# Patient Record
Sex: MALE | Race: BLACK OR AFRICAN AMERICAN | NOT HISPANIC OR LATINO | URBAN - METROPOLITAN AREA
[De-identification: names, ages, dates, MRNs, and addresses within clinical notes are randomized per-mention and may not be internally consistent; named-entity substitution may affect disease eponyms.]

---

## 2022-03-11 ENCOUNTER — INPATIENT (INPATIENT)
Facility: HOSPITAL | Age: 77
LOS: 20 days | Discharge: NURSING, REHAB OR LONG-TERM CARE | DRG: 239 | End: 2022-04-01
Attending: STUDENT IN AN ORGANIZED HEALTH CARE EDUCATION/TRAINING PROGRAM | Admitting: INTERNAL MEDICINE
Payer: OTHER GOVERNMENT

## 2022-03-11 VITALS
WEIGHT: 171.08 LBS | OXYGEN SATURATION: 95 % | RESPIRATION RATE: 25 BRPM | HEIGHT: 60.7 IN | SYSTOLIC BLOOD PRESSURE: 151 MMHG | DIASTOLIC BLOOD PRESSURE: 75 MMHG | TEMPERATURE: 98 F | HEART RATE: 94 BPM

## 2022-03-11 DIAGNOSIS — I21.4 NON-ST ELEVATION (NSTEMI) MYOCARDIAL INFARCTION: ICD-10-CM

## 2022-03-11 LAB
ALBUMIN SERPL ELPH-MCNC: 2.8 G/DL — LOW (ref 3.3–5)
ALP SERPL-CCNC: 112 U/L — SIGNIFICANT CHANGE UP (ref 40–120)
ALT FLD-CCNC: 46 U/L — HIGH (ref 10–45)
ANION GAP SERPL CALC-SCNC: 16 MMOL/L — SIGNIFICANT CHANGE UP (ref 5–17)
APTT BLD: 74 SEC — HIGH (ref 27.5–35.5)
AST SERPL-CCNC: 140 U/L — HIGH (ref 10–40)
BASOPHILS # BLD AUTO: 0.04 K/UL — SIGNIFICANT CHANGE UP (ref 0–0.2)
BASOPHILS NFR BLD AUTO: 0.2 % — SIGNIFICANT CHANGE UP (ref 0–2)
BILIRUB SERPL-MCNC: 0.3 MG/DL — SIGNIFICANT CHANGE UP (ref 0.2–1.2)
BLD GP AB SCN SERPL QL: NEGATIVE — SIGNIFICANT CHANGE UP
BUN SERPL-MCNC: 63 MG/DL — HIGH (ref 7–23)
CALCIUM SERPL-MCNC: 8.5 MG/DL — SIGNIFICANT CHANGE UP (ref 8.4–10.5)
CHLORIDE SERPL-SCNC: 113 MMOL/L — HIGH (ref 96–108)
CO2 SERPL-SCNC: 13 MMOL/L — LOW (ref 22–31)
CREAT SERPL-MCNC: 3.92 MG/DL — HIGH (ref 0.5–1.3)
EGFR: 15 ML/MIN/1.73M2 — LOW
EOSINOPHIL # BLD AUTO: 0.22 K/UL — SIGNIFICANT CHANGE UP (ref 0–0.5)
EOSINOPHIL NFR BLD AUTO: 1.3 % — SIGNIFICANT CHANGE UP (ref 0–6)
GAS PNL BLDA: SIGNIFICANT CHANGE UP
GLUCOSE BLDC GLUCOMTR-MCNC: 136 MG/DL — HIGH (ref 70–99)
GLUCOSE SERPL-MCNC: 111 MG/DL — HIGH (ref 70–99)
HCT VFR BLD CALC: 29.7 % — LOW (ref 39–50)
HGB BLD-MCNC: 10 G/DL — LOW (ref 13–17)
IMM GRANULOCYTES NFR BLD AUTO: 1.3 % — SIGNIFICANT CHANGE UP (ref 0–1.5)
INR BLD: 1.4 RATIO — HIGH (ref 0.88–1.16)
LYMPHOCYTES # BLD AUTO: 0.75 K/UL — LOW (ref 1–3.3)
LYMPHOCYTES # BLD AUTO: 4.6 % — LOW (ref 13–44)
MAGNESIUM SERPL-MCNC: 2.1 MG/DL — SIGNIFICANT CHANGE UP (ref 1.6–2.6)
MCHC RBC-ENTMCNC: 25.6 PG — LOW (ref 27–34)
MCHC RBC-ENTMCNC: 33.7 GM/DL — SIGNIFICANT CHANGE UP (ref 32–36)
MCV RBC AUTO: 76 FL — LOW (ref 80–100)
MONOCYTES # BLD AUTO: 0.95 K/UL — HIGH (ref 0–0.9)
MONOCYTES NFR BLD AUTO: 5.8 % — SIGNIFICANT CHANGE UP (ref 2–14)
NEUTROPHILS # BLD AUTO: 14.2 K/UL — HIGH (ref 1.8–7.4)
NEUTROPHILS NFR BLD AUTO: 86.8 % — HIGH (ref 43–77)
NRBC # BLD: 0 /100 WBCS — SIGNIFICANT CHANGE UP (ref 0–0)
NT-PROBNP SERPL-SCNC: 5764 PG/ML — HIGH (ref 0–300)
PHOSPHATE SERPL-MCNC: 5.8 MG/DL — HIGH (ref 2.5–4.5)
PLATELET # BLD AUTO: 270 K/UL — SIGNIFICANT CHANGE UP (ref 150–400)
POTASSIUM SERPL-MCNC: 4.5 MMOL/L — SIGNIFICANT CHANGE UP (ref 3.5–5.3)
POTASSIUM SERPL-SCNC: 4.5 MMOL/L — SIGNIFICANT CHANGE UP (ref 3.5–5.3)
PROT SERPL-MCNC: 6.6 G/DL — SIGNIFICANT CHANGE UP (ref 6–8.3)
PROTHROM AB SERPL-ACNC: 16.3 SEC — HIGH (ref 10.5–13.4)
RBC # BLD: 3.91 M/UL — LOW (ref 4.2–5.8)
RBC # FLD: 15.6 % — HIGH (ref 10.3–14.5)
RH IG SCN BLD-IMP: POSITIVE — SIGNIFICANT CHANGE UP
SODIUM SERPL-SCNC: 142 MMOL/L — SIGNIFICANT CHANGE UP (ref 135–145)
WBC # BLD: 16.38 K/UL — HIGH (ref 3.8–10.5)
WBC # FLD AUTO: 16.38 K/UL — HIGH (ref 3.8–10.5)

## 2022-03-11 PROCEDURE — 93010 ELECTROCARDIOGRAM REPORT: CPT

## 2022-03-11 PROCEDURE — 71045 X-RAY EXAM CHEST 1 VIEW: CPT | Mod: 26

## 2022-03-11 PROCEDURE — 99291 CRITICAL CARE FIRST HOUR: CPT

## 2022-03-11 PROCEDURE — 99292 CRITICAL CARE ADDL 30 MIN: CPT | Mod: 25

## 2022-03-11 RX ORDER — CHLORHEXIDINE GLUCONATE 213 G/1000ML
1 SOLUTION TOPICAL
Refills: 0 | Status: DISCONTINUED | OUTPATIENT
Start: 2022-03-11 | End: 2022-04-01

## 2022-03-11 RX ORDER — DEXTROSE 50 % IN WATER 50 %
15 SYRINGE (ML) INTRAVENOUS ONCE
Refills: 0 | Status: DISCONTINUED | OUTPATIENT
Start: 2022-03-11 | End: 2022-03-12

## 2022-03-11 RX ORDER — IPRATROPIUM/ALBUTEROL SULFATE 18-103MCG
3 AEROSOL WITH ADAPTER (GRAM) INHALATION EVERY 6 HOURS
Refills: 0 | Status: DISCONTINUED | OUTPATIENT
Start: 2022-03-11 | End: 2022-03-12

## 2022-03-11 RX ORDER — GLUCAGON INJECTION, SOLUTION 0.5 MG/.1ML
1 INJECTION, SOLUTION SUBCUTANEOUS ONCE
Refills: 0 | Status: DISCONTINUED | OUTPATIENT
Start: 2022-03-11 | End: 2022-03-12

## 2022-03-11 RX ORDER — BUDESONIDE AND FORMOTEROL FUMARATE DIHYDRATE 160; 4.5 UG/1; UG/1
2 AEROSOL RESPIRATORY (INHALATION)
Refills: 0 | Status: DISCONTINUED | OUTPATIENT
Start: 2022-03-11 | End: 2022-04-01

## 2022-03-11 RX ORDER — CARVEDILOL PHOSPHATE 80 MG/1
6.25 CAPSULE, EXTENDED RELEASE ORAL EVERY 12 HOURS
Refills: 0 | Status: DISCONTINUED | OUTPATIENT
Start: 2022-03-11 | End: 2022-03-11

## 2022-03-11 RX ORDER — SENNA PLUS 8.6 MG/1
2 TABLET ORAL AT BEDTIME
Refills: 0 | Status: DISCONTINUED | OUTPATIENT
Start: 2022-03-11 | End: 2022-03-16

## 2022-03-11 RX ORDER — DEXTROSE 50 % IN WATER 50 %
25 SYRINGE (ML) INTRAVENOUS ONCE
Refills: 0 | Status: DISCONTINUED | OUTPATIENT
Start: 2022-03-11 | End: 2022-03-12

## 2022-03-11 RX ORDER — CARVEDILOL PHOSPHATE 80 MG/1
6.25 CAPSULE, EXTENDED RELEASE ORAL ONCE
Refills: 0 | Status: COMPLETED | OUTPATIENT
Start: 2022-03-11 | End: 2022-03-11

## 2022-03-11 RX ORDER — ATORVASTATIN CALCIUM 80 MG/1
20 TABLET, FILM COATED ORAL AT BEDTIME
Refills: 0 | Status: DISCONTINUED | OUTPATIENT
Start: 2022-03-11 | End: 2022-03-12

## 2022-03-11 RX ORDER — DOXAZOSIN MESYLATE 4 MG
4 TABLET ORAL AT BEDTIME
Refills: 0 | Status: DISCONTINUED | OUTPATIENT
Start: 2022-03-11 | End: 2022-04-01

## 2022-03-11 RX ORDER — DEXTROSE 50 % IN WATER 50 %
12.5 SYRINGE (ML) INTRAVENOUS ONCE
Refills: 0 | Status: DISCONTINUED | OUTPATIENT
Start: 2022-03-11 | End: 2022-03-12

## 2022-03-11 RX ORDER — SODIUM CHLORIDE 9 MG/ML
1000 INJECTION, SOLUTION INTRAVENOUS
Refills: 0 | Status: DISCONTINUED | OUTPATIENT
Start: 2022-03-11 | End: 2022-03-12

## 2022-03-11 RX ORDER — DIPHENHYDRAMINE HCL 50 MG
25 CAPSULE ORAL ONCE
Refills: 0 | Status: DISCONTINUED | OUTPATIENT
Start: 2022-03-11 | End: 2022-03-11

## 2022-03-11 RX ORDER — POLYETHYLENE GLYCOL 3350 17 G/17G
17 POWDER, FOR SOLUTION ORAL DAILY
Refills: 0 | Status: DISCONTINUED | OUTPATIENT
Start: 2022-03-11 | End: 2022-03-16

## 2022-03-11 RX ORDER — HYDRALAZINE HCL 50 MG
10 TABLET ORAL ONCE
Refills: 0 | Status: COMPLETED | OUTPATIENT
Start: 2022-03-11 | End: 2022-03-11

## 2022-03-11 RX ORDER — HEPARIN SODIUM 5000 [USP'U]/ML
1200 INJECTION INTRAVENOUS; SUBCUTANEOUS
Qty: 25000 | Refills: 0 | Status: DISCONTINUED | OUTPATIENT
Start: 2022-03-11 | End: 2022-03-13

## 2022-03-11 RX ORDER — PANTOPRAZOLE SODIUM 20 MG/1
40 TABLET, DELAYED RELEASE ORAL
Refills: 0 | Status: DISCONTINUED | OUTPATIENT
Start: 2022-03-11 | End: 2022-03-29

## 2022-03-11 RX ORDER — CARVEDILOL PHOSPHATE 80 MG/1
12.5 CAPSULE, EXTENDED RELEASE ORAL EVERY 12 HOURS
Refills: 0 | Status: DISCONTINUED | OUTPATIENT
Start: 2022-03-11 | End: 2022-04-01

## 2022-03-11 RX ORDER — CLOPIDOGREL BISULFATE 75 MG/1
75 TABLET, FILM COATED ORAL DAILY
Refills: 0 | Status: DISCONTINUED | OUTPATIENT
Start: 2022-03-11 | End: 2022-04-01

## 2022-03-11 RX ORDER — CEFTRIAXONE 500 MG/1
1000 INJECTION, POWDER, FOR SOLUTION INTRAMUSCULAR; INTRAVENOUS EVERY 24 HOURS
Refills: 0 | Status: DISCONTINUED | OUTPATIENT
Start: 2022-03-11 | End: 2022-03-11

## 2022-03-11 RX ORDER — INSULIN LISPRO 100/ML
VIAL (ML) SUBCUTANEOUS
Refills: 0 | Status: DISCONTINUED | OUTPATIENT
Start: 2022-03-11 | End: 2022-04-01

## 2022-03-11 RX ADMIN — CHLORHEXIDINE GLUCONATE 1 APPLICATION(S): 213 SOLUTION TOPICAL at 19:58

## 2022-03-11 RX ADMIN — Medication 4 MILLIGRAM(S): at 21:40

## 2022-03-11 RX ADMIN — CARVEDILOL PHOSPHATE 6.25 MILLIGRAM(S): 80 CAPSULE, EXTENDED RELEASE ORAL at 19:57

## 2022-03-11 RX ADMIN — SENNA PLUS 2 TABLET(S): 8.6 TABLET ORAL at 21:40

## 2022-03-11 RX ADMIN — Medication 10 MILLIGRAM(S): at 22:50

## 2022-03-11 RX ADMIN — ATORVASTATIN CALCIUM 20 MILLIGRAM(S): 80 TABLET, FILM COATED ORAL at 21:40

## 2022-03-11 RX ADMIN — CARVEDILOL PHOSPHATE 6.25 MILLIGRAM(S): 80 CAPSULE, EXTENDED RELEASE ORAL at 18:21

## 2022-03-11 RX ADMIN — BUDESONIDE AND FORMOTEROL FUMARATE DIHYDRATE 2 PUFF(S): 160; 4.5 AEROSOL RESPIRATORY (INHALATION) at 21:05

## 2022-03-11 NOTE — H&P ADULT - NSICDXPASTMEDICALHX_GEN_ALL_CORE_FT
PAST MEDICAL HISTORY:  HTN (hypertension)     Hyperlipidemia     PVD (peripheral vascular disease)     S/P BKA (below knee amputation), right

## 2022-03-11 NOTE — PATIENT PROFILE ADULT - FALL HARM RISK - HARM RISK INTERVENTIONS

## 2022-03-11 NOTE — H&P ADULT - NSHPREVIEWOFSYSTEMS_GEN_ALL_CORE
Review of Systems Review of Systems  General:	  Skin/Breast:	  Ophthalmologic:	  ENMT:	  Respiratory and Thorax:	complains of cough w phlegm  Cardiovascular:	  Gastrointestinal:	  Genitourinary:	  Musculoskeletal:	complains of pain on palpation of LLE  Neurological:	  Psychiatric:	  Hematology/Lymphatics:	  Endocrine:	  Allergic/Immunologic:

## 2022-03-11 NOTE — H&P ADULT - NSHPPHYSICALEXAM_GEN_ALL_CORE
PHYSICAL EXAM:  Constitutional: WAM in NAD  Eyes: glasses  Respiratory: Coarse breath sounds B/L w crackles on RL bases  Cardiovascular: S1, S2 no edema. NO JVD  Gastrointestinal: distended soft, NT  Genitourinary: urena in place  Extremities: R AKA, LLE cool, NO DP/PT or popliteal on doppler  Neurological: A and O x 4  Psychiatric: normal affect

## 2022-03-11 NOTE — H&P ADULT - ATTENDING COMMENTS
Patient seen and examined. Agree with assessment and plan as outlined above.  75 yo M w/ pmh of HTN, HLD, PVD, R AKA who was brought by EMS to [facility] after a mechanical fall from his wheelchair found to have CHF exacerbation as well as acute LLE arterial occlusions w/ unsuccessful angiogram. Patient was evaluated for LLE BKA and had abnormal nuclear stress test. Patient transferred for cardiac catheterization and evaluation prior to planned BKA. Continue heparin infusion, Vascular surgery re-evaluation. Blood pressure control. Check repeat chest x-ray and plan for gentle IV diuresis in addition to nebulizers for copd.

## 2022-03-11 NOTE — H&P ADULT - HISTORY OF PRESENT ILLNESS
75 yo M w/ pmh of HTN, HLD, PVD, R AKA who was brought by EMS to [facility]  after a mechanical fall from his wheelchair at home. NO LOC or head injury and he denies dizziness syncope. [facility] [place] Per his daughter he has been more short of breath when talking. He was admitted to [facility] for sepsis due to PNA w/ troponemia 2/2 to demand on 3/6/2022. He was also found to be in CHF as well as a LE arterial embolism    75 yo M w/ pmh of HTN, HLD, PVD, R AKA who was brought by EMS to [facility]  after a mechanical fall from his wheelchair at home. NO LOC or head injury and he denies dizziness syncope. [facility] [place] Per his daughter he has been more short of breath when talking. He was admitted to [facility] for sepsis due to PNA w/ troponemia 2/2 to demand on 3/6/2022. He was also found to be in CHF as well as a LE arterial embolism. Pt received Azithromycin and Ceftriaxone for a B/L PNA seen on XR. Hypoxic resp failure improved by day 2 of ICU stay. On 3/8 pt begin to complain of severe LLE pain and found to have LLE CFA and prox SFA occlusions.     75 yo M w/ pmh of HTN, HLD, PVD, R AKA who was brought by EMS to [facility]  after a mechanical fall from his wheelchair at home. NO LOC or head injury and he denies dizziness syncope. He was admitted to [facility] for sepsis due to PNA w/ troponemia 2/2 to demand on 3/6/2022. He was also found to be in CHF as well as a LE arterial embolism. Pt received Azithromycin and Ceftriaxone for a B/L PNA seen on CT Chest. Hypoxic resp failure improved by day 2 of ICU stay. On 3/8 pt begin to complain of severe LLE pain and found to have LLE CFA and prox SFA occlusions.  Pt was started on a heparin gtt and an arterial angiogram was attempted by Vascular surgery however it was unsuccessful. During the aortogram he became hypertensive with RAFIQ and decision was made to abort the case. BKA was recommended. Pt was noted to have elevated CKs and managed for rhabdo. Pre-op stress testing for BKA revealed multiple ischemic regions. Pt was transferred to [facility] for cardiac cath.    77 yo M w/ pmh of HTN, HLD, PVD, R AKA who was brought by EMS to [facility]  after a mechanical fall from his wheelchair at home. NO LOC or head injury and he denies dizziness syncope. He was admitted to [facility] for sepsis due to PNA w/ troponemia 2/2 to demand on 3/6/2022. He was also found to be in CHF as well as a LE arterial embolism. Pt received Azithromycin and Ceftriaxone for a B/L PNA seen on CT Chest. Hypoxic resp failure improved by day 2 of ICU stay. On 3/8 pt begin to complain of severe LLE pain and found to have LLE CFA and prox SFA occlusions.  Pt was started on a heparin gtt and an arterial angiogram was attempted by Vascular surgery however it was unsuccessful. During the aortogram he became hypertensive with RAFIQ and decision was made to abort the case. BKA was recommended. Pt was noted to have elevated CKs and managed for rhabdo. Pre-op stress testing for BKA revealed multiple ischemic regions. Pt was transferred to [facility] for cardiac cath.     ECHO revealed EF 35-40% , grade 2 DD.

## 2022-03-11 NOTE — H&P ADULT - ASSESSMENT
77 yo M w/ pmh of HTN, HLD, PVD, R AKA who was brought by EMS to [facility]  after a mechanical fall from his wheelchair found to have CHF exacerbation as well as acute LLE arterial occlusions w/ unsuccessful angiogram and e/o coronary artery disease on stress test now tx for PCI and cardiac clearance for possible LLE BKA    Neuro  - A and O x 4  - Dilaudid and oxy for pain . Denies pain at this time only on palpation    Resp  - On 2 L NC, + cough  - Completed course of PNA  - Check Chest XR and POCUS for B lines    CV  + stress test  - Plan for [facility] likely sunday for clearance for L BKA  - Check cardiac enzymes   - He presented w elevated cardiac enzymes  75 yo M w/ pmh of HTN, HLD, PVD, R AKA who was brought by EMS to [facility]  after a mechanical fall from his wheelchair found to have CHF exacerbation as well as acute LLE arterial occlusions w/ unsuccessful angiogram and e/o coronary artery disease on stress test now tx for PCI and cardiac clearance for possible LLE BKA    Neuro  - A and O x 4  - Dilaudid and oxy for pain . Denies pain at this time only on palpation    Resp  - On 2 L NC, + cough  - Completed course of PNA  - Check Chest XR and POCUS for B lines    CV  + stress test  - Plan for [facility] likely sunday for clearance for L BKA  - Check cardiac enzymes   - He presented w elevated cardiac enzymes     AdHF  - EF 45%, will check Chest XR and POCUS but likely needs further diuresis  - Keep net neg  - Continue coreg for HTN    GI  - DASH CC diet  - PPI      - Olivarez in place check UA    ID  - Was tx for PNA check WBC and temps  - Monitor off antibiotics    Renal  - Check cr and electrolytes    Heme  - Continue heparin gtt for CFA and SFA occlusions    Endo  - ISS    Vasc  L CFA and SFA occlusions w unsuccessful angio. Recommending L BKA possobly AKA  Pt amenable to surgery  - Continue heparin gtt, vasc surgery made aware.     Sammie Stone

## 2022-03-11 NOTE — PROGRESS NOTE ADULT - SUBJECTIVE AND OBJECTIVE BOX
THI HASTINGS  MRN-[id number]  Patient is a 76y old  Male who presents with a chief complaint of Cardiac clearnace for ischemic limb (11 Mar 2022 16:57)    HPI:  75 yo M w/ pmh of HTN, HLD, PVD, R AKA who was brought by EMS to [facility]  after a mechanical fall from his wheelchair at home. NO LOC or head injury and he denies dizziness syncope. He was admitted to [facility] for sepsis due to PNA w/ troponemia 2/2 to demand on 3/6/2022. He was also found to be in CHF as well as a LE arterial embolism. Pt received Azithromycin and Ceftriaxone for a B/L PNA seen on CT Chest. Hypoxic resp failure improved by day 2 of ICU stay. On 3/8 pt begin to complain of severe LLE pain and found to have LLE CFA and prox SFA occlusions.  Pt was started on a heparin gtt and an arterial angiogram was attempted by Vascular surgery however it was unsuccessful. During the aortogram he became hypertensive with RAFIQ and decision was made to abort the case. BKA was recommended. Pt was noted to have elevated CKs and managed for rhabdo. Pre-op stress testing for BKA revealed multiple ischemic regions. Pt was transferred to [facility] for cardiac cath.     ECHO revealed EF 35-40% , grade 2 DD.  (11 Mar 2022 16:57)      Hospital Course:    24 HOUR EVENTS:    REVIEW OF SYSTEMS:    CONSTITUTIONAL: No weakness, fevers or chills  EYES/ENT: No visual changes;  No vertigo or throat pain   NECK: No pain or stiffness  RESPIRATORY: No cough, wheezing, hemoptysis; No shortness of breath  CARDIOVASCULAR: No chest pain or palpitations  GASTROINTESTINAL: No abdominal or epigastric pain. No nausea, vomiting, or hematemesis; No diarrhea or constipation. No melena or hematochezia.  GENITOURINARY: No dysuria, frequency or hematuria  NEUROLOGICAL: No numbness or weakness  SKIN: No itching, rashes      ICU Vital Signs Last 24 Hrs  T(C): 36.9 (11 Mar 2022 19:00), Max: 36.9 (11 Mar 2022 19:00)  T(F): 98.5 (11 Mar 2022 19:00), Max: 98.5 (11 Mar 2022 19:00)  HR: 87 (11 Mar 2022 20:00) (87 - 96)  BP: 147/70 (11 Mar 2022 20:00) (143/68 - 162/75)  BP(mean): 100 (11 Mar 2022 20:00) (95 - 108)  ABP: 187/62 (11 Mar 2022 20:00) (187/62 - 206/71)  ABP(mean): 93 (11 Mar 2022 20:00) (93 - 103)  RR: 29 (11 Mar 2022 20:00) (21 - 34)  SpO2: 97% (11 Mar 2022 20:00) (95% - 100%)      CVP(mm Hg): --  CO: --  CI: --  PA: --  PA(mean): --  PA(direct): --  PCWP: --  LA: --  RA: --  SVR: --  SVRI: --  PVR: --  PVRI: --  I&O's Summary    11 Mar 2022 07:01  -  11 Mar 2022 20:27  --------------------------------------------------------  IN: 276 mL / OUT: 350 mL / NET: -74 mL        CAPILLARY BLOOD GLUCOSE    CAPILLARY BLOOD GLUCOSE      POCT Blood Glucose.: 136 mg/dL (11 Mar 2022 18:19)      PHYSICAL EXAM:  GENERAL: No acute distress, well-developed  HEAD:  Atraumatic, Normocephalic  EYES: EOMI, PERRLA, conjunctiva and sclera clear  NECK: Supple, no lymphadenopathy, no JVD  CHEST/LUNG: CTAB; No wheezes, rales, or rhonchi  HEART: Regular rate and rhythm. Normal S1/S2. No murmurs, rubs, or gallops  ABDOMEN: Soft, non-tender, non-distended; normal bowel sounds, no organomegaly  EXTREMITIES:  2+ peripheral pulses b/l, No clubbing, cyanosis, or edema  NEUROLOGY: A&O x 3, no focal deficits  SKIN: No rashes or lesions    ============================I/O===========================   I&O's Detail    11 Mar 2022 07:01  -  11 Mar 2022 20:27  --------------------------------------------------------  IN:    Heparin: 36 mL    Oral Fluid: 240 mL  Total IN: 276 mL    OUT:    Indwelling Catheter - Urethral (mL): 350 mL  Total OUT: 350 mL    Total NET: -74 mL        ============================ LABS =========================                        10.0   16.38 )-----------( 270      ( 11 Mar 2022 17:53 )             29.7     03-11    142  |  113<H>  |  63<H>  ----------------------------<  111<H>  4.5   |  13<L>  |  3.92<H>    Ca    8.5      11 Mar 2022 17:53  Phos  5.8     03-11  Mg     2.1     03-11    TPro  6.6  /  Alb  2.8<L>  /  TBili  0.3  /  DBili  x   /  AST  140<H>  /  ALT  46<H>  /  AlkPhos  112  03-11                LIVER FUNCTIONS - ( 11 Mar 2022 17:53 )  Alb: 2.8 g/dL / Pro: 6.6 g/dL / ALK PHOS: 112 U/L / ALT: 46 U/L / AST: 140 U/L / GGT: x           PT/INR - ( 11 Mar 2022 17:53 )   PT: 16.3 sec;   INR: 1.40 ratio         PTT - ( 11 Mar 2022 17:53 )  PTT:74.0 sec  ABG - ( 11 Mar 2022 17:47 )  pH, Arterial: 7.32  pH, Blood: x     /  pCO2: 26    /  pO2: 90    / HCO3: 13    / Base Excess: -11.3 /  SaO2: 97.9              Blood Gas Arterial, Lactate: 1.0 mmol/L (03-11-22 @ 17:47)      ======================Micro/Rad/Cardio=================  Telemtry: Reviewed   EKG: Reviewed  CXR: Reviewed  Culture: Reviewed   Echo:   Cath:   ======================================================  PAST MEDICAL & SURGICAL HISTORY:  HTN (hypertension)    Hyperlipidemia    PVD (peripheral vascular disease)    S/P BKA (below knee amputation), right      ====================ASSESSMENT ==============                Plan:  ====================== NEUROLOGY=====================  - A and O x 4  - Dilaudid and oxy for pain . Denies pain at this time only on palpation    ==================== RESPIRATORY======================  - On 2 L NC, + cough  - Completed course of PNA  - Check Chest XR and POCUS for B lines    ====================CARDIOVASCULAR==================  + stress test  - Plan for [facility] likely sunday for clearance for L BKA  - Check cardiac enzymes   - He presented w elevated cardiac enzymes     AdHF  - EF 45%, will check Chest XR and POCUS but likely needs further diuresis  - Keep net neg  - Continue coreg for HTN    L CFA and SFA occlusions w unsuccessful angio. Recommending L BKA possobly AKA  Pt amenable to surgery  - Continue heparin gtt, vasc surgery made aware.     carvedilol 12.5 milliGRAM(s) Oral every 12 hours  doxazosin 4 milliGRAM(s) Oral at bedtime    ===================HEMATOLOGIC/ONC ===================  - Continue heparin gtt for CFA and SFA occlusions    heparin  Infusion 1200 Unit(s)/Hr (12 mL/Hr) IV Continuous <Continuous>    ===================== RENAL =========================  Continue monitoring urine output    ==================== GASTROINTESTINAL===================  - DASH CC diet  - Bowel regimen with Miralax and Senna     GI prophylaxis, pantoprazole    Tablet 40 milliGRAM(s) Oral before breakfast  polyethylene glycol 3350 17 Gram(s) Oral daily  senna 2 Tablet(s) Oral at bedtime    =======================    ENDOCRINE  =====================  Stress hyperglycemia, continue glucose control with admelog sliding scale     insulin lispro (ADMELOG) corrective regimen sliding scale   SubCutaneous three times a day before meals    ========================INFECTIOUS DISEASE================  - Was tx for PNA check WBC and temps  - Monitor off antibiotics    Patient requires continuous monitoring with bedside rhythm monitoring, pulse ox monitoring, and intermittent blood gas analysis. Care plan discussed with ICU care team. Patient remained critical and at risk for life threatening decompensation.  Patient seen, examined and plan discussed with CCU team during rounds.     I have personally provided ____ minutes of critical care time excluding time spent on separate procedures, in addition to initial critical care time provided by the CICU Attending, Dr. Dunbar.     By signing my name below, I, Karma Lopez, attest that this documentation has been prepared under the direction and in the presence of Aileen Lambert NP.  Electronically signed: Brinda Campos, 03-11-22 @ 20:27    I, Aileen Lambert, personally performed the services described in this documentation. all medical record entries made by the brinda were at my direction and in my presence. I have reviewed the chart and agree that the record reflects my personal performance and is accurate and complete  Electronically signed: Aileen Lambert NP.       THI HASTINGS  MRN-[id number]  Patient is a 76y old  Male who presents with a chief complaint of Cardiac clearnace for ischemic limb (11 Mar 2022 16:57)    HPI:  77 yo M w/ pmh of HTN, HLD, PVD, R AKA who was brought by EMS to [facility]  after a mechanical fall from his wheelchair at home. NO LOC or head injury and he denies dizziness syncope. He was admitted to [facility] for sepsis due to PNA w/ troponemia 2/2 to demand on 3/6/2022. He was also found to be in CHF as well as a LE arterial embolism. Pt received Azithromycin and Ceftriaxone for a B/L PNA seen on CT Chest. Hypoxic resp failure improved by day 2 of ICU stay. On 3/8 pt begin to complain of severe LLE pain and found to have LLE CFA and prox SFA occlusions.  Pt was started on a heparin gtt and an arterial angiogram was attempted by Vascular surgery however it was unsuccessful. During the aortogram he became hypertensive with RAFIQ and decision was made to abort the case. BKA was recommended. Pt was noted to have elevated CKs and managed for rhabdo. Pre-op stress testing for BKA revealed multiple ischemic regions. Pt was transferred to [facility] for cardiac cath.     ECHO revealed EF 35-40% , grade 2 DD.  (11 Mar 2022 16:57)      Hospital Course:    24 HOUR EVENTS:    REVIEW OF SYSTEMS:    CONSTITUTIONAL: No weakness, fevers or chills  EYES/ENT: No visual changes;  No vertigo or throat pain   NECK: No pain or stiffness  RESPIRATORY: No cough, wheezing, hemoptysis; No shortness of breath  CARDIOVASCULAR: No chest pain or palpitations  GASTROINTESTINAL: No abdominal or epigastric pain. No nausea, vomiting, or hematemesis; No diarrhea or constipation. No melena or hematochezia.  GENITOURINARY: No dysuria, frequency or hematuria  NEUROLOGICAL: No numbness or weakness  SKIN: No itching, rashes      ICU Vital Signs Last 24 Hrs  T(C): 36.9 (11 Mar 2022 19:00), Max: 36.9 (11 Mar 2022 19:00)  T(F): 98.5 (11 Mar 2022 19:00), Max: 98.5 (11 Mar 2022 19:00)  HR: 87 (11 Mar 2022 20:00) (87 - 96)  BP: 147/70 (11 Mar 2022 20:00) (143/68 - 162/75)  BP(mean): 100 (11 Mar 2022 20:00) (95 - 108)  ABP: 187/62 (11 Mar 2022 20:00) (187/62 - 206/71)  ABP(mean): 93 (11 Mar 2022 20:00) (93 - 103)  RR: 29 (11 Mar 2022 20:00) (21 - 34)  SpO2: 97% (11 Mar 2022 20:00) (95% - 100%)      CVP(mm Hg): --  CO: --  CI: --  PA: --  PA(mean): --  PA(direct): --  PCWP: --  LA: --  RA: --  SVR: --  SVRI: --  PVR: --  PVRI: --  I&O's Summary    11 Mar 2022 07:01  -  11 Mar 2022 20:27  --------------------------------------------------------  IN: 276 mL / OUT: 350 mL / NET: -74 mL        CAPILLARY BLOOD GLUCOSE    CAPILLARY BLOOD GLUCOSE      POCT Blood Glucose.: 136 mg/dL (11 Mar 2022 18:19)      PHYSICAL EXAM:  GENERAL: No acute distress, well-developed  HEAD:  Atraumatic, Normocephalic  EYES: EOMI, PERRLA, conjunctiva and sclera clear  NECK: Supple, no lymphadenopathy, no JVD  CHEST/LUNG: CTAB; No wheezes, rales, or rhonchi  HEART: Regular rate and rhythm. Normal S1/S2. No murmurs, rubs, or gallops  ABDOMEN: Soft, non-tender, non-distended; normal bowel sounds, no organomegaly  EXTREMITIES:  2+ peripheral pulses b/l, No clubbing, cyanosis, or edema  NEUROLOGY: A&O x 3, no focal deficits  SKIN: No rashes or lesions    ============================I/O===========================   I&O's Detail    11 Mar 2022 07:01  -  11 Mar 2022 20:27  --------------------------------------------------------  IN:    Heparin: 36 mL    Oral Fluid: 240 mL  Total IN: 276 mL    OUT:    Indwelling Catheter - Urethral (mL): 350 mL  Total OUT: 350 mL    Total NET: -74 mL        ============================ LABS =========================                        10.0   16.38 )-----------( 270      ( 11 Mar 2022 17:53 )             29.7     03-11    142  |  113<H>  |  63<H>  ----------------------------<  111<H>  4.5   |  13<L>  |  3.92<H>    Ca    8.5      11 Mar 2022 17:53  Phos  5.8     03-11  Mg     2.1     03-11    TPro  6.6  /  Alb  2.8<L>  /  TBili  0.3  /  DBili  x   /  AST  140<H>  /  ALT  46<H>  /  AlkPhos  112  03-11                LIVER FUNCTIONS - ( 11 Mar 2022 17:53 )  Alb: 2.8 g/dL / Pro: 6.6 g/dL / ALK PHOS: 112 U/L / ALT: 46 U/L / AST: 140 U/L / GGT: x           PT/INR - ( 11 Mar 2022 17:53 )   PT: 16.3 sec;   INR: 1.40 ratio         PTT - ( 11 Mar 2022 17:53 )  PTT:74.0 sec  ABG - ( 11 Mar 2022 17:47 )  pH, Arterial: 7.32  pH, Blood: x     /  pCO2: 26    /  pO2: 90    / HCO3: 13    / Base Excess: -11.3 /  SaO2: 97.9              Blood Gas Arterial, Lactate: 1.0 mmol/L (03-11-22 @ 17:47)      ======================Micro/Rad/Cardio=================  Telemtry: Reviewed   EKG: Reviewed  CXR: Reviewed  Culture: Reviewed   Echo:   Cath:   ======================================================  PAST MEDICAL & SURGICAL HISTORY:  HTN (hypertension)    Hyperlipidemia    PVD (peripheral vascular disease)    S/P BKA (below knee amputation), right      ====================ASSESSMENT ==============                Plan:  ====================== NEUROLOGY=====================  - A and O x 4  - Dilaudid and oxy for pain . Denies pain at this time only on palpation    ==================== RESPIRATORY======================  - On 2 L NC, + cough  - Completed course of PNA  - Check Chest XR and POCUS for B lines    ====================CARDIOVASCULAR==================  + stress test  - Plan for [facility] likely sunday for clearance for L BKA  - Check cardiac enzymes   - He presented w elevated cardiac enzymes     AdHF  - EF 45%, will check Chest XR and POCUS but likely needs further diuresis  - Keep net neg  - Continue coreg for HTN    L CFA and SFA occlusions w unsuccessful angio. Recommending L BKA possobly AKA  Pt amenable to surgery  - Continue heparin gtt, vasc surgery made aware.     carvedilol 12.5 milliGRAM(s) Oral every 12 hours  doxazosin 4 milliGRAM(s) Oral at bedtime    ===================HEMATOLOGIC/ONC ===================  - Continue heparin gtt for CFA and SFA occlusions    heparin  Infusion 1200 Unit(s)/Hr (12 mL/Hr) IV Continuous <Continuous>    ===================== RENAL =========================  Continue monitoring urine output    ==================== GASTROINTESTINAL===================  - DASH CC diet  - Bowel regimen with Miralax and Senna     GI prophylaxis, pantoprazole    Tablet 40 milliGRAM(s) Oral before breakfast  polyethylene glycol 3350 17 Gram(s) Oral daily  senna 2 Tablet(s) Oral at bedtime    =======================    ENDOCRINE  =====================  Stress hyperglycemia, continue glucose control with admelog sliding scale     insulin lispro (ADMELOG) corrective regimen sliding scale   SubCutaneous three times a day before meals    ========================INFECTIOUS DISEASE================  - Was tx for PNA check WBC and temps  - Monitor off antibiotics    Patient requires continuous monitoring with bedside rhythm monitoring, pulse ox monitoring, and intermittent blood gas analysis. Care plan discussed with ICU care team. Patient remained critical and at risk for life threatening decompensation.  Patient seen, examined and plan discussed with CCU team during rounds.     I have personally provided ____ minutes of critical care time excluding time spent on separate procedures, in addition to initial critical care time provided by the CICU Attending, Dr. Dunbar.     By signing my name below, I, Karma Lopez, attest that this documentation has been prepared under the direction and in the presence of Aileen Lambert NP.  Electronically signed: Brinda Campos, 03-11-22 @ 20:27    I, Aileen Lambert, personally performed the services described in this documentation. all medical record entries made by the brinda were at my direction and in my presence. I have reviewed the chart and agree that the record reflects my personal performance and is accurate and complete  Electronically signed: Aileen Lambert NP.    Jose Baker Fellow Attestation   Transferred from [facility] for cardiac clearnace given positive stress test and intermitent chest pain. Plan for LHC prior to his LLE limb surgery for severe PAD. Continue heparin gtt    THI HASTINGS  MRN-[id number]  Patient is a 76y old  Male who presents with a chief complaint of Cardiac clearnace for ischemic limb (11 Mar 2022 16:57)    HPI:  75 yo M w/ pmh of HTN, HLD, PVD, R AKA who was brought by EMS to [facility]  after a mechanical fall from his wheelchair at home. NO LOC or head injury and he denies dizziness syncope. He was admitted to [facility] for sepsis due to PNA w/ troponemia 2/2 to demand on 3/6/2022. He was also found to be in CHF as well as a LE arterial embolism. Pt received Azithromycin and Ceftriaxone for a B/L PNA seen on CT Chest. Hypoxic resp failure improved by day 2 of ICU stay. On 3/8 pt begin to complain of severe LLE pain and found to have LLE CFA and prox SFA occlusions.  Pt was started on a heparin gtt and an arterial angiogram was attempted by Vascular surgery however it was unsuccessful. During the aortogram he became hypertensive with RAFIQ and decision was made to abort the case. BKA was recommended. Pt was noted to have elevated CKs and managed for rhabdo. Pre-op stress testing for BKA revealed multiple ischemic regions. Pt was transferred to [facility] for cardiac cath.     ECHO revealed EF 35-40% , grade 2 DD.  (11 Mar 2022 16:57)      Hospital Course:    24 HOUR EVENTS:  Hypertensive overnight, coreg dose increase and hydralazine 10mg IVP given with appropriate response    REVIEW OF SYSTEMS:    CONSTITUTIONAL: No weakness, fevers or chills  EYES/ENT: No visual changes;  No vertigo or throat pain   NECK: No pain or stiffness  RESPIRATORY: No cough, wheezing, hemoptysis; No shortness of breath  CARDIOVASCULAR: No chest pain or palpitations  GASTROINTESTINAL: No abdominal or epigastric pain. No nausea, vomiting, or hematemesis; No diarrhea or constipation. No melena or hematochezia.  GENITOURINARY: No dysuria, frequency or hematuria  NEUROLOGICAL: No numbness or weakness  SKIN: No itching, rashes      ICU Vital Signs Last 24 Hrs  T(C): 36.9 (11 Mar 2022 19:00), Max: 36.9 (11 Mar 2022 19:00)  T(F): 98.5 (11 Mar 2022 19:00), Max: 98.5 (11 Mar 2022 19:00)  HR: 87 (11 Mar 2022 20:00) (87 - 96)  BP: 147/70 (11 Mar 2022 20:00) (143/68 - 162/75)  BP(mean): 100 (11 Mar 2022 20:00) (95 - 108)  ABP: 187/62 (11 Mar 2022 20:00) (187/62 - 206/71)  ABP(mean): 93 (11 Mar 2022 20:00) (93 - 103)  RR: 29 (11 Mar 2022 20:00) (21 - 34)  SpO2: 97% (11 Mar 2022 20:00) (95% - 100%)      CVP(mm Hg): --  CO: --  CI: --  PA: --  PA(mean): --  PA(direct): --  PCWP: --  LA: --  RA: --  SVR: --  SVRI: --  PVR: --  PVRI: --  I&O's Summary    11 Mar 2022 07:01  -  11 Mar 2022 20:27  --------------------------------------------------------  IN: 276 mL / OUT: 350 mL / NET: -74 mL        CAPILLARY BLOOD GLUCOSE    CAPILLARY BLOOD GLUCOSE      POCT Blood Glucose.: 136 mg/dL (11 Mar 2022 18:19)      PHYSICAL EXAM:  GENERAL: No acute distress, well-developed  HEAD:  Atraumatic, Normocephalic  EYES: EOMI, PERRLA, conjunctiva and sclera clear  NECK: Supple, no lymphadenopathy, no JVD  CHEST/LUNG: CTAB; No wheezes, rales, or rhonchi  HEART: Regular rate and rhythm. Normal S1/S2. No murmurs, rubs, or gallops  ABDOMEN: Soft, non-tender, non-distended; normal bowel sounds, no organomegaly  EXTREMITIES:  no pulses to left lower extremities. 2+ peripheral pulses, No clubbing, cyanosis, or edema  NEUROLOGY: A&O x 3, no focal deficits  SKIN: No rashes or lesions    ============================I/O===========================   I&O's Detail    11 Mar 2022 07:01  -  11 Mar 2022 20:27  --------------------------------------------------------  IN:    Heparin: 36 mL    Oral Fluid: 240 mL  Total IN: 276 mL    OUT:    Indwelling Catheter - Urethral (mL): 350 mL  Total OUT: 350 mL    Total NET: -74 mL        ============================ LABS =========================                        10.0   16.38 )-----------( 270      ( 11 Mar 2022 17:53 )             29.7     03-11    142  |  113<H>  |  63<H>  ----------------------------<  111<H>  4.5   |  13<L>  |  3.92<H>    Ca    8.5      11 Mar 2022 17:53  Phos  5.8     03-11  Mg     2.1     03-11    TPro  6.6  /  Alb  2.8<L>  /  TBili  0.3  /  DBili  x   /  AST  140<H>  /  ALT  46<H>  /  AlkPhos  112  03-11                LIVER FUNCTIONS - ( 11 Mar 2022 17:53 )  Alb: 2.8 g/dL / Pro: 6.6 g/dL / ALK PHOS: 112 U/L / ALT: 46 U/L / AST: 140 U/L / GGT: x           PT/INR - ( 11 Mar 2022 17:53 )   PT: 16.3 sec;   INR: 1.40 ratio         PTT - ( 11 Mar 2022 17:53 )  PTT:74.0 sec  ABG - ( 11 Mar 2022 17:47 )  pH, Arterial: 7.32  pH, Blood: x     /  pCO2: 26    /  pO2: 90    / HCO3: 13    / Base Excess: -11.3 /  SaO2: 97.9              Blood Gas Arterial, Lactate: 1.0 mmol/L (03-11-22 @ 17:47)      ======================Micro/Rad/Cardio=================  Telemtry: Reviewed   EKG: Reviewed  CXR: Reviewed  Culture: Reviewed   Echo:   Cath:   ======================================================  PAST MEDICAL & SURGICAL HISTORY:  HTN (hypertension)    Hyperlipidemia    PVD (peripheral vascular disease)    S/P BKA (below knee amputation), right      ====================ASSESSMENT ==============                Plan:  ====================== NEUROLOGY=====================  - A and O x 4  - Dilaudid and oxy for pain . Denies pain at this time only on palpation    ==================== RESPIRATORY======================  - On 2 L NC, + cough  - Completed course of PNA  - Check Chest XR and POCUS for B lines    ====================CARDIOVASCULAR==================  + stress test  - Plan for [facility] likely sunday for clearance for L BKA  - Check cardiac enzymes   - He presented w elevated cardiac enzymes     AdHF  - EF 45%, will check Chest XR and POCUS but likely needs further diuresis  - Keep net neg  - Continue coreg for HTN    L CFA and SFA occlusions w unsuccessful angio. Recommending L BKA possobly AKA  Pt amenable to surgery  - Continue heparin gtt, vasc surgery made aware.     carvedilol 12.5 milliGRAM(s) Oral every 12 hours  doxazosin 4 milliGRAM(s) Oral at bedtime    ===================HEMATOLOGIC/ONC ===================  - Continue heparin gtt for CFA and SFA occlusions    heparin  Infusion 1200 Unit(s)/Hr (12 mL/Hr) IV Continuous <Continuous>    ===================== RENAL =========================  Continue monitoring urine output    ==================== GASTROINTESTINAL===================  - DASH CC diet  - Bowel regimen with Miralax and Senna     GI prophylaxis, pantoprazole    Tablet 40 milliGRAM(s) Oral before breakfast  polyethylene glycol 3350 17 Gram(s) Oral daily  senna 2 Tablet(s) Oral at bedtime    =======================    ENDOCRINE  =====================  Stress hyperglycemia, continue glucose control with admelog sliding scale     insulin lispro (ADMELOG) corrective regimen sliding scale   SubCutaneous three times a day before meals    ========================INFECTIOUS DISEASE================  - Was tx for PNA check WBC and temps  - Monitor off antibiotics    Patient requires continuous monitoring with bedside rhythm monitoring, pulse ox monitoring, and intermittent blood gas analysis. Care plan discussed with ICU care team. Patient remained critical and at risk for life threatening decompensation.  Patient seen, examined and plan discussed with CCU team during rounds.     I have personally provided __30__ minutes of critical care time excluding time spent on separate procedures, in addition to initial critical care time provided by the CICU Attending, Dr. Dunbar.     By signing my name below, I, Karma Lopez, attest that this documentation has been prepared under the direction and in the presence of Aileen Lambert NP.  Electronically signed: Anthony Campos, 03-11-22 @ 20:27    I, Aileen Lambert, personally performed the services described in this documentation. all medical record entries made by the scribe were at my direction and in my presence. I have reviewed the chart and agree that the record reflects my personal performance and is accurate and complete  Electronically signed: Aileen Lambert NP.    Jose Baker Fellow Attestation   Transferred from [facility] for cardiac clearnace given positive stress test and intermitent chest pain. Plan for [facility] prior to his LLE limb surgery for severe PAD. Continue heparin gtt

## 2022-03-12 DIAGNOSIS — I77.9 DISORDER OF ARTERIES AND ARTERIOLES, UNSPECIFIED: ICD-10-CM

## 2022-03-12 DIAGNOSIS — J18.9 PNEUMONIA, UNSPECIFIED ORGANISM: ICD-10-CM

## 2022-03-12 DIAGNOSIS — N17.9 ACUTE KIDNEY FAILURE, UNSPECIFIED: ICD-10-CM

## 2022-03-12 DIAGNOSIS — I10 ESSENTIAL (PRIMARY) HYPERTENSION: ICD-10-CM

## 2022-03-12 DIAGNOSIS — R94.39 ABNORMAL RESULT OF OTHER CARDIOVASCULAR FUNCTION STUDY: ICD-10-CM

## 2022-03-12 DIAGNOSIS — I50.9 HEART FAILURE, UNSPECIFIED: ICD-10-CM

## 2022-03-12 DIAGNOSIS — E78.5 HYPERLIPIDEMIA, UNSPECIFIED: ICD-10-CM

## 2022-03-12 LAB
A1C WITH ESTIMATED AVERAGE GLUCOSE RESULT: 5.7 % — HIGH (ref 4–5.6)
ALBUMIN SERPL ELPH-MCNC: 2.6 G/DL — LOW (ref 3.3–5)
ALP SERPL-CCNC: 103 U/L — SIGNIFICANT CHANGE UP (ref 40–120)
ALT FLD-CCNC: 43 U/L — SIGNIFICANT CHANGE UP (ref 10–45)
ANION GAP SERPL CALC-SCNC: 15 MMOL/L — SIGNIFICANT CHANGE UP (ref 5–17)
APTT BLD: 58.7 SEC — HIGH (ref 27.5–35.5)
APTT BLD: 67.1 SEC — HIGH (ref 27.5–35.5)
APTT BLD: 73.8 SEC — HIGH (ref 27.5–35.5)
AST SERPL-CCNC: 142 U/L — HIGH (ref 10–40)
BASOPHILS # BLD AUTO: 0.02 K/UL — SIGNIFICANT CHANGE UP (ref 0–0.2)
BASOPHILS NFR BLD AUTO: 0.1 % — SIGNIFICANT CHANGE UP (ref 0–2)
BILIRUB SERPL-MCNC: 0.3 MG/DL — SIGNIFICANT CHANGE UP (ref 0.2–1.2)
BLD GP AB SCN SERPL QL: NEGATIVE — SIGNIFICANT CHANGE UP
BUN SERPL-MCNC: 65 MG/DL — HIGH (ref 7–23)
CALCIUM SERPL-MCNC: 8.6 MG/DL — SIGNIFICANT CHANGE UP (ref 8.4–10.5)
CHLORIDE SERPL-SCNC: 115 MMOL/L — HIGH (ref 96–108)
CHOLEST SERPL-MCNC: 106 MG/DL — SIGNIFICANT CHANGE UP
CO2 SERPL-SCNC: 12 MMOL/L — LOW (ref 22–31)
COVID-19 NUCLEOCAPSID GAM AB INTERP: POSITIVE
COVID-19 NUCLEOCAPSID TOTAL GAM ANTIBODY RESULT: 103 INDEX — HIGH
COVID-19 SPIKE DOMAIN AB INTERP: POSITIVE
COVID-19 SPIKE DOMAIN ANTIBODY RESULT: >250 U/ML — HIGH
CREAT SERPL-MCNC: 3.78 MG/DL — HIGH (ref 0.5–1.3)
EGFR: 16 ML/MIN/1.73M2 — LOW
EOSINOPHIL # BLD AUTO: 0.16 K/UL — SIGNIFICANT CHANGE UP (ref 0–0.5)
EOSINOPHIL NFR BLD AUTO: 1 % — SIGNIFICANT CHANGE UP (ref 0–6)
ESTIMATED AVERAGE GLUCOSE: 117 MG/DL — HIGH (ref 68–114)
GLUCOSE BLDC GLUCOMTR-MCNC: 109 MG/DL — HIGH (ref 70–99)
GLUCOSE BLDC GLUCOMTR-MCNC: 113 MG/DL — HIGH (ref 70–99)
GLUCOSE BLDC GLUCOMTR-MCNC: 120 MG/DL — HIGH (ref 70–99)
GLUCOSE BLDC GLUCOMTR-MCNC: 123 MG/DL — HIGH (ref 70–99)
GLUCOSE SERPL-MCNC: 111 MG/DL — HIGH (ref 70–99)
HCT VFR BLD CALC: 27.5 % — LOW (ref 39–50)
HCV AB S/CO SERPL IA: 0.16 S/CO — SIGNIFICANT CHANGE UP (ref 0–0.99)
HCV AB SERPL-IMP: SIGNIFICANT CHANGE UP
HDLC SERPL-MCNC: 33 MG/DL — LOW
HGB BLD-MCNC: 9.6 G/DL — LOW (ref 13–17)
IMM GRANULOCYTES NFR BLD AUTO: 1.6 % — HIGH (ref 0–1.5)
INR BLD: 1.37 RATIO — HIGH (ref 0.88–1.16)
LIPID PNL WITH DIRECT LDL SERPL: 54 MG/DL — SIGNIFICANT CHANGE UP
LYMPHOCYTES # BLD AUTO: 0.88 K/UL — LOW (ref 1–3.3)
LYMPHOCYTES # BLD AUTO: 5.4 % — LOW (ref 13–44)
MAGNESIUM SERPL-MCNC: 2 MG/DL — SIGNIFICANT CHANGE UP (ref 1.6–2.6)
MCHC RBC-ENTMCNC: 26.2 PG — LOW (ref 27–34)
MCHC RBC-ENTMCNC: 34.9 GM/DL — SIGNIFICANT CHANGE UP (ref 32–36)
MCV RBC AUTO: 75.1 FL — LOW (ref 80–100)
MONOCYTES # BLD AUTO: 1.06 K/UL — HIGH (ref 0–0.9)
MONOCYTES NFR BLD AUTO: 6.4 % — SIGNIFICANT CHANGE UP (ref 2–14)
NEUTROPHILS # BLD AUTO: 14.05 K/UL — HIGH (ref 1.8–7.4)
NEUTROPHILS NFR BLD AUTO: 85.5 % — HIGH (ref 43–77)
NON HDL CHOLESTEROL: 73 MG/DL — SIGNIFICANT CHANGE UP
NRBC # BLD: 0 /100 WBCS — SIGNIFICANT CHANGE UP (ref 0–0)
PHOSPHATE SERPL-MCNC: 5.7 MG/DL — HIGH (ref 2.5–4.5)
PLATELET # BLD AUTO: 254 K/UL — SIGNIFICANT CHANGE UP (ref 150–400)
POTASSIUM SERPL-MCNC: 4 MMOL/L — SIGNIFICANT CHANGE UP (ref 3.5–5.3)
POTASSIUM SERPL-SCNC: 4 MMOL/L — SIGNIFICANT CHANGE UP (ref 3.5–5.3)
PROT SERPL-MCNC: 6.2 G/DL — SIGNIFICANT CHANGE UP (ref 6–8.3)
PROTHROM AB SERPL-ACNC: 16 SEC — HIGH (ref 10.5–13.4)
RBC # BLD: 3.66 M/UL — LOW (ref 4.2–5.8)
RBC # FLD: 15.3 % — HIGH (ref 10.3–14.5)
RH IG SCN BLD-IMP: POSITIVE — SIGNIFICANT CHANGE UP
SARS-COV-2 IGG+IGM SERPL QL IA: 103 INDEX — HIGH
SARS-COV-2 IGG+IGM SERPL QL IA: >250 U/ML — HIGH
SARS-COV-2 IGG+IGM SERPL QL IA: POSITIVE
SARS-COV-2 IGG+IGM SERPL QL IA: POSITIVE
SARS-COV-2 RNA SPEC QL NAA+PROBE: SIGNIFICANT CHANGE UP
SODIUM SERPL-SCNC: 142 MMOL/L — SIGNIFICANT CHANGE UP (ref 135–145)
TRIGL SERPL-MCNC: 95 MG/DL — SIGNIFICANT CHANGE UP
TSH SERPL-MCNC: 1.6 UIU/ML — SIGNIFICANT CHANGE UP (ref 0.27–4.2)
WBC # BLD: 16.44 K/UL — HIGH (ref 3.8–10.5)
WBC # FLD AUTO: 16.44 K/UL — HIGH (ref 3.8–10.5)

## 2022-03-12 PROCEDURE — 93306 TTE W/DOPPLER COMPLETE: CPT | Mod: 26

## 2022-03-12 PROCEDURE — 99223 1ST HOSP IP/OBS HIGH 75: CPT

## 2022-03-12 PROCEDURE — 99233 SBSQ HOSP IP/OBS HIGH 50: CPT | Mod: GC

## 2022-03-12 RX ORDER — ATORVASTATIN CALCIUM 80 MG/1
40 TABLET, FILM COATED ORAL AT BEDTIME
Refills: 0 | Status: DISCONTINUED | OUTPATIENT
Start: 2022-03-12 | End: 2022-04-01

## 2022-03-12 RX ORDER — IPRATROPIUM/ALBUTEROL SULFATE 18-103MCG
3 AEROSOL WITH ADAPTER (GRAM) INHALATION ONCE
Refills: 0 | Status: COMPLETED | OUTPATIENT
Start: 2022-03-12 | End: 2022-03-12

## 2022-03-12 RX ORDER — IPRATROPIUM/ALBUTEROL SULFATE 18-103MCG
3 AEROSOL WITH ADAPTER (GRAM) INHALATION EVERY 6 HOURS
Refills: 0 | Status: DISCONTINUED | OUTPATIENT
Start: 2022-03-12 | End: 2022-04-01

## 2022-03-12 RX ADMIN — PANTOPRAZOLE SODIUM 40 MILLIGRAM(S): 20 TABLET, DELAYED RELEASE ORAL at 08:55

## 2022-03-12 RX ADMIN — BUDESONIDE AND FORMOTEROL FUMARATE DIHYDRATE 2 PUFF(S): 160; 4.5 AEROSOL RESPIRATORY (INHALATION) at 08:42

## 2022-03-12 RX ADMIN — Medication 3 MILLILITER(S): at 14:39

## 2022-03-12 RX ADMIN — SENNA PLUS 2 TABLET(S): 8.6 TABLET ORAL at 22:15

## 2022-03-12 RX ADMIN — CARVEDILOL PHOSPHATE 12.5 MILLIGRAM(S): 80 CAPSULE, EXTENDED RELEASE ORAL at 05:29

## 2022-03-12 RX ADMIN — Medication 3 MILLILITER(S): at 18:23

## 2022-03-12 RX ADMIN — ATORVASTATIN CALCIUM 40 MILLIGRAM(S): 80 TABLET, FILM COATED ORAL at 22:14

## 2022-03-12 RX ADMIN — CLOPIDOGREL BISULFATE 75 MILLIGRAM(S): 75 TABLET, FILM COATED ORAL at 12:24

## 2022-03-12 RX ADMIN — CHLORHEXIDINE GLUCONATE 1 APPLICATION(S): 213 SOLUTION TOPICAL at 05:19

## 2022-03-12 RX ADMIN — Medication 600 MILLIGRAM(S): at 18:23

## 2022-03-12 RX ADMIN — HEPARIN SODIUM 12 UNIT(S)/HR: 5000 INJECTION INTRAVENOUS; SUBCUTANEOUS at 10:15

## 2022-03-12 RX ADMIN — Medication 4 MILLIGRAM(S): at 22:15

## 2022-03-12 RX ADMIN — CARVEDILOL PHOSPHATE 12.5 MILLIGRAM(S): 80 CAPSULE, EXTENDED RELEASE ORAL at 18:23

## 2022-03-12 RX ADMIN — BUDESONIDE AND FORMOTEROL FUMARATE DIHYDRATE 2 PUFF(S): 160; 4.5 AEROSOL RESPIRATORY (INHALATION) at 20:15

## 2022-03-12 RX ADMIN — HEPARIN SODIUM 12 UNIT(S)/HR: 5000 INJECTION INTRAVENOUS; SUBCUTANEOUS at 20:16

## 2022-03-12 RX ADMIN — POLYETHYLENE GLYCOL 3350 17 GRAM(S): 17 POWDER, FOR SOLUTION ORAL at 12:24

## 2022-03-12 NOTE — CHART NOTE - NSCHARTNOTEFT_GEN_A_CORE
CCU Transfer Note    Transfer from: CCU    Transfer to: (  ) Medicine    ( x ) Telemetry     (   ) RCU        (    ) Palliative         (   ) Stroke Unit       (  ) MICU   (   ) __________________    Accepting Physician: Dr. Rosalino Nicole    Signout given to: Dr. Nicole, MAR     CCU COURSE: 75 yo M w/ pmh of HTN, HLD, PVD, R AKA who was brought by EMS to [facility]  after a mechanical fall from his wheelchair at home. NO LOC or head injury and he denies dizziness syncope. He was admitted to [facility] for sepsis due to PNA w/ troponemia 2/2 to demand on 3/6/2022. He was also found to be in CHF as well as a LE arterial embolism. Pt received Azithromycin and Ceftriaxone for a B/L PNA seen on CT Chest. Hypoxic resp failure improved by day 2 of ICU stay. On 3/8 pt begin to complain of severe LLE pain and found to have LLE CFA and prox SFA occlusions.  Pt was started on a heparin gtt and an arterial angiogram was attempted by Vascular surgery however it was unsuccessful. During the aortogram he became hypertensive with RAFIQ and decision was made to abort the case. BKA was recommended. Pt was noted to have elevated CKs and managed for rhabdo. Pre-op stress testing for BKA revealed multiple ischemic regions. Pt was transferred to [facility] for cardiac cath.       PAST MEDICAL & SURGICAL HISTORY:  HTN (hypertension)    Hyperlipidemia    PVD (peripheral vascular disease)    S/P BKA (below knee amputation), right        Vital Signs Last 24 Hrs  T(C): 36.4 (12 Mar 2022 07:29), Max: 36.9 (11 Mar 2022 19:00)  T(F): 97.5 (12 Mar 2022 07:29), Max: 98.5 (11 Mar 2022 19:00)  HR: 88 (12 Mar 2022 08:11) (66 - 96)  BP: 147/70 (11 Mar 2022 20:00) (143/68 - 162/75)  BP(mean): 100 (11 Mar 2022 20:00) (95 - 108)  RR: 18 (12 Mar 2022 08:11) (18 - 34)  SpO2: 96% (12 Mar 2022 08:11) (95% - 100%)  I&O's Summary    11 Mar 2022 07:01  -  12 Mar 2022 07:00  --------------------------------------------------------  IN: 516 mL / OUT: 1551 mL / NET: -1035 mL    12 Mar 2022 06:01  -  12 Mar 2022 08:32  --------------------------------------------------------  IN: 12 mL / OUT: 50 mL / NET: -38 mL      Allergies    No Known Allergies    Intolerances      MEDICATIONS  (STANDING):  atorvastatin 20 milliGRAM(s) Oral at bedtime  budesonide 160 MICROgram(s)/formoterol 4.5 MICROgram(s) Inhaler 2 Puff(s) Inhalation two times a day  carvedilol 12.5 milliGRAM(s) Oral every 12 hours  chlorhexidine 2% Cloths 1 Application(s) Topical <User Schedule>  clopidogrel Tablet 75 milliGRAM(s) Oral daily  doxazosin 4 milliGRAM(s) Oral at bedtime  heparin  Infusion 1200 Unit(s)/Hr (12 mL/Hr) IV Continuous <Continuous>  insulin lispro (ADMELOG) corrective regimen sliding scale   SubCutaneous three times a day before meals  pantoprazole    Tablet 40 milliGRAM(s) Oral before breakfast  polyethylene glycol 3350 17 Gram(s) Oral daily  senna 2 Tablet(s) Oral at bedtime    MEDICATIONS  (PRN):  albuterol/ipratropium for Nebulization 3 milliLiter(s) Nebulizer every 6 hours PRN Wheezing      Adult Advanced Hemodynamics Last 24 Hrs  CVP(mm Hg): --  CVP(cm H2O): --  CO: --  CI: --  PA: --  PA(mean): --  PCWP: --  SVR: --  SVRI: --  PVR: --  PVRI: --                              9.6    16.44 )-----------( 254      ( 12 Mar 2022 04:33 )             27.5     03-12    142  |  115<H>  |  65<H>  ----------------------------<  111<H>  4.0   |  12<L>  |  3.78<H>    Ca    8.6      12 Mar 2022 04:33  Phos  5.7     03-12  Mg     2.0     03-12    TPro  6.2  /  Alb  2.6<L>  /  TBili  0.3  /  DBili  x   /  AST  142<H>  /  ALT  43  /  AlkPhos  103  03-12    PT/INR - ( 12 Mar 2022 04:33 )   PT: 16.0 sec;   INR: 1.37 ratio         PTT - ( 11 Mar 2022 23:55 )  PTT:73.8 sec        ABG - ( 11 Mar 2022 17:47 )  pH, Arterial: 7.32  pH, Blood: x     /  pCO2: 26    /  pO2: 90    / HCO3: 13    / Base Excess: -11.3 /  SaO2: 97.9            ASSESSMENT & PLAN:   75 yo M w/ pmh of HTN, HLD, PVD, R AKA who was brought by EMS to [facility]  after a mechanical fall from his wheelchair found to have CHF exacerbation as well as acute LLE arterial occlusions w/ unsuccessful angiogram and e/o coronary artery disease on stress test now tx for PCI and cardiac clearance for possible LLE BKA    Neuro  - A and O x 4  - Dilaudid and oxy for pain @OSH . Denies pain at this time only on palpation    Resp  - On 2 L NC, + cough  - Completed course of ABx for PNA  - CXR slight improvement from intial imagin on 3/5 demonstrating b/l opacities RML/RLL LLL     CV  + stress test  - Plan for [facility] likely Sunday for clearance for L BKA  - He presented w elevated cardiac enzymes     HFrEF 45%  - EF 45%, will check Chest XR and POCUS but likely needs further diuresis  - Keep net neg  - Continue coreg 12.5 for afterload reduction- pt would be great candidate for ARB/ACEi or ARNi pending downtrend in Cr and likely ICM     GI  - DASH CC diet  - PPI    /Renal  BAUDILIO on CKD   - s/p contrast load and PNA/sepsis, downtrending   - autodiuresisng well, maintain net neg 500cc - 1L daily   - Olivarez in place from OSH check UA    ID  CAP +/- sepsis  - Was tx for PNA x 5d course   - Monitor off antibiotics for now- WBCs likely in setting now of ischemic LLE - pt remains asfebrile w/ Clear CXR     Heme  - Continue heparin gtt for CFA and SFA occlusions  - pt w/ mild microcytic anemia , check iron studies- although consider AOCD via CKD     Endo  - A1c 5.7 and TSH normal     Vasc  L CFA and SFA occlusions w unsuccessful angio. Recommending L BKA possibly AKA  Pt amenable to surgery  - Continue heparin gtt, vasc surgery made aware here although recs for transfer back to [facility] for operation once cath complete           FOR FOLLOW UP:  - [facility] timing here at [facility]   - Transfer planning back to [facility] for L BKA

## 2022-03-12 NOTE — PROGRESS NOTE ADULT - ASSESSMENT
77 yo M w/ pmh of HTN, HLD, PVD, R AKA who was brought by EMS to [facility]  after a mechanical fall from his wheelchair found to have CHF exacerbation as well as acute LLE arterial occlusions w/ unsuccessful angiogram and e/o coronary artery disease on stress test now transfered for PCI and cardiac clearance for possible LLE BKA    - pos stress test, Plan for [facility] likely Sunday for clearance for L BKA  - elevated enzymes appreciated, may have underlying CAD given extensive vascular disease, likely component of CKD as well   - Continue coreg 12.5 mg bid   - EF reported 45, please repeat TTE   - Continue heparin gtt for CFA and SFA occlusions  - agree with plavix  - please increase atorva to 40  - L CFA and SFA occlusions w unsuccessful angio. Recommending L BKA possibly AKA  - vascular aware, unclear if will return to [facility] for surgery, appreciate recs  - further risk stratification pending [facility]

## 2022-03-12 NOTE — PROGRESS NOTE ADULT - SUBJECTIVE AND OBJECTIVE BOX
PATIENT:  THI HASTINGS  [id number]    CHIEF COMPLAINT:  Patient is a 76y old  Male who presents with a chief complaint of Cardiac clearnace for ischemic limb (11 Mar 2022 20:26)      INTERVAL HISTORYOVERNIGHT EVENTS:      REVIEW OF SYSTEMS:    Constitutional:     [ x] negative [ ] fevers [ ] chills [ ] weight loss [ ] weight gain  HEENT:                  [x ] negative [ ] dry eyes [ ] eye irritation [ ] postnasal drip [ ] nasal congestion  CV:                         [x ] negative  [ ] chest pain [ ] orthopnea [ ] palpitations [ ] murmur  Resp:                     [x ] negative [ ] cough [ ] shortness of breath [ ] dyspnea [ ] wheezing [ ] sputum [ ] hemoptysis  GI:                          [ x] negative [ ] nausea [ ] vomiting [ ] diarrhea [ ] constipation [ ] abd pain [ ] dysphagia   :                        [x ] negative [ ] dysuria [ ] nocturia [ ] hematuria [ ] increased urinary frequency  Musculoskeletal: [x] LLE pain [ ] negative [ ] back pain [ ] myalgias [ ] arthralgias [ ] fracture  Skin:                       [ x] negative [ ] rash [ ] itch  Neurological:        [x ] negative [ ] headache [ ] dizziness [ ] syncope [ ] weakness [ ] numbness  Psychiatric:           [xx ] negative [ ] anxiety [ ] depression  Endocrine:            [x ] negative [ ] diabetes [ ] thyroid problem  Heme/Lymph:      [ x] negative [ ] anemia [ ] bleeding problem  Allergic/Immune: [x ] negative [ ] itchy eyes [ ] nasal discharge [ ] hives [ ] angioedema    [ ] All other systems negative  [ ] Unable to assess ROS because ________.    MEDICATIONS:  MEDICATIONS  (STANDING):  atorvastatin 20 milliGRAM(s) Oral at bedtime  budesonide 160 MICROgram(s)/formoterol 4.5 MICROgram(s) Inhaler 2 Puff(s) Inhalation two times a day  carvedilol 12.5 milliGRAM(s) Oral every 12 hours  chlorhexidine 2% Cloths 1 Application(s) Topical <User Schedule>  clopidogrel Tablet 75 milliGRAM(s) Oral daily  dextrose 40% Gel 15 Gram(s) Oral once  dextrose 5%. 1000 milliLiter(s) (50 mL/Hr) IV Continuous <Continuous>  dextrose 5%. 1000 milliLiter(s) (100 mL/Hr) IV Continuous <Continuous>  dextrose 50% Injectable 25 Gram(s) IV Push once  dextrose 50% Injectable 12.5 Gram(s) IV Push once  dextrose 50% Injectable 25 Gram(s) IV Push once  doxazosin 4 milliGRAM(s) Oral at bedtime  glucagon  Injectable 1 milliGRAM(s) IntraMuscular once  heparin  Infusion 1200 Unit(s)/Hr (12 mL/Hr) IV Continuous <Continuous>  insulin lispro (ADMELOG) corrective regimen sliding scale   SubCutaneous three times a day before meals  pantoprazole    Tablet 40 milliGRAM(s) Oral before breakfast  polyethylene glycol 3350 17 Gram(s) Oral daily  senna 2 Tablet(s) Oral at bedtime    MEDICATIONS  (PRN):  albuterol/ipratropium for Nebulization 3 milliLiter(s) Nebulizer every 6 hours PRN Wheezing      ALLERGIES:  Allergies    No Known Allergies    Intolerances        OBJECTIVE:  ICU Vital Signs Last 24 Hrs  T(C): 36.4 (12 Mar 2022 07:29), Max: 36.9 (11 Mar 2022 19:00)  T(F): 97.5 (12 Mar 2022 07:29), Max: 98.5 (11 Mar 2022 19:00)  HR: 84 (12 Mar 2022 07:29) (66 - 96)  BP: 147/70 (11 Mar 2022 20:00) (143/68 - 162/75)  BP(mean): 100 (11 Mar 2022 20:00) (95 - 108)  ABP: 140/66 (12 Mar 2022 07:29) (133/65 - 206/71)  ABP(mean): 91 (12 Mar 2022 07:29) (84 - 103)  RR: 18 (12 Mar 2022 07:29) (18 - 34)  SpO2: 98% (12 Mar 2022 07:29) (95% - 100%)      Adult Advanced Hemodynamics Last 24 Hrs  CVP(mm Hg): --  CVP(cm H2O): --  CO: --  CI: --  PA: --  PA(mean): --  PCWP: --  SVR: --  SVRI: --  PVR: --  PVRI: --  CAPILLARY BLOOD GLUCOSE      POCT Blood Glucose.: 136 mg/dL (11 Mar 2022 18:19)    CAPILLARY BLOOD GLUCOSE      POCT Blood Glucose.: 136 mg/dL (11 Mar 2022 18:19)    I&O's Summary    11 Mar 2022 07:01  -  12 Mar 2022 07:00  --------------------------------------------------------  IN: 516 mL / OUT: 1551 mL / NET: -1035 mL    12 Mar 2022 06:01  -  12 Mar 2022 07:53  --------------------------------------------------------  IN: 12 mL / OUT: 50 mL / NET: -38 mL      Daily Height in cm: 154.17 (11 Mar 2022 16:55)    Daily Weight in k.2 (12 Mar 2022 03:00)    PHYSICAL EXAMINATION:  General: WN/WD NAD  HEENT: PERRLA, EOMI, moist mucous membranes  Neurology: A&Ox3, nonfocal, GREEN x 4  Respiratory: CTA B/L, normal respiratory effort, no wheezes, crackles, rales  CV: RRR, S1S2, no murmurs, rubs or gallops  Abdominal: Soft, NT, ND +BS, Last BM  Extremities: No edema, + peripheral pulses  Incisions:   Tubes:    LABS:  ABG - ( 11 Mar 2022 17:47 )  pH, Arterial: 7.32  pH, Blood: x     /  pCO2: 26    /  pO2: 90    / HCO3: 13    / Base Excess: -11.3 /  SaO2: 97.9                                    9.6    16.44 )-----------( 254      ( 12 Mar 2022 04:33 )             27.5     03-12    142  |  115<H>  |  65<H>  ----------------------------<  111<H>  4.0   |  12<L>  |  3.78<H>    Ca    8.6      12 Mar 2022 04:33  Phos  5.7     03-12  Mg     2.0     03-12    TPro  6.2  /  Alb  2.6<L>  /  TBili  0.3  /  DBili  x   /  AST  142<H>  /  ALT  43  /  AlkPhos  103  03-12    LIVER FUNCTIONS - ( 12 Mar 2022 04:33 )  Alb: 2.6 g/dL / Pro: 6.2 g/dL / ALK PHOS: 103 U/L / ALT: 43 U/L / AST: 142 U/L / GGT: x           PT/INR - ( 12 Mar 2022 04:33 )   PT: 16.0 sec;   INR: 1.37 ratio         PTT - ( 11 Mar 2022 23:55 )  PTT:73.8 sec            77 yo M w/ pmh of HTN, HLD, PVD, R AKA who was brought by EMS to [facility]  after a mechanical fall from his wheelchair found to have CHF exacerbation as well as acute LLE arterial occlusions w/ unsuccessful angiogram and e/o coronary artery disease on stress test now tx for PCI and cardiac clearance for possible LLE BKA    Neuro  - A and O x 4  - Dilaudid and oxy for pain @OSH . Denies pain at this time only on palpation    Resp  - On 2 L NC, + cough  - Completed course of ABx for PNA  - CXR slight improvement from intial imagin on 3/5 demonstrating b/l opacities RML/RLL LLL     CV  + stress test  - Plan for [facility] likely  for clearance for L BKA  - He presented w elevated cardiac enzymes     HFrEF 45%  - EF 45%, will check Chest XR and POCUS but likely needs further diuresis  - Keep net neg  - Continue coreg 12.5 for afterload reduction- pt would be great candidate for ARB/ACEi or ARNi pending downtrend in Cr and likely ICM     GI  - DASH CC diet  - PPI    /Renal  BAUDILIO on CKD   - s/p contrast load and PNA/sepsis, downtrending   - autodiuresisng well, maintain net neg 500cc - 1L daily   - Olivarez in place from OSH check UA    ID  CAP +/- sepsis  - Was tx for PNA x 5d course   - Monitor off antibiotics for now- WBCs likely in setting now of ischemic LLE - pt remains asfebrile w/ Clear CXR     Heme  - Continue heparin gtt for CFA and SFA occlusions  - pt w/ mild microcytic anemia , check iron studies- although consider AOCD via CKD     Endo  - A1c 5.7 and TSH normal     Vasc  L CFA and SFA occlusions w unsuccessful angio. Recommending L BKA possibly AKA  Pt amenable to surgery  - Continue heparin gtt, vasc surgery made aware here although recs for transfer back to [facility] for operation once cath complete

## 2022-03-12 NOTE — PROGRESS NOTE ADULT - PROBLEM SELECTOR PLAN 2
+ stress test at OSH  - Plan for [facility] likely Sunday for risk stratification for L BKA  - Cardiology following

## 2022-03-12 NOTE — PROGRESS NOTE ADULT - SUBJECTIVE AND OBJECTIVE BOX
INTERNAL MEDICINE ATTENDING TRANSFER NOTE    Hospital course:  77 yo M w/ pmh of HTN, HLD, PVD, R AKA who was brought by EMS to [facility]  after a mechanical fall from his wheelchair at home. NO LOC or head injury and he denies dizziness syncope. He was admitted to [facility] for sepsis due to PNA w/ troponemia 2/2 to demand on 3/6/2022. He was also found to be in CHF as well as a LE arterial embolism. Pt received Azithromycin and Ceftriaxone for a B/L PNA seen on CT Chest. Hypoxic resp failure improved by day 2 of ICU stay. On 3/8 pt begin to complain of severe LLE pain and found to have LLE CFA and prox SFA occlusions.  Pt was started on a heparin gtt and an arterial angiogram was attempted by Vascular surgery however it was unsuccessful. During the aortogram he became hypertensive with RAFIQ and decision was made to abort the case. BKA was recommended. Pt was noted to have elevated CKs and managed for rhabdo. Pre-op stress testing for BKA revealed multiple ischemic regions. Pt was transferred to [facility] for cardiac cath.      SUBJECTIVE: Seen at bedside. Complaining of pain in left foot below ankle as well as feeling weak    Home Medications:      PAST MEDICAL & SURGICAL HISTORY:  HTN (hypertension)    Hyperlipidemia    PVD (peripheral vascular disease)    S/P BKA (below knee amputation), right        Review of Systems:   CONSTITUTIONAL: No fever, weight loss, or fatigue  EYES: No eye pain, visual disturbances, or discharge  ENMT:  No difficulty hearing, tinnitus, vertigo; No sinus or throat pain  NECK: No pain or stiffness  RESPIRATORY: No cough, wheezing, chills or hemoptysis; No shortness of breath  CARDIOVASCULAR: No chest pain, palpitations, dizziness, + left LE leg swelling  GASTROINTESTINAL: No abdominal or epigastric pain. No nausea, vomiting, or hematemesis; No diarrhea or constipation. No melena or hematochezia.  GENITOURINARY: No dysuria, frequency, hematuria, or incontinence  NEUROLOGICAL: No headaches, memory loss, loss of strength, numbness, or tremors  MUSCULOSKELETAL: Left foot pain  PSYCHIATRIC: No depression, anxiety, mood swings, or difficulty sleeping      Allergies    No Known Allergies    Intolerances        Social History: former smoker 30 pack year quit 10 years ago , past ETOH use quit    FAMILY HISTORY:   Noncontributory    Vital Signs Last 24 Hrs  T(C): 37 (12 Mar 2022 11:44), Max: 37 (12 Mar 2022 11:44)  T(F): 98.6 (12 Mar 2022 11:44), Max: 98.6 (12 Mar 2022 11:44)  HR: 90 (12 Mar 2022 11:44) (66 - 96)  BP: 157/81 (12 Mar 2022 11:44) (132/90 - 162/75)  BP(mean): 90 (12 Mar 2022 09:00) (90 - 108)  RR: 18 (12 Mar 2022 11:44) (18 - 34)  SpO2: 94% (12 Mar 2022 11:44) (94% - 100%)  CAPILLARY BLOOD GLUCOSE      POCT Blood Glucose.: 109 mg/dL (12 Mar 2022 08:44)  POCT Blood Glucose.: 136 mg/dL (11 Mar 2022 18:19)      PHYSICAL EXAM:  GENERAL: NAD  HEAD:  NCAT  EYES: EOMI  NECK: Supple, No JVD  CHEST/LUNG: Clear to auscultation bilaterally; No wheeze  HEART: Reg rate. No M/R/G.  ABDOMEN: Soft, NT, distended, Bowel sounds present  EXTREMITIES:  Right BKA, LLE with edema. Left foot cool to touch below knee  PSYCH: AAOx3  NEUROLOGY: non-focal    LABS:                        9.6    16.44 )-----------( 254      ( 12 Mar 2022 04:33 )             27.5     03-12    142  |  115<H>  |  65<H>  ----------------------------<  111<H>  4.0   |  12<L>  |  3.78<H>    Ca    8.6      12 Mar 2022 04:33  Phos  5.7     03-12  Mg     2.0     03-12    TPro  6.2  /  Alb  2.6<L>  /  TBili  0.3  /  DBili  x   /  AST  142<H>  /  ALT  43  /  AlkPhos  103  03-12    PT/INR - ( 12 Mar 2022 04:33 )   PT: 16.0 sec;   INR: 1.37 ratio         PTT - ( 12 Mar 2022 04:33 )  PTT:58.7 sec            MEDICATIONS  (STANDING):  atorvastatin 40 milliGRAM(s) Oral at bedtime  budesonide 160 MICROgram(s)/formoterol 4.5 MICROgram(s) Inhaler 2 Puff(s) Inhalation two times a day  carvedilol 12.5 milliGRAM(s) Oral every 12 hours  chlorhexidine 2% Cloths 1 Application(s) Topical <User Schedule>  clopidogrel Tablet 75 milliGRAM(s) Oral daily  doxazosin 4 milliGRAM(s) Oral at bedtime  heparin  Infusion 1200 Unit(s)/Hr (12 mL/Hr) IV Continuous <Continuous>  insulin lispro (ADMELOG) corrective regimen sliding scale   SubCutaneous three times a day before meals  pantoprazole    Tablet 40 milliGRAM(s) Oral before breakfast  polyethylene glycol 3350 17 Gram(s) Oral daily  senna 2 Tablet(s) Oral at bedtime    MEDICATIONS  (PRN):  albuterol/ipratropium for Nebulization 3 milliLiter(s) Nebulizer every 6 hours PRN Wheezing    Imaging:   ACC: [id number] EXAM:  XR CHEST AP OR PA 1V                          PROCEDURE DATE:  03/11/2022          INTERPRETATION:  EXAMINATION: XR CHEST    CLINICAL INDICATION: Abnormal Chest Sounds    TECHNIQUE: Single portable view of the chest was obtained.    COMPARISON: None.    FINDINGS:    Heart size cannot be appropriately assessed in this projection.  Bibasilar opacities may represent atelectasis and/or layering pleural   effusion.  No discretion pneumothorax.  No acute osseous abnormalities.    IMPRESSION:  Bibasilar opacities may represent atelectasis and/or layering pleural   effusion.

## 2022-03-12 NOTE — PROGRESS NOTE ADULT - PROBLEM SELECTOR PLAN 1
Found to have LLE CFA and prox SFA occlusions. Arterial angiogram was attempted by Vascular surgery at OSH however it was unsuccessful.  - c/w Heparin GGT and plavix  - Plan for [facility] for risk stratification for possible BKA back at [place]

## 2022-03-12 NOTE — PROGRESS NOTE ADULT - PROBLEM SELECTOR PLAN 3
Initially treated at OSH for acute on chronic heart failure. Currently off diuretics and O2 therapy. Is autodiuresing.  - EF 45% at OSH, repeat TTE here pending  - continue coreg 12.5mg BID  - Off ACE-I/ARB 2/2 BAUDILIO on CKD

## 2022-03-12 NOTE — PROGRESS NOTE ADULT - ATTENDING COMMENTS
76 year old man with HTN, PVD, R AKA brought by EMS to [facility] after a mechanical fall from his wheelchair found CHF exacerbation as well as acute LLE arterial occlusions and attempted peripheral angiogram was unsuccessful. Patient was evaluated for LLE BKA and had abnormal nuclear stress test, thus transferred for cardiac catheterization and evaluation prior to planned BKA. Maintained on heparin infusion, Vascular surgery evaluating. Seek IV diuresis. Coronary angiography planned tomorrow, has advanced CKD and needs optimization.    To contact call Cardiology Fellow or Attending as listed on [email or web address]: [email or web address]. 76 year old man with HTN, PVD, R AKA brought by EMS to [facility] after a mechanical fall from his wheelchair found exacerbation of heart failure, reduced EF 45%, apparent acute on chronic systolic heart failure and has acute LLE arterial occlusions and attempted peripheral angiogram was unsuccessful. Patient was evaluated for LLE BKA and had abnormal nuclear stress test, thus transferred for cardiac catheterization and evaluation prior to planned BKA. Maintained on heparin infusion, Vascular surgery evaluating. Seek IV diuresis. Coronary angiography planned tomorrow, has advanced CKD and needs optimization.    To contact call Cardiology Fellow or Attending as listed on [email or web address]: [email or web address].

## 2022-03-12 NOTE — PROGRESS NOTE ADULT - ASSESSMENT
77 yo M w/ pmh of HTN, HLD, PVD s/p R AKA who was brought by EMS to [facility] after a mechanical fall from his wheelchair found to have CHF exacerbation, PNA as well as acute LLE arterial occlusions w/ unsuccessful angiogram. Stress test at OSH was positive, transferred for angiogram for cardiac clearance for possible LLE BKA

## 2022-03-12 NOTE — PROGRESS NOTE ADULT - SUBJECTIVE AND OBJECTIVE BOX
Patient seen and examined at bedside.    Overnight Events: No acute events, now out of CCU, ongoing Leg discomfort and weakness     Review Of Systems:   CONSTITUTIONAL: No weakness, fevers or chills  EYES/ENT: No visual changes;  No dysphagia  NECK: No pain or stiffness  RESPIRATORY: No cough, wheezing, hemoptysis  CARDIOVASCULAR: No chest pain or palpitations; No lower extremity edema  GASTROINTESTINAL: No abdominal or epigastric pain. No nausea, vomiting, or hematemesis; No diarrhea or constipation. No melena or hematochezia.  BACK: No back pain  GENITOURINARY: No dysuria, frequency or hematuria  NEUROLOGICAL: No numbness or weakness  SKIN: No itching, burning, rashes, or lesions   All other review of systems is negative unless indicated above.          Current Meds:  albuterol/ipratropium for Nebulization 3 milliLiter(s) Nebulizer every 6 hours PRN  atorvastatin 20 milliGRAM(s) Oral at bedtime  budesonide 160 MICROgram(s)/formoterol 4.5 MICROgram(s) Inhaler 2 Puff(s) Inhalation two times a day  carvedilol 12.5 milliGRAM(s) Oral every 12 hours  chlorhexidine 2% Cloths 1 Application(s) Topical <User Schedule>  clopidogrel Tablet 75 milliGRAM(s) Oral daily  doxazosin 4 milliGRAM(s) Oral at bedtime  heparin  Infusion 1200 Unit(s)/Hr IV Continuous <Continuous>  insulin lispro (ADMELOG) corrective regimen sliding scale   SubCutaneous three times a day before meals  pantoprazole    Tablet 40 milliGRAM(s) Oral before breakfast  polyethylene glycol 3350 17 Gram(s) Oral daily  senna 2 Tablet(s) Oral at bedtime      Vitals:  T(F): 98.5 (), Max: 98.5 ()  HR: 87 () (66 - 96)  BP: 144/85 () (132/90 - 162/75)  RR: 18 ()  SpO2: 98% ()  I&O's Summary    11 Mar 2022 07:01  -  12 Mar 2022 07:00  --------------------------------------------------------  IN: 516 mL / OUT: 1551 mL / NET: -1035 mL    12 Mar 2022 06:01  -  12 Mar 2022 11:08  --------------------------------------------------------  IN: 144 mL / OUT: 50 mL / NET: 94 mL        Physical Exam:  Appearance: No acute distress; well appearing  Eyes: PERRL, EOMI, pink conjunctiva  HEENT: Normal oral mucosa  Cardiovascular: RRR, S1, S2, no murmurs, rubs, or gallops; no edema; no JVD; s/p RLE amputation, LLE cool without palpable pulses   Respiratory: Clear to auscultation bilaterally  Gastrointestinal: soft, non-tender, non-distended with normal bowel sounds  Musculoskeletal: No clubbing; no joint deformity   Neurologic: Non-focal  Lymphatic: No lymphadenopathy  Psychiatry: AAOx3, mood & affect appropriate  Skin: No rashes, ecchymoses, or cyanosis                          9.6    16.44 )-----------( 254      ( 12 Mar 2022 04:33 )             27.5     03-12    142  |  115<H>  |  65<H>  ----------------------------<  111<H>  4.0   |  12<L>  |  3.78<H>    Ca    8.6      12 Mar 2022 04:33  Phos  5.7     -12  Mg     2.0     12    TPro  6.2  /  Alb  2.6<L>  /  TBili  0.3  /  DBili  x   /  AST  142<H>  /  ALT  43  /  AlkPhos  103  03-12    PT/INR - ( 12 Mar 2022 04:33 )   PT: 16.0 sec;   INR: 1.37 ratio         PTT - ( 12 Mar 2022 04:33 )  PTT:58.7 sec      Serum Pro-Brain Natriuretic Peptide: 5764 pg/mL ( @ 17:53)    Total Cholesterol: 106  LDL: --  HDL: 33  T

## 2022-03-13 LAB
ALBUMIN SERPL ELPH-MCNC: 2.7 G/DL — LOW (ref 3.3–5)
ALP SERPL-CCNC: 134 U/L — HIGH (ref 40–120)
ALT FLD-CCNC: 53 U/L — HIGH (ref 10–45)
ANION GAP SERPL CALC-SCNC: 20 MMOL/L — HIGH (ref 5–17)
APTT BLD: 32.3 SEC — SIGNIFICANT CHANGE UP (ref 27.5–35.5)
APTT BLD: 61.5 SEC — HIGH (ref 27.5–35.5)
AST SERPL-CCNC: 152 U/L — HIGH (ref 10–40)
BILIRUB SERPL-MCNC: 0.3 MG/DL — SIGNIFICANT CHANGE UP (ref 0.2–1.2)
BUN SERPL-MCNC: 58 MG/DL — HIGH (ref 7–23)
CALCIUM SERPL-MCNC: 8.5 MG/DL — SIGNIFICANT CHANGE UP (ref 8.4–10.5)
CHLORIDE SERPL-SCNC: 108 MMOL/L — SIGNIFICANT CHANGE UP (ref 96–108)
CO2 SERPL-SCNC: 13 MMOL/L — LOW (ref 22–31)
CREAT SERPL-MCNC: 3.2 MG/DL — HIGH (ref 0.5–1.3)
EGFR: 20 ML/MIN/1.73M2 — LOW
GLUCOSE BLDC GLUCOMTR-MCNC: 111 MG/DL — HIGH (ref 70–99)
GLUCOSE BLDC GLUCOMTR-MCNC: 112 MG/DL — HIGH (ref 70–99)
GLUCOSE BLDC GLUCOMTR-MCNC: 117 MG/DL — HIGH (ref 70–99)
GLUCOSE BLDC GLUCOMTR-MCNC: 125 MG/DL — HIGH (ref 70–99)
GLUCOSE SERPL-MCNC: 227 MG/DL — HIGH (ref 70–99)
HCT VFR BLD CALC: 30.6 % — LOW (ref 39–50)
HGB BLD-MCNC: 10.4 G/DL — LOW (ref 13–17)
MAGNESIUM SERPL-MCNC: 1.8 MG/DL — SIGNIFICANT CHANGE UP (ref 1.6–2.6)
MCHC RBC-ENTMCNC: 26.6 PG — LOW (ref 27–34)
MCHC RBC-ENTMCNC: 34 GM/DL — SIGNIFICANT CHANGE UP (ref 32–36)
MCV RBC AUTO: 78.3 FL — LOW (ref 80–100)
NRBC # BLD: 0 /100 WBCS — SIGNIFICANT CHANGE UP (ref 0–0)
PHOSPHATE SERPL-MCNC: 6.6 MG/DL — HIGH (ref 2.5–4.5)
PLATELET # BLD AUTO: 254 K/UL — SIGNIFICANT CHANGE UP (ref 150–400)
POTASSIUM SERPL-MCNC: 3.7 MMOL/L — SIGNIFICANT CHANGE UP (ref 3.5–5.3)
POTASSIUM SERPL-SCNC: 3.7 MMOL/L — SIGNIFICANT CHANGE UP (ref 3.5–5.3)
PROT SERPL-MCNC: 6.8 G/DL — SIGNIFICANT CHANGE UP (ref 6–8.3)
RBC # BLD: 3.91 M/UL — LOW (ref 4.2–5.8)
RBC # FLD: 16 % — HIGH (ref 10.3–14.5)
SODIUM SERPL-SCNC: 141 MMOL/L — SIGNIFICANT CHANGE UP (ref 135–145)
WBC # BLD: 17.5 K/UL — HIGH (ref 3.8–10.5)
WBC # FLD AUTO: 17.5 K/UL — HIGH (ref 3.8–10.5)

## 2022-03-13 PROCEDURE — 99232 SBSQ HOSP IP/OBS MODERATE 35: CPT

## 2022-03-13 RX ORDER — HEPARIN SODIUM 5000 [USP'U]/ML
1300 INJECTION INTRAVENOUS; SUBCUTANEOUS
Qty: 25000 | Refills: 0 | Status: DISCONTINUED | OUTPATIENT
Start: 2022-03-13 | End: 2022-03-16

## 2022-03-13 RX ADMIN — CHLORHEXIDINE GLUCONATE 1 APPLICATION(S): 213 SOLUTION TOPICAL at 05:13

## 2022-03-13 RX ADMIN — CARVEDILOL PHOSPHATE 12.5 MILLIGRAM(S): 80 CAPSULE, EXTENDED RELEASE ORAL at 05:11

## 2022-03-13 RX ADMIN — BUDESONIDE AND FORMOTEROL FUMARATE DIHYDRATE 2 PUFF(S): 160; 4.5 AEROSOL RESPIRATORY (INHALATION) at 08:03

## 2022-03-13 RX ADMIN — HEPARIN SODIUM 13 UNIT(S)/HR: 5000 INJECTION INTRAVENOUS; SUBCUTANEOUS at 16:39

## 2022-03-13 RX ADMIN — Medication 3 MILLILITER(S): at 05:12

## 2022-03-13 RX ADMIN — PANTOPRAZOLE SODIUM 40 MILLIGRAM(S): 20 TABLET, DELAYED RELEASE ORAL at 08:02

## 2022-03-13 RX ADMIN — Medication 600 MILLIGRAM(S): at 05:12

## 2022-03-13 RX ADMIN — Medication 4 MILLIGRAM(S): at 22:15

## 2022-03-13 RX ADMIN — BUDESONIDE AND FORMOTEROL FUMARATE DIHYDRATE 2 PUFF(S): 160; 4.5 AEROSOL RESPIRATORY (INHALATION) at 22:15

## 2022-03-13 RX ADMIN — Medication 3 MILLILITER(S): at 00:00

## 2022-03-13 RX ADMIN — Medication 3 MILLILITER(S): at 23:17

## 2022-03-13 RX ADMIN — ATORVASTATIN CALCIUM 40 MILLIGRAM(S): 80 TABLET, FILM COATED ORAL at 22:15

## 2022-03-13 RX ADMIN — HEPARIN SODIUM 12 UNIT(S)/HR: 5000 INJECTION INTRAVENOUS; SUBCUTANEOUS at 05:24

## 2022-03-13 RX ADMIN — Medication 3 MILLILITER(S): at 13:02

## 2022-03-13 NOTE — CHART NOTE - NSCHARTNOTEFT_GEN_A_CORE
Notified by RN; pt unable to tolerate PO medications with thin liquids.    77 yo M w/ pmh of HTN, HLD, PVD, R AKA who was brought by EMS to [facility]  after a mechanical fall from his wheelchair found to have CHF exacerbation as well as acute LLE arterial occlusions w/ unsuccessful angiogram and e/o coronary artery disease on stress test now tx for PCI and cardiac clearance for possible LLE BKA    Pt made NPO; previous diet d/c by writer.  SLP Consult ordered, pending.   ; obtain FS q6hrs while pt NPO. EF 35%; consider D5NS 50cc/hr in setting of NPO; for now defer as sugars are optimal and will see SLP in AM. PRN hypoglycemic intervention meds  Endorse to AM TEAM

## 2022-03-13 NOTE — PROGRESS NOTE ADULT - PROBLEM SELECTOR PLAN 1
Found to have LLE CFA and prox SFA occlusions. Arterial angiogram was attempted by Vascular surgery at OSH however it was unsuccessful.  - c/w Heparin GGT and plavix  - Plan for [facility] for risk stratification for possible BKA back at [place]

## 2022-03-13 NOTE — PROGRESS NOTE ADULT - ASSESSMENT
77 yo M w/ pmh of HTN, HLD, PVD s/p R AKA who was brought by EMS to [facility] after a mechanical fall from his wheelchair found to have CHF exacerbation, PNA as well as acute LLE arterial occlusions w/ unsuccessful angiogram. Stress test at OSH was positive, transferred for angiogram for cardiac clearance for possible LLE BKA

## 2022-03-13 NOTE — PROGRESS NOTE ADULT - PROBLEM SELECTOR PLAN 2
+ stress test at OSH  - Plan for [facility] likely today or  monday for risk stratification for L BKA  - Cardiology following

## 2022-03-13 NOTE — PROGRESS NOTE ADULT - SUBJECTIVE AND OBJECTIVE BOX
PROGRESS NOTE:     Patient is a 76y old  Male who presents with a chief complaint of Cardiac clearance for ischemic limb (12 Mar 2022 11:54)      SUBJECTIVE / OVERNIGHT EVENTS: No acute overnight events. Pt states that he feels well. He denies any chest pain, palpitations, shortness of breath lightheadedness, dizziness, or LE pain.     MEDICATIONS  (STANDING):  albuterol/ipratropium for Nebulization 3 milliLiter(s) Nebulizer every 6 hours  atorvastatin 40 milliGRAM(s) Oral at bedtime  budesonide 160 MICROgram(s)/formoterol 4.5 MICROgram(s) Inhaler 2 Puff(s) Inhalation two times a day  carvedilol 12.5 milliGRAM(s) Oral every 12 hours  chlorhexidine 2% Cloths 1 Application(s) Topical <User Schedule>  clopidogrel Tablet 75 milliGRAM(s) Oral daily  doxazosin 4 milliGRAM(s) Oral at bedtime  guaiFENesin  milliGRAM(s) Oral every 12 hours  heparin  Infusion 1200 Unit(s)/Hr (12 mL/Hr) IV Continuous <Continuous>  insulin lispro (ADMELOG) corrective regimen sliding scale   SubCutaneous three times a day before meals  pantoprazole    Tablet 40 milliGRAM(s) Oral before breakfast  polyethylene glycol 3350 17 Gram(s) Oral daily  senna 2 Tablet(s) Oral at bedtime    MEDICATIONS  (PRN):      CAPILLARY BLOOD GLUCOSE      POCT Blood Glucose.: 117 mg/dL (13 Mar 2022 12:47)  POCT Blood Glucose.: 125 mg/dL (13 Mar 2022 08:00)  POCT Blood Glucose.: 120 mg/dL (12 Mar 2022 21:46)  POCT Blood Glucose.: 123 mg/dL (12 Mar 2022 17:09)    I&O's Summary    12 Mar 2022 06:01  -  13 Mar 2022 07:00  --------------------------------------------------------  IN: 584 mL / OUT: 50 mL / NET: 534 mL    13 Mar 2022 07:01  -  13 Mar 2022 15:04  --------------------------------------------------------  IN: 0 mL / OUT: 300 mL / NET: -300 mL        PHYSICAL EXAM:  Vital Signs Last 24 Hrs  T(C): 36.7 (13 Mar 2022 12:30), Max: 36.8 (12 Mar 2022 20:29)  T(F): 98 (13 Mar 2022 12:30), Max: 98.2 (12 Mar 2022 20:29)  HR: 90 (13 Mar 2022 04:00) (75 - 90)  BP: 137/62 (13 Mar 2022 12:30) (137/62 - 144/73)  BP(mean): --  RR: 17 (13 Mar 2022 12:30) (17 - 18)  SpO2: 98% (13 Mar 2022 12:30) (94% - 98%)    Vital Signs Last 24 Hrs  T(C): 36.7 (13 Mar 2022 12:30), Max: 36.8 (12 Mar 2022 20:29)  T(F): 98 (13 Mar 2022 12:30), Max: 98.2 (12 Mar 2022 20:29)  HR: 90 (13 Mar 2022 04:00) (75 - 90)  BP: 137/62 (13 Mar 2022 12:30) (137/62 - 144/73)  BP(mean): --  RR: 17 (13 Mar 2022 12:30) (17 - 18)  SpO2: 98% (13 Mar 2022 12:30) (94% - 98%)    Vital Signs Last 24 Hrs  T(C): 36.7 (13 Mar 2022 12:30), Max: 36.8 (12 Mar 2022 20:29)  T(F): 98 (13 Mar 2022 12:30), Max: 98.2 (12 Mar 2022 20:29)  HR: 90 (13 Mar 2022 04:00) (75 - 90)  BP: 137/62 (13 Mar 2022 12:30) (137/62 - 144/73)  BP(mean): --  RR: 17 (13 Mar 2022 12:30) (17 - 18)  SpO2: 98% (13 Mar 2022 12:30) (94% - 98%)      PHYSICAL EXAM:  GENERAL: NAD  HEAD:  NCAT  ENMT: No external nasal lesions;  oral mucosa with moist membranes  NECK: trachea midline. Supple, No JVD  CHEST/LUNG: Clear to auscultation bilaterally; No wheeze, rales, rhonchi  HEART: S1, S2 Reg rate. No M/R/G.  ABDOMEN: Soft, NT, distended, Bowel sounds present  EXTREMITIES:  Right BKA, LLE with edema. Left foot cool to touch below knee  PSYCH: AAOx3  NEUROLOGY: non-focal      LABS:                        10.4   17.50 )-----------( 254      ( 13 Mar 2022 06:32 )             30.6     03-13    141  |  108  |  58<H>  ----------------------------<  227<H>  3.7   |  13<L>  |  3.20<H>    Ca    8.5      13 Mar 2022 06:32  Phos  6.6     03-13  Mg     1.8     03-13    TPro  6.8  /  Alb  2.7<L>  /  TBili  0.3  /  DBili  x   /  AST  152<H>  /  ALT  53<H>  /  AlkPhos  134<H>  03-13    PT/INR - ( 12 Mar 2022 04:33 )   PT: 16.0 sec;   INR: 1.37 ratio         PTT - ( 13 Mar 2022 13:30 )  PTT:32.3 sec            RADIOLOGY & ADDITIONAL TESTS:  Results Reviewed:   Imaging Personally Reviewed:  Electrocardiogram Personally Reviewed:    COORDINATION OF CARE:  Care Discussed with Consultants/Other Providers [Y/N]:  Prior or Outpatient Records Reviewed [Y/N]:

## 2022-03-14 DIAGNOSIS — Z02.9 ENCOUNTER FOR ADMINISTRATIVE EXAMINATIONS, UNSPECIFIED: ICD-10-CM

## 2022-03-14 DIAGNOSIS — E87.6 HYPOKALEMIA: ICD-10-CM

## 2022-03-14 DIAGNOSIS — N17.9 ACUTE KIDNEY FAILURE, UNSPECIFIED: ICD-10-CM

## 2022-03-14 DIAGNOSIS — D72.829 ELEVATED WHITE BLOOD CELL COUNT, UNSPECIFIED: ICD-10-CM

## 2022-03-14 DIAGNOSIS — E87.0 HYPEROSMOLALITY AND HYPERNATREMIA: ICD-10-CM

## 2022-03-14 DIAGNOSIS — E87.2 ACIDOSIS: ICD-10-CM

## 2022-03-14 DIAGNOSIS — R13.10 DYSPHAGIA, UNSPECIFIED: ICD-10-CM

## 2022-03-14 LAB
ANION GAP SERPL CALC-SCNC: 19 MMOL/L — HIGH (ref 5–17)
APTT BLD: 64.5 SEC — HIGH (ref 27.5–35.5)
BUN SERPL-MCNC: 54 MG/DL — HIGH (ref 7–23)
CALCIUM SERPL-MCNC: 9.1 MG/DL — SIGNIFICANT CHANGE UP (ref 8.4–10.5)
CHLORIDE SERPL-SCNC: 115 MMOL/L — HIGH (ref 96–108)
CO2 SERPL-SCNC: 15 MMOL/L — LOW (ref 22–31)
CREAT SERPL-MCNC: 3.06 MG/DL — HIGH (ref 0.5–1.3)
EGFR: 21 ML/MIN/1.73M2 — LOW
GLUCOSE BLDC GLUCOMTR-MCNC: 103 MG/DL — HIGH (ref 70–99)
GLUCOSE BLDC GLUCOMTR-MCNC: 104 MG/DL — HIGH (ref 70–99)
GLUCOSE BLDC GLUCOMTR-MCNC: 114 MG/DL — HIGH (ref 70–99)
GLUCOSE BLDC GLUCOMTR-MCNC: 115 MG/DL — HIGH (ref 70–99)
GLUCOSE SERPL-MCNC: 93 MG/DL — SIGNIFICANT CHANGE UP (ref 70–99)
HCT VFR BLD CALC: 30.6 % — LOW (ref 39–50)
HGB BLD-MCNC: 10.5 G/DL — LOW (ref 13–17)
MCHC RBC-ENTMCNC: 25.8 PG — LOW (ref 27–34)
MCHC RBC-ENTMCNC: 34.3 GM/DL — SIGNIFICANT CHANGE UP (ref 32–36)
MCV RBC AUTO: 75.2 FL — LOW (ref 80–100)
NRBC # BLD: 0 /100 WBCS — SIGNIFICANT CHANGE UP (ref 0–0)
PLATELET # BLD AUTO: 269 K/UL — SIGNIFICANT CHANGE UP (ref 150–400)
POTASSIUM SERPL-MCNC: 3.3 MMOL/L — LOW (ref 3.5–5.3)
POTASSIUM SERPL-SCNC: 3.3 MMOL/L — LOW (ref 3.5–5.3)
RBC # BLD: 4.07 M/UL — LOW (ref 4.2–5.8)
RBC # FLD: 15.3 % — HIGH (ref 10.3–14.5)
SODIUM SERPL-SCNC: 149 MMOL/L — HIGH (ref 135–145)
WBC # BLD: 21.47 K/UL — HIGH (ref 3.8–10.5)
WBC # FLD AUTO: 21.47 K/UL — HIGH (ref 3.8–10.5)

## 2022-03-14 PROCEDURE — 99223 1ST HOSP IP/OBS HIGH 75: CPT | Mod: GC

## 2022-03-14 PROCEDURE — 99232 SBSQ HOSP IP/OBS MODERATE 35: CPT

## 2022-03-14 PROCEDURE — 71045 X-RAY EXAM CHEST 1 VIEW: CPT | Mod: 26

## 2022-03-14 PROCEDURE — 99233 SBSQ HOSP IP/OBS HIGH 50: CPT

## 2022-03-14 RX ORDER — POTASSIUM CHLORIDE 20 MEQ
20 PACKET (EA) ORAL
Refills: 0 | Status: COMPLETED | OUTPATIENT
Start: 2022-03-14 | End: 2022-03-14

## 2022-03-14 RX ORDER — CHLORHEXIDINE GLUCONATE 213 G/1000ML
1 SOLUTION TOPICAL
Refills: 0 | Status: DISCONTINUED | OUTPATIENT
Start: 2022-03-14 | End: 2022-04-01

## 2022-03-14 RX ADMIN — ATORVASTATIN CALCIUM 40 MILLIGRAM(S): 80 TABLET, FILM COATED ORAL at 21:59

## 2022-03-14 RX ADMIN — SENNA PLUS 2 TABLET(S): 8.6 TABLET ORAL at 21:58

## 2022-03-14 RX ADMIN — Medication 50 MILLIEQUIVALENT(S): at 14:21

## 2022-03-14 RX ADMIN — Medication 4 MILLIGRAM(S): at 22:03

## 2022-03-14 RX ADMIN — Medication 50 MILLIEQUIVALENT(S): at 12:31

## 2022-03-14 RX ADMIN — CHLORHEXIDINE GLUCONATE 1 APPLICATION(S): 213 SOLUTION TOPICAL at 07:41

## 2022-03-14 RX ADMIN — Medication 600 MILLIGRAM(S): at 17:28

## 2022-03-14 RX ADMIN — Medication 3 MILLILITER(S): at 17:29

## 2022-03-14 RX ADMIN — Medication 50 MILLIEQUIVALENT(S): at 10:10

## 2022-03-14 RX ADMIN — BUDESONIDE AND FORMOTEROL FUMARATE DIHYDRATE 2 PUFF(S): 160; 4.5 AEROSOL RESPIRATORY (INHALATION) at 21:57

## 2022-03-14 RX ADMIN — CLOPIDOGREL BISULFATE 75 MILLIGRAM(S): 75 TABLET, FILM COATED ORAL at 12:31

## 2022-03-14 RX ADMIN — Medication 3 MILLILITER(S): at 12:30

## 2022-03-14 RX ADMIN — Medication 3 MILLILITER(S): at 06:19

## 2022-03-14 RX ADMIN — BUDESONIDE AND FORMOTEROL FUMARATE DIHYDRATE 2 PUFF(S): 160; 4.5 AEROSOL RESPIRATORY (INHALATION) at 10:10

## 2022-03-14 RX ADMIN — POLYETHYLENE GLYCOL 3350 17 GRAM(S): 17 POWDER, FOR SOLUTION ORAL at 12:30

## 2022-03-14 RX ADMIN — CARVEDILOL PHOSPHATE 12.5 MILLIGRAM(S): 80 CAPSULE, EXTENDED RELEASE ORAL at 17:28

## 2022-03-14 NOTE — PROGRESS NOTE ADULT - ASSESSMENT
76M w/ pmh of HTN, HLD, PVD s/p R AKA who was brought by EMS to [facility] after a mechanical fall from his wheelchair found to have CHF exacerbation, PNA as well as acute LLE arterial occlusions w/ unsuccessful angiogram. Stress test at OSH was positive, transferred for angiogram for cardiac clearance for possible LLE BKA

## 2022-03-14 NOTE — CONSULT NOTE ADULT - ATTENDING COMMENTS
BAUDILIO on CKD:   Baseline creatinine not known  renal function improving   Now scheduled for cardiac cath in am  Also noted to be hypernatremic and hypokalemic  Would ideally wait for creatinine to be lower before scheduling a cath, however if its deemed necessary would give D5W with 2 amps of sodium bicarbonate at 50 cc/hr for 6 hours. Would repelete with potassium chloride PO 40 meq  Check renal sono  Check U/A     Rest per Dr. Aguilar Bourgeois MD  O: [phone]  C: [phone]

## 2022-03-14 NOTE — CONSULT NOTE ADULT - PROBLEM SELECTOR RECOMMENDATION 9
BAUDILIO vs CKD vs BAUDILIO on CKD  Baseline unknown.  No prior labs on [facility].   SCr 3.06 on 3/11/22-->3.07-->3.2-->today 3.03.   Send UA, urine electrolytes, spot urine TP/CR.   Check Renal US. Now has a [facility].   Will need to consider HD if renal failure continues to worsen. Monitor labs and urine output. Avoid NSAIDs, ACEI/ARBS, RCA and nephrotoxins. Dose medications as per eGFR.    Due to pt's preexisting CKD & now BAUDILIO on CKD, pt has a Stevan's score of 17 which imparts a 57% risk of developing post-PCI JULIO & 12.6% risk of post-PCI JULIO requiring dialysis. Risks explained to the patient in details. Continue to ACE, ARB, diuretics prior to cath. Pt appears euvolemic on exam. Please use minimal contrast during angio. Start normal saline 1 cc/kg/hr 4 hours pre & post procedure to minimize the risk of JULIO. Avoid BP fluctuations

## 2022-03-14 NOTE — SWALLOW BEDSIDE ASSESSMENT ADULT - SWALLOW EVAL: RECOMMENDED DIET
1) Puree texture diet 2) 1:1 assist and supervision with all PO intake 3) Crush medications and place in applesauce 4) Pt must remain upright for duration of meal

## 2022-03-14 NOTE — SWALLOW BEDSIDE ASSESSMENT ADULT - COMMENTS
- On 2 L NC, + cough  - Completed course of PNA    t made NPO; previous diet d/c by writer.  SLP Consult ordered, pending.   ; obtain FS q6hrs while pt NPO. EF 35%; consider D5NS 50cc/hr in setting of NPO; for now defer as sugars are optimal and will see SLP in AM. PRN hypoglycemic intervention meds  Endorse to AM TEAM.    Cards consulted for clearance for ischemic limb.  Seek IV diuresis. Coronary angiography planned tomorrow, has advanced CKD and needs optimization.    S&S consulted 2/2 difficulty swallowing meds.

## 2022-03-14 NOTE — SWALLOW BEDSIDE ASSESSMENT ADULT - SWALLOW EVAL: DIAGNOSIS
75 yo M w/ pmh of HTN, HLD, PVD, R AKA who was brought by EMS to [facility]  after a mechanical fall from his wheelchair found to have CHF exacerbation as well as acute LLE arterial occlusions w/ unsuccessful angiogram and e/o coronary artery disease on stress test now tx for PCI and cardiac clearance for possible LLE MARGUERITE 77 yo M w/ pmh of HTN, HLD, PVD, R AKA who was brought by EMS to [facility]  after a mechanical fall from his wheelchair found to have CHF exacerbation as well as acute LLE arterial occlusions w/ unsuccessful angiogram and e/o coronary artery disease on stress test now tx for PCI and cardiac clearance for possible LLE BKA. Pt presents with clinical signs of an oropharyngeal dysphagia, possibly negatively impacted by suspected ?cognitive deficits/AMS. Swallow sequence is marked by delayed oral transit time vs. delay in swallow initiation. Fair laryngeal elevation upon palpation. No overt signs or symptoms of penetration or aspiration on purees and thin liquids via cued small cup sips. Pt edentulous and reports that his daughter plans to bring U/L dentures to hospital. Would suggest remaining on puree texture diet with re-assessment by this service to asses candidacy for advancement of solids.

## 2022-03-14 NOTE — SWALLOW BEDSIDE ASSESSMENT ADULT - SLP PERTINENT HISTORY OF CURRENT PROBLEM
75 yo M w/ pmh of HTN, HLD, PVD, R AKA who was brought by EMS to [facility]  after a mechanical fall from his wheelchair at home. NO LOC or head injury and he denies dizziness syncope. He was admitted to [facility] for sepsis due to PNA w/ troponemia 2/2 to demand on 3/6/2022. He was also found to be in CHF as well as a LE arterial embolism. Pt received Azithromycin and Ceftriaxone for a B/L PNA seen on CT Chest. Hypoxic resp failure improved by day 2 of ICU stay. On 3/8 pt begin to complain of severe LLE pain and found to have LLE CFA and prox SFA occlusions.  Pt was started on a heparin gtt and an arterial angiogram was attempted by Vascular surgery however it was unsuccessful. During the aortogram he became hypertensive with RAFIQ and decision was made to abort the case. BKA was recommended. Pt was noted to have elevated CKs and managed for rhabdo. Pre-op stress testing for BKA revealed multiple ischemic regions. Pt was transferred to [facility] for cardiac cath.

## 2022-03-14 NOTE — PROGRESS NOTE ADULT - SUBJECTIVE AND OBJECTIVE BOX
Ra Seo MD    Patient is a 76y old  Male who presents with a chief complaint of Cardiac clearnace for ischemic limb (14 Mar 2022 06:42)        SUBJECTIVE / OVERNIGHT EVENTS: C/o LLE pain. Denies CP/SOB  TELEMETRY: SR/ST       MEDICATIONS  (STANDING):  albuterol/ipratropium for Nebulization 3 milliLiter(s) Nebulizer every 6 hours  atorvastatin 40 milliGRAM(s) Oral at bedtime  budesonide 160 MICROgram(s)/formoterol 4.5 MICROgram(s) Inhaler 2 Puff(s) Inhalation two times a day  carvedilol 12.5 milliGRAM(s) Oral every 12 hours  chlorhexidine 2% Cloths 1 Application(s) Topical <User Schedule>  clopidogrel Tablet 75 milliGRAM(s) Oral daily  doxazosin 4 milliGRAM(s) Oral at bedtime  guaiFENesin  milliGRAM(s) Oral every 12 hours  heparin  Infusion 1300 Unit(s)/Hr (13 mL/Hr) IV Continuous <Continuous>  insulin lispro (ADMELOG) corrective regimen sliding scale   SubCutaneous three times a day before meals  pantoprazole    Tablet 40 milliGRAM(s) Oral before breakfast  polyethylene glycol 3350 17 Gram(s) Oral daily  potassium chloride  20 mEq/100 mL IVPB 20 milliEquivalent(s) IV Intermittent every 2 hours  senna 2 Tablet(s) Oral at bedtime    MEDICATIONS  (PRN):      Vital Signs Last 24 Hrs  T(C): 36.8 (14 Mar 2022 05:26), Max: 36.8 (13 Mar 2022 22:19)  T(F): 98.3 (14 Mar 2022 05:26), Max: 98.3 (14 Mar 2022 05:26)  HR: 106 (14 Mar 2022 05:26) (99 - 106)  BP: 134/70 (14 Mar 2022 05:26) (109/60 - 137/62)  BP(mean): --  RR: 18 (14 Mar 2022 05:26) (17 - 18)  SpO2: 94% (14 Mar 2022 05:26) (94% - 98%)  CAPILLARY BLOOD GLUCOSE      POCT Blood Glucose.: 115 mg/dL (14 Mar 2022 07:47)  POCT Blood Glucose.: 112 mg/dL (13 Mar 2022 21:45)  POCT Blood Glucose.: 111 mg/dL (13 Mar 2022 17:36)  POCT Blood Glucose.: 117 mg/dL (13 Mar 2022 12:47)    I&O's Summary    13 Mar 2022 07:01  -  14 Mar 2022 07:00  --------------------------------------------------------  IN: 0 mL / OUT: 450 mL / NET: -450 mL          PHYSICAL EXAM  GENERAL: NAD, well-developed  HEAD:  Atraumatic, normocephalic  EYES: EOMI, PERRLA, conjunctiva and sclera clear  CHEST/LUNG: Clear to auscultation bilaterally; no wheezes  HEART: +S1+S2, regular rate and rhythm  ABDOMEN: Soft, nontender, nondistended; bowel sounds present  EXTREMITIES:  R AKA; LLE cold to touch below knee, + edema  PSYCH: AAOx3      LABS:                        10.5   21.47 )-----------( 269      ( 14 Mar 2022 06:54 )             30.6     03-14    149<H>  |  115<H>  |  54<H>  ----------------------------<  93  3.3<L>   |  15<L>  |  3.06<H>    Ca    9.1      14 Mar 2022 06:54  Phos  6.6     03-13  Mg     1.8     03-13    TPro  6.8  /  Alb  2.7<L>  /  TBili  0.3  /  DBili  x   /  AST  152<H>  /  ALT  53<H>  /  AlkPhos  134<H>  03-13    PTT - ( 14 Mar 2022 06:54 )  PTT:64.5 sec            RADIOLOGY & ADDITIONAL TESTS:    Imaging Personally Reviewed:  Consultant(s) Notes Reviewed:    Care Discussed with Consultants/Other Providers:

## 2022-03-14 NOTE — CONSULT NOTE ADULT - SUBJECTIVE AND OBJECTIVE BOX
[facility] DIVISION OF KIDNEY DISEASES AND HYPERTENSION -- [phone]  -- INITIAL CONSULT NOTE  --------------------------------------------------------------------------------  HPI: 76 year old man with HTN, PVD, R AKA brought by EMS to [facility] after a mechanical fall from his wheelchair found exacerbation of heart failure, reduced EF 45%, apparent acute on chronic systolic heart failure and has acute LLE arterial occlusions and attempted peripheral angiogram was unsuccessful. Patient was evaluated for LLE BKA and had abnormal nuclear stress test, thus transferred for cardiac catheterization and evaluation prior to planned BKA. Maintained on heparin infusion, Vascular surgery evaluating. Seek IV diuresis. Coronary angiography planned tomorrow, has advanced CKD and needs optimization.  Nephrology called for CKD management. Baseline unknown.  SCr 3.06 on 3/11/22-->3.07-->3.2-->today 3.03. Home meds not listed. Has not been on diuretics since admission.               Patient seen & examined. Denies chest pain, fever, chills, increased frequency, dysuria, hematuria, pus in urine, frothy urine, SOB, leg edema, loss of appetite, pruritis, N/V.      PAST HISTORY  --------------------------------------------------------------------------------  PAST MEDICAL & SURGICAL HISTORY:  HTN (hypertension)    Hyperlipidemia    PVD (peripheral vascular disease)    S/P BKA (below knee amputation), right      FAMILY HISTORY:    PAST SOCIAL HISTORY:    ALLERGIES & MEDICATIONS  --------------------------------------------------------------------------------  Allergies    No Known Allergies    Intolerances      Standing Inpatient Medications  albuterol/ipratropium for Nebulization 3 milliLiter(s) Nebulizer every 6 hours  atorvastatin 40 milliGRAM(s) Oral at bedtime  budesonide 160 MICROgram(s)/formoterol 4.5 MICROgram(s) Inhaler 2 Puff(s) Inhalation two times a day  carvedilol 12.5 milliGRAM(s) Oral every 12 hours  chlorhexidine 2% Cloths 1 Application(s) Topical <User Schedule>  clopidogrel Tablet 75 milliGRAM(s) Oral daily  doxazosin 4 milliGRAM(s) Oral at bedtime  guaiFENesin  milliGRAM(s) Oral every 12 hours  heparin  Infusion 1300 Unit(s)/Hr IV Continuous <Continuous>  insulin lispro (ADMELOG) corrective regimen sliding scale   SubCutaneous three times a day before meals  pantoprazole    Tablet 40 milliGRAM(s) Oral before breakfast  polyethylene glycol 3350 17 Gram(s) Oral daily  senna 2 Tablet(s) Oral at bedtime    PRN Inpatient Medications      REVIEW OF SYSTEMS  --------------------------------------------------------------------------------  Gen: No  fevers/chills  Respiratory: No dyspnea, cough  CV: No chest pain  GI: No abdominal pain, diarrhea,  nausea, vomiting  : No increased frequency, dysuria, hematuria  MSK:  no edema  Neuro: No dizziness/lightheadedness    All other systems were reviewed and are negative, except as noted.    VITALS/PHYSICAL EXAM  --------------------------------------------------------------------------------  T(C): 36.7 (03-14-22 @ 11:38), Max: 36.8 (03-13-22 @ 22:19)  HR: 94 (03-14-22 @ 11:38) (94 - 106)  BP: 125/64 (03-14-22 @ 11:38) (109/60 - 134/70)  RR: 18 (03-14-22 @ 11:38) (18 - 18)  SpO2: 96% (03-14-22 @ 11:38) (94% - 96%)  Wt(kg): --        03-13-22 @ 07:01  -  03-14-22 @ 07:00  --------------------------------------------------------  IN: 0 mL / OUT: 450 mL / NET: -450 mL    03-14-22 @ 07:01  -  03-14-22 @ 15:47  --------------------------------------------------------  IN: 104 mL / OUT: 0 mL / NET: 104 mL      Physical Exam:  	Gen: NAD  	HEENT: MMM  	Pulm: CTA B/L  	CV: S1S2  	Abd: Soft, +BS   	Ext: No LE edema B/L, no asterixis  	Neuro: Awake, alert  	Skin: Warm and dry  	Vascular access:    LABS/STUDIES  --------------------------------------------------------------------------------              10.5   21.47 >-----------<  269      [03-14-22 @ 06:54]              30.6     149  |  115  |  54  ----------------------------<  93      [03-14-22 @ 06:54]  3.3   |  15  |  3.06        Ca     9.1     [03-14-22 @ 06:54]      Mg     1.8     [03-13-22 @ 06:32]      Phos  6.6     [03-13-22 @ 06:32]    TPro  6.8  /  Alb  2.7  /  TBili  0.3  /  DBili  x   /  AST  152  /  ALT  53  /  AlkPhos  134  [03-13-22 @ 06:32]      PTT: 64.5       [03-14-22 @ 06:54]      Creatinine Trend:  SCr 3.06 [03-14 @ 06:54]  SCr 3.20 [03-13 @ 06:32]  SCr 3.78 [03-12 @ 04:33]  SCr 3.92 [03-11 @ 17:53]        TSH 1.60      [03-12-22 @ 00:38]  Lipid: chol 106, TG 95, HDL 33, LDL --      [03-12-22 @ 00:38]    HCV 0.16, Nonreact      [03-12-22 @ 00:43]     [facility] DIVISION OF KIDNEY DISEASES AND HYPERTENSION -- [phone]  -- INITIAL CONSULT NOTE  --------------------------------------------------------------------------------  HPI: 76 year old man with HTN, PVD, R AKA brought by EMS to  after a mechanical fall from his wheelchair found exacerbation of heart failure, reduced EF 45%, apparent acute on chronic systolic heart failure and has acute LLE arterial occlusions and attempted peripheral angiogram was unsuccessful. Patient was evaluated for LLE BKA and had abnormal nuclear stress test, thus transferred for cardiac catheterization and evaluation prior to planned BKA. Maintained on heparin infusion, Vascular surgery evaluating. Seek IV diuresis. Coronary angiography planned tomorrow, has advanced CKD and needs optimization. Nephrology called for CKD management. Baseline unknown.  SCr 3.06 on 3/11/22-->3.07-->3.2-->today 3.03. Home meds not listed. Has not been on diuretics since admission. As per the pt he has has no know history of kidney disease. No NSAID use. Denies having diabetes.            PAST HISTORY  --------------------------------------------------------------------------------  PAST MEDICAL & SURGICAL HISTORY:  HTN (hypertension)    Hyperlipidemia    PVD (peripheral vascular disease)    S/P BKA (below knee amputation), right      FAMILY HISTORY:    PAST SOCIAL HISTORY:    ALLERGIES & MEDICATIONS  --------------------------------------------------------------------------------  Allergies    No Known Allergies    Intolerances      Standing Inpatient Medications  albuterol/ipratropium for Nebulization 3 milliLiter(s) Nebulizer every 6 hours  atorvastatin 40 milliGRAM(s) Oral at bedtime  budesonide 160 MICROgram(s)/formoterol 4.5 MICROgram(s) Inhaler 2 Puff(s) Inhalation two times a day  carvedilol 12.5 milliGRAM(s) Oral every 12 hours  chlorhexidine 2% Cloths 1 Application(s) Topical <User Schedule>  clopidogrel Tablet 75 milliGRAM(s) Oral daily  doxazosin 4 milliGRAM(s) Oral at bedtime  guaiFENesin  milliGRAM(s) Oral every 12 hours  heparin  Infusion 1300 Unit(s)/Hr IV Continuous <Continuous>  insulin lispro (ADMELOG) corrective regimen sliding scale   SubCutaneous three times a day before meals  pantoprazole    Tablet 40 milliGRAM(s) Oral before breakfast  polyethylene glycol 3350 17 Gram(s) Oral daily  senna 2 Tablet(s) Oral at bedtime    PRN Inpatient Medications      REVIEW OF SYSTEMS  --------------------------------------------------------------------------------  Gen: No  fevers/chills  Respiratory: No dyspnea, cough  CV: No chest pain  GI: No abdominal pain, diarrhea,  nausea, vomiting  : No increased frequency, dysuria, hematuria  MSK:  no edema  Neuro: No dizziness/lightheadedness    All other systems were reviewed and are negative, except as noted.    VITALS/PHYSICAL EXAM  --------------------------------------------------------------------------------  T(C): 36.7 (03-14-22 @ 11:38), Max: 36.8 (03-13-22 @ 22:19)  HR: 94 (03-14-22 @ 11:38) (94 - 106)  BP: 125/64 (03-14-22 @ 11:38) (109/60 - 134/70)  RR: 18 (03-14-22 @ 11:38) (18 - 18)  SpO2: 96% (03-14-22 @ 11:38) (94% - 96%)  Wt(kg): --        03-13-22 @ 07:01  -  03-14-22 @ 07:00  --------------------------------------------------------  IN: 0 mL / OUT: 450 mL / NET: -450 mL    03-14-22 @ 07:01  -  03-14-22 @ 15:47  --------------------------------------------------------  IN: 104 mL / OUT: 0 mL / NET: 104 mL      Physical Exam:  	Gen: NAD  	HEENT: MMM  	Pulm: CTA B/L  	CV: S1S2  	Abd: Soft, +BS   	Ext: R AKA, L foot gangrene  	Neuro: Awake, alert  	Skin: Warm and dry  	Vascular access:             +romel with clear urine    LABS/STUDIES  --------------------------------------------------------------------------------              10.5   21.47 >-----------<  269      [03-14-22 @ 06:54]              30.6     149  |  115  |  54  ----------------------------<  93      [03-14-22 @ 06:54]  3.3   |  15  |  3.06        Ca     9.1     [03-14-22 @ 06:54]      Mg     1.8     [03-13-22 @ 06:32]      Phos  6.6     [03-13-22 @ 06:32]    TPro  6.8  /  Alb  2.7  /  TBili  0.3  /  DBili  x   /  AST  152  /  ALT  53  /  AlkPhos  134  [03-13-22 @ 06:32]      PTT: 64.5       [03-14-22 @ 06:54]      Creatinine Trend:  SCr 3.06 [03-14 @ 06:54]  SCr 3.20 [03-13 @ 06:32]  SCr 3.78 [03-12 @ 04:33]  SCr 3.92 [03-11 @ 17:53]        TSH 1.60      [03-12-22 @ 00:38]  Lipid: chol 106, TG 95, HDL 33, LDL --      [03-12-22 @ 00:38]    HCV 0.16, Nonreact      [03-12-22 @ 00:43]

## 2022-03-14 NOTE — CONSULT NOTE ADULT - SUBJECTIVE AND OBJECTIVE BOX
Wound Surgery Consult Note:    HPI:  77 yo M w/ pmh of HTN, HLD, PVD, R AKA who was brought by EMS to [facility]  after a mechanical fall from his wheelchair at home. NO LOC or head injury and he denies dizziness syncope. He was admitted to [facility] for sepsis due to PNA w/ troponemia 2/2 to demand on 3/6/2022. He was also found to be in CHF as well as a LE arterial embolism. Pt received Azithromycin and Ceftriaxone for a B/L PNA seen on CT Chest. Hypoxic resp failure improved by day 2 of ICU stay. On 3/8 pt begin to complain of severe LLE pain and found to have LLE CFA and prox SFA occlusions.  Pt was started on a heparin gtt and an arterial angiogram was attempted by Vascular surgery however it was unsuccessful. During the aortogram he became hypertensive with RAFIQ and decision was made to abort the case. BKA was recommended. Pt was noted to have elevated CKs and managed for rhabdo. Pre-op stress testing for BKA revealed multiple ischemic regions. Pt was transferred to [facility] for cardiac cath.   (11 Mar 2022 16:57)    Request for wound care consult for sacral/bilateral buttocks skin breakdown received from nursing. Mr. Terry was encountered on an alternating air with low air loss surface. He is grossly incontinent of stool and urine. He was able turn and reposition self in bed although he is weak. His extreme immobility, inactivity, gross incontinence of urine and stool as well as poor nutritional status all contribute to his high risk for pressure injury development and hinder healing.    PAST MEDICAL & SURGICAL HISTORY:  HTN (hypertension)  Hyperlipidemia  PVD (peripheral vascular disease)  S/P BKA (below knee amputation), right    MEDICATIONS  (STANDING):  albuterol/ipratropium for Nebulization 3 milliLiter(s) Nebulizer every 6 hours  atorvastatin 40 milliGRAM(s) Oral at bedtime  budesonide 160 MICROgram(s)/formoterol 4.5 MICROgram(s) Inhaler 2 Puff(s) Inhalation two times a day  carvedilol 12.5 milliGRAM(s) Oral every 12 hours  chlorhexidine 2% Cloths 1 Application(s) Topical <User Schedule>  clopidogrel Tablet 75 milliGRAM(s) Oral daily  doxazosin 4 milliGRAM(s) Oral at bedtime  guaiFENesin  milliGRAM(s) Oral every 12 hours  heparin  Infusion 1300 Unit(s)/Hr (13 mL/Hr) IV Continuous <Continuous>  insulin lispro (ADMELOG) corrective regimen sliding scale   SubCutaneous three times a day before meals  pantoprazole    Tablet 40 milliGRAM(s) Oral before breakfast  polyethylene glycol 3350 17 Gram(s) Oral daily  potassium chloride  20 mEq/100 mL IVPB 20 milliEquivalent(s) IV Intermittent every 2 hours  senna 2 Tablet(s) Oral at bedtime    MEDICATIONS  (PRN):    Allergies    No Known Allergies    Intolerances    Vital Signs Last 24 Hrs  T(C): 36.7 (14 Mar 2022 11:38), Max: 36.8 (13 Mar 2022 22:19)  T(F): 98 (14 Mar 2022 11:38), Max: 98.3 (14 Mar 2022 05:26)  HR: 94 (14 Mar 2022 11:38) (94 - 106)  BP: 125/64 (14 Mar 2022 11:38) (109/60 - 137/62)  BP(mean): --  RR: 18 (14 Mar 2022 11:38) (17 - 18)  SpO2: 96% (14 Mar 2022 11:38) (94% - 98%)    Physical Exam:  General: Alert, weak, frail  Respiratory: no SOB on room air  Gastrointestinal: soft NT/ND  Neurology: weakened strength & sensation grossly intact  Musculoskeletal: R AKA  Vascular: no LLE edema  Skin:  Sacral/bilateral buttocks with central area of superficially denuded skin L 3cm X W 2cm xD 0.1cm with pink wound bed, no necrotic tissue, and scant drainage, posterior scrotum - with superficially denuded skin L 2.5cm x W 2.5cm x d 0.1cm pink wound bed, no necrotic tissue, scant drainage  No odor, erythema, increased warmth, tenderness, induration, fluctuance    LABS:  03-14    149<H>  |  115<H>  |  54<H>  ----------------------------<  93  3.3<L>   |  15<L>  |  3.06<H>    Ca    9.1      14 Mar 2022 06:54  Phos  6.6     03-13  Mg     1.8     03-13    TPro  6.8  /  Alb  2.7<L>  /  TBili  0.3  /  DBili  x   /  AST  152<H>  /  ALT  53<H>  /  AlkPhos  134<H>  03-13                          10.5   21.47 )-----------( 269      ( 14 Mar 2022 06:54 )             30.6     PTT - ( 14 Mar 2022 06:54 )  PTT:64.5 sec

## 2022-03-14 NOTE — PROGRESS NOTE ADULT - ASSESSMENT
76 year old man with HTN, PVD, R AKA brought by EMS to [facility] after a mechanical fall from his wheelchair found exacerbation of heart failure, reduced EF 45%, apparent acute on chronic systolic heart failure and has acute LLE arterial occlusions and attempted peripheral angiogram was unsuccessful. Patient was evaluated for LLE BKA and had abnormal nuclear stress test, thus transferred for cardiac catheterization and evaluation prior to planned BKA. Maintained on heparin infusion, Vascular surgery evaluating. Seek IV diuresis. Coronary angiography planned tomorrow, has advanced CKD and needs optimization.    TTE 3/12- EF 35%, mild MR, moderate segmental LV dysfunction. IL, basal inferior, basal septal are akinetic. AL and apical walls are hypokinetic.   - Continue coreg 12.5 mg bid   - Continue heparin gtt for CFA and SFA occlusions  - agree with plavix  - high intensity statin  - L CFA and SFA occlusions w unsuccessful angio. Recommending L BKA possibly AKA per vascular  - vascular aware, unclear if will return to [facility] for surgery, appreciate recs  - further risk stratification pending [facility]      76 year old man with HTN, PVD, R AKA brought by EMS to [facility] after a mechanical fall from his wheelchair found exacerbation of heart failure, reduced EF 45%, apparent acute on chronic systolic heart failure and has acute LLE arterial occlusions and attempted peripheral angiogram was unsuccessful. Patient was evaluated for LLE BKA and had abnormal nuclear stress test, thus transferred for cardiac catheterization and evaluation prior to planned BKA. Maintained on heparin infusion, Vascular surgery evaluating. Seek IV diuresis. Coronary angiography planned tomorrow, has advanced CKD and needs optimization.    TTE 3/12- EF 35%, mild MR, moderate segmental LV dysfunction. IL, basal inferior, basal septal are akinetic. AL and apical walls are hypokinetic.   - Continue coreg 12.5 mg bid   - Continue heparin gtt for CFA and SFA occlusions  - agree with plavix  - high intensity statin  - L CFA and SFA occlusions w unsuccessful angio. Recommending L BKA possibly AKA per vascular  - vascular aware, unclear if will return to [facility] for surgery, appreciate recs  -please obtain renal recommendations given CKD and plans for [facility] tomorrow  - further risk stratification pending [facility]

## 2022-03-14 NOTE — SWALLOW BEDSIDE ASSESSMENT ADULT - ADDITIONAL RECOMMENDATIONS
Monitor for s/s aspiration/laryngeal penetration. If noted:  D/C p.o. intake, provide non-oral nutrition/hydration/meds, and contact this service @ [phone]  Maintain good oral hygiene    LTG: Pt will tolerate least restrictive diet without overt signs or symptoms of penetration or aspiration

## 2022-03-14 NOTE — CONSULT NOTE ADULT - ASSESSMENT
Impression:    Sacral/bilateral Buttocks stage 2 pressure injury present on admission  Incontinence of bowel and bladder  Incontinence Dermatitis    Recommend:  1.) topical therapy: sacral/buttock injury - cleanse with incontinence cleanser, pat dry, apply Triad ointment twice daily and PRN for incontinent episodes  2.) Incontinence Management - incontinence cleanser, pads, pericare BID, avoid diapers  3.) Maintain on an alternating air with low air loss surface  4.) Turn and reposition Q 2 hours  5.) Nutrition optimization  6.) Offload heels/feet with complete cair air fluidized boots; ensure that the soles of the feet are not resting on the foot board of the bed.    Care as per medicine. Will not actively follow but will remain available. Please recall for new issues of deterioration.  Upon discharge [facility] [address] [place] [address] [phone]  Discussed with clinical nurse  Thank you for this consult  Hannah Iyer, NP-C, CWOCN [id number]

## 2022-03-14 NOTE — CONSULT NOTE ADULT - ASSESSMENT
76 year old man with HTN, PVD, R AKA brought by EMS to [facility] after a mechanical fall from his wheelchair found exacerbation of heart failure, reduced EF 45%, apparent acute on chronic systolic heart failure and has acute LLE arterial occlusions and attempted peripheral angiogram was unsuccessful. Patient was evaluated for LLE BKA and had abnormal nuclear stress test, thus transferred for cardiac catheterization and evaluation prior to planned BKA. Maintained on heparin infusion, Vascular surgery evaluating. Seek IV diuresis. Coronary angiography planned tomorrow, has advanced CKD and needs optimization. Nephrology called for CKD management. Baseline unknown.  SCr 3.06 on 3/11/22-->3.07-->3.2-->today 3.03. Home meds not listed. Has not been on diuretics since admission.

## 2022-03-14 NOTE — PROGRESS NOTE ADULT - SUBJECTIVE AND OBJECTIVE BOX
Patient seen and examined at bedside.    Overnight Events: No acute events overnight      REVIEW OF SYSTEMS:  Constitutional:     [ ] negative [ ] fevers [ ] chills [ ] weight loss [ ] weight gain  HEENT:                  [ ] negative [ ] dry eyes [ ] eye irritation [ ] postnasal drip [ ] nasal congestion  CV:                         [ ] negative  [ ] chest pain [ ] orthopnea [ ] palpitations [ ] murmur  Resp:                     [ ] negative [ ] cough [ ] shortness of breath [ ] dyspnea [ ] wheezing [ ] sputum [ ]hemoptysis  GI:                          [ ] negative [ ] nausea [ ] vomiting [ ] diarrhea [ ] constipation [ ] abd pain [ ] dysphagia   :                        [ ] negative [ ] dysuria [ ] nocturia [ ] hematuria [ ] increased urinary frequency  Musculoskeletal: [ ] negative [ ] back pain [ ] myalgias [ ] arthralgias [ ] fracture  Skin:                       [ ] negative [ ] rash [ ] itch  Neurological:        [ ] negative [ ] headache [ ] dizziness [ ] syncope [ ] weakness [ ] numbness  Psychiatric:           [ ] negative [ ] anxiety [ ] depression  Endocrine:            [ ] negative [ ] diabetes [ ] thyroid problem  Heme/Lymph:      [ ] negative [ ] anemia [ ] bleeding problem  Allergic/Immune: [ ] negative [ ] itchy eyes [ ] nasal discharge [ ] hives [ ] angioedema    [x ] All other systems negative  [ ] Unable to assess ROS due to    Current Meds:  albuterol/ipratropium for Nebulization 3 milliLiter(s) Nebulizer every 6 hours  atorvastatin 40 milliGRAM(s) Oral at bedtime  budesonide 160 MICROgram(s)/formoterol 4.5 MICROgram(s) Inhaler 2 Puff(s) Inhalation two times a day  carvedilol 12.5 milliGRAM(s) Oral every 12 hours  chlorhexidine 2% Cloths 1 Application(s) Topical <User Schedule>  clopidogrel Tablet 75 milliGRAM(s) Oral daily  doxazosin 4 milliGRAM(s) Oral at bedtime  guaiFENesin  milliGRAM(s) Oral every 12 hours  heparin  Infusion 1300 Unit(s)/Hr IV Continuous <Continuous>  insulin lispro (ADMELOG) corrective regimen sliding scale   SubCutaneous three times a day before meals  pantoprazole    Tablet 40 milliGRAM(s) Oral before breakfast  polyethylene glycol 3350 17 Gram(s) Oral daily  senna 2 Tablet(s) Oral at bedtime      PAST MEDICAL & SURGICAL HISTORY:  HTN (hypertension)    Hyperlipidemia    PVD (peripheral vascular disease)    S/P BKA (below knee amputation), right        Vitals:  T(F): 98.3 (03-14), Max: 98.3 (03-14)  HR: 106 (03-14) (99 - 106)  BP: 134/70 (03-14) (109/60 - 137/62)  RR: 18 (03-14)  SpO2: 94% (03-14)  I&O's Summary    12 Mar 2022 06:01  -  13 Mar 2022 07:00  --------------------------------------------------------  IN: 584 mL / OUT: 50 mL / NET: 534 mL    13 Mar 2022 07:01  -  14 Mar 2022 06:43  --------------------------------------------------------  IN: 0 mL / OUT: 450 mL / NET: -450 mL    Physical Exam:  Appearance: No acute distress; well appearing  Eyes: PERRL, EOMI, pink conjunctiva  HEENT: Normal oral mucosa  Cardiovascular: RRR, S1, S2, no murmurs, rubs, or gallops; no edema; no JVD; s/p RLE amputation, LLE cool without palpable pulses   Respiratory: Clear to auscultation bilaterally  Gastrointestinal: soft, non-tender, non-distended with normal bowel sounds  Musculoskeletal: No clubbing; no joint deformity   Neurologic: Non-focal  Lymphatic: No lymphadenopathy  Psychiatry: AAOx3, mood & affect appropriate  Skin: No rashes, ecchymoses, or cyanosis                            10.4   17.50 )-----------( 254      ( 13 Mar 2022 06:32 )             30.6     03-13    141  |  108  |  58<H>  ----------------------------<  227<H>  3.7   |  13<L>  |  3.20<H>    Ca    8.5      13 Mar 2022 06:32  Phos  6.6     03-13  Mg     1.8     03-13    TPro  6.8  /  Alb  2.7<L>  /  TBili  0.3  /  DBili  x   /  AST  152<H>  /  ALT  53<H>  /  AlkPhos  134<H>  03-13    PTT - ( 13 Mar 2022 23:01 )  PTT:61.5 sec      Serum Pro-Brain Natriuretic Peptide: 5764 pg/mL (03-11 @ 17:53)         Patient seen and examined at bedside.    Overnight Events: No acute events overnight    REVIEW OF SYSTEMS:  Constitutional:     [ ] negative [ ] fevers [ ] chills [ ] weight loss [ ] weight gain  HEENT:                  [ ] negative [ ] dry eyes [ ] eye irritation [ ] postnasal drip [ ] nasal congestion  CV:                         [ ] negative  [ ] chest pain [ ] orthopnea [ ] palpitations [ ] murmur  Resp:                     [ ] negative [ ] cough [ ] shortness of breath [ ] dyspnea [ ] wheezing [ ] sputum [ ]hemoptysis  GI:                          [ ] negative [ ] nausea [ ] vomiting [ ] diarrhea [ ] constipation [ ] abd pain [ ] dysphagia   :                        [ ] negative [ ] dysuria [ ] nocturia [ ] hematuria [ ] increased urinary frequency  Musculoskeletal: [ ] negative [ ] back pain [ ] myalgias [ ] arthralgias [ ] fracture  Skin:                       [ ] negative [ ] rash [ ] itch  Neurological:        [ ] negative [ ] headache [ ] dizziness [ ] syncope [ ] weakness [ ] numbness  Psychiatric:           [ ] negative [ ] anxiety [ ] depression  Endocrine:            [ ] negative [ ] diabetes [ ] thyroid problem  Heme/Lymph:      [ ] negative [ ] anemia [ ] bleeding problem  Allergic/Immune: [ ] negative [ ] itchy eyes [ ] nasal discharge [ ] hives [ ] angioedema    [x ] All other systems negative  [ ] Unable to assess ROS due to    Current Meds:  albuterol/ipratropium for Nebulization 3 milliLiter(s) Nebulizer every 6 hours  atorvastatin 40 milliGRAM(s) Oral at bedtime  budesonide 160 MICROgram(s)/formoterol 4.5 MICROgram(s) Inhaler 2 Puff(s) Inhalation two times a day  carvedilol 12.5 milliGRAM(s) Oral every 12 hours  chlorhexidine 2% Cloths 1 Application(s) Topical <User Schedule>  clopidogrel Tablet 75 milliGRAM(s) Oral daily  doxazosin 4 milliGRAM(s) Oral at bedtime  guaiFENesin  milliGRAM(s) Oral every 12 hours  heparin  Infusion 1300 Unit(s)/Hr IV Continuous <Continuous>  insulin lispro (ADMELOG) corrective regimen sliding scale   SubCutaneous three times a day before meals  pantoprazole    Tablet 40 milliGRAM(s) Oral before breakfast  polyethylene glycol 3350 17 Gram(s) Oral daily  senna 2 Tablet(s) Oral at bedtime      PAST MEDICAL & SURGICAL HISTORY:  HTN (hypertension)    Hyperlipidemia    PVD (peripheral vascular disease)    S/P BKA (below knee amputation), right        Vitals:  T(F): 98.3 (03-14), Max: 98.3 (03-14)  HR: 106 (03-14) (99 - 106)  BP: 134/70 (03-14) (109/60 - 137/62)  RR: 18 (03-14)  SpO2: 94% (03-14)  I&O's Summary    12 Mar 2022 06:01  -  13 Mar 2022 07:00  --------------------------------------------------------  IN: 584 mL / OUT: 50 mL / NET: 534 mL    13 Mar 2022 07:01  -  14 Mar 2022 06:43  --------------------------------------------------------  IN: 0 mL / OUT: 450 mL / NET: -450 mL    Physical Exam:  Appearance: No acute distress; well appearing  Eyes: PERRL, EOMI, pink conjunctiva  HEENT: Normal oral mucosa  Cardiovascular: RRR, S1, S2, no murmurs, rubs, or gallops; no edema; no JVD; s/p RLE amputation, LLE cool without palpable pulses   Respiratory: Clear to auscultation bilaterally  Gastrointestinal: soft, non-tender, non-distended with normal bowel sounds  Musculoskeletal: No clubbing; no joint deformity   Neurologic: Non-focal  Lymphatic: No lymphadenopathy  Psychiatry: AAOx3, mood & affect appropriate  Skin: No rashes, ecchymoses, or cyanosis                            10.4   17.50 )-----------( 254      ( 13 Mar 2022 06:32 )             30.6     03-13    141  |  108  |  58<H>  ----------------------------<  227<H>  3.7   |  13<L>  |  3.20<H>    Ca    8.5      13 Mar 2022 06:32  Phos  6.6     03-13  Mg     1.8     03-13    TPro  6.8  /  Alb  2.7<L>  /  TBili  0.3  /  DBili  x   /  AST  152<H>  /  ALT  53<H>  /  AlkPhos  134<H>  03-13    PTT - ( 13 Mar 2022 23:01 )  PTT:61.5 sec    Serum Pro-Brain Natriuretic Peptide: 5764 pg/mL (03-11 @ 17:53)

## 2022-03-14 NOTE — CONSULT NOTE ADULT - PROBLEM SELECTOR RECOMMENDATION 3
Anion gap metabolic acidosis likely from renal failure  Check lactic acid  Urine ketones  Check VBG  Would start Bicitra 30 mg PO bid & sodium bicarbonate 1300 tid  If pH < 7.2 then would place on Bicarb gtt (150 meq of sodium bicarb in D5W) @ 40cc/hr x 6 hrs.

## 2022-03-14 NOTE — SWALLOW BEDSIDE ASSESSMENT ADULT - SLP GENERAL OBSERVATIONS
Pt encountered bedside, AA&Ox2 (Self/Date) and required increased time to orient to location and reason for admission. Pt leaning to left and unable to re-position self due to LE weakness. Pt able to follow simple directives only and unable to answer questions re: hospitalization.  Suspect mild cognitive deficits at baseline. While pt remained awake/alert, at times pt noted with reduced sustained attention to task and required encouragement to remain engaged. D/W RN plan for 1:1 assist with all PO intake.

## 2022-03-14 NOTE — PROGRESS NOTE ADULT - PROBLEM SELECTOR PLAN 2
Completed a course of abx at OSH for PNA- currently off abx  Afebrile, nontoxic appearing  Check UA, PCXR  Check blood cultures if febrile

## 2022-03-14 NOTE — PROGRESS NOTE ADULT - ATTENDING COMMENTS
76 year old man with HTN, PVD, R AKA presenting with acute on chronic decompensated diastolic and systolic heart failure EF 45% in the setting of a mechanical fall, complicated by acute LLE arterial occlusions s/p attempted/unsuccessful peripheral angioplasty. Pre-surgical workup at OSH included  nuclear stress test which was abnormal, thus transferred for cardiac catheterization and evaluation prior to planned BKA. Pending renal improvement/optimization prior to cardiac cath. Admission creatinine 4; currently 3 and downtrending.    Hank Mckinney MD. MPH. KELLEY.  Cardiovascular Specialist  Department of Cardiology  [facility]

## 2022-03-15 LAB
ALBUMIN SERPL ELPH-MCNC: 2.8 G/DL — LOW (ref 3.3–5)
ALP SERPL-CCNC: 151 U/L — HIGH (ref 40–120)
ALT FLD-CCNC: 79 U/L — HIGH (ref 10–45)
ANION GAP SERPL CALC-SCNC: 15 MMOL/L — SIGNIFICANT CHANGE UP (ref 5–17)
APPEARANCE UR: ABNORMAL
AST SERPL-CCNC: 139 U/L — HIGH (ref 10–40)
BACTERIA # UR AUTO: NEGATIVE — SIGNIFICANT CHANGE UP
BASOPHILS # BLD AUTO: 0 K/UL — SIGNIFICANT CHANGE UP (ref 0–0.2)
BASOPHILS NFR BLD AUTO: 0 % — SIGNIFICANT CHANGE UP (ref 0–2)
BILIRUB DIRECT SERPL-MCNC: 0.1 MG/DL — SIGNIFICANT CHANGE UP (ref 0–0.3)
BILIRUB INDIRECT FLD-MCNC: 0.2 MG/DL — SIGNIFICANT CHANGE UP (ref 0.2–1)
BILIRUB SERPL-MCNC: 0.3 MG/DL — SIGNIFICANT CHANGE UP (ref 0.2–1.2)
BILIRUB UR-MCNC: NEGATIVE — SIGNIFICANT CHANGE UP
BUN SERPL-MCNC: 45 MG/DL — HIGH (ref 7–23)
CALCIUM SERPL-MCNC: 8.7 MG/DL — SIGNIFICANT CHANGE UP (ref 8.4–10.5)
CHLORIDE SERPL-SCNC: 119 MMOL/L — HIGH (ref 96–108)
CHLORIDE UR-SCNC: 64 MMOL/L — SIGNIFICANT CHANGE UP
CO2 SERPL-SCNC: 16 MMOL/L — LOW (ref 22–31)
COLOR SPEC: SIGNIFICANT CHANGE UP
COMMENT - URINE: SIGNIFICANT CHANGE UP
CREAT ?TM UR-MCNC: 68 MG/DL — SIGNIFICANT CHANGE UP
CREAT SERPL-MCNC: 2.61 MG/DL — HIGH (ref 0.5–1.3)
DIFF PNL FLD: ABNORMAL
EGFR: 25 ML/MIN/1.73M2 — LOW
EOSINOPHIL # BLD AUTO: 0 K/UL — SIGNIFICANT CHANGE UP (ref 0–0.5)
EOSINOPHIL NFR BLD AUTO: 0 % — SIGNIFICANT CHANGE UP (ref 0–6)
EPI CELLS # UR: 2 /HPF — SIGNIFICANT CHANGE UP
GLUCOSE BLDC GLUCOMTR-MCNC: 116 MG/DL — HIGH (ref 70–99)
GLUCOSE BLDC GLUCOMTR-MCNC: 129 MG/DL — HIGH (ref 70–99)
GLUCOSE BLDC GLUCOMTR-MCNC: 130 MG/DL — HIGH (ref 70–99)
GLUCOSE BLDC GLUCOMTR-MCNC: 135 MG/DL — HIGH (ref 70–99)
GLUCOSE SERPL-MCNC: 117 MG/DL — HIGH (ref 70–99)
GLUCOSE UR QL: NEGATIVE — SIGNIFICANT CHANGE UP
GRAN CASTS # UR COMP ASSIST: 2 /LPF — SIGNIFICANT CHANGE UP
HCT VFR BLD CALC: 30.4 % — LOW (ref 39–50)
HGB BLD-MCNC: 10.1 G/DL — LOW (ref 13–17)
HYALINE CASTS # UR AUTO: 7 /LPF — HIGH (ref 0–2)
KETONES UR-MCNC: NEGATIVE — SIGNIFICANT CHANGE UP
LEUKOCYTE ESTERASE UR-ACNC: NEGATIVE — SIGNIFICANT CHANGE UP
LYMPHOCYTES # BLD AUTO: 0.87 K/UL — LOW (ref 1–3.3)
LYMPHOCYTES # BLD AUTO: 3.5 % — LOW (ref 13–44)
MAGNESIUM SERPL-MCNC: 1.8 MG/DL — SIGNIFICANT CHANGE UP (ref 1.6–2.6)
MCHC RBC-ENTMCNC: 25.8 PG — LOW (ref 27–34)
MCHC RBC-ENTMCNC: 33.2 GM/DL — SIGNIFICANT CHANGE UP (ref 32–36)
MCV RBC AUTO: 77.6 FL — LOW (ref 80–100)
MONOCYTES # BLD AUTO: 1.29 K/UL — HIGH (ref 0–0.9)
MONOCYTES NFR BLD AUTO: 5.2 % — SIGNIFICANT CHANGE UP (ref 2–14)
MRSA PCR RESULT.: SIGNIFICANT CHANGE UP
NEUTROPHILS # BLD AUTO: 22.03 K/UL — HIGH (ref 1.8–7.4)
NEUTROPHILS NFR BLD AUTO: 88.7 % — HIGH (ref 43–77)
NITRITE UR-MCNC: NEGATIVE — SIGNIFICANT CHANGE UP
OSMOLALITY UR: 365 MOS/KG — SIGNIFICANT CHANGE UP (ref 300–900)
PH UR: 5.5 — SIGNIFICANT CHANGE UP (ref 5–8)
PHOSPHATE SERPL-MCNC: 3.6 MG/DL — SIGNIFICANT CHANGE UP (ref 2.5–4.5)
PLATELET # BLD AUTO: 284 K/UL — SIGNIFICANT CHANGE UP (ref 150–400)
POTASSIUM SERPL-MCNC: 3.5 MMOL/L — SIGNIFICANT CHANGE UP (ref 3.5–5.3)
POTASSIUM SERPL-SCNC: 3.5 MMOL/L — SIGNIFICANT CHANGE UP (ref 3.5–5.3)
POTASSIUM UR-SCNC: 21 MMOL/L — SIGNIFICANT CHANGE UP
PROT SERPL-MCNC: 6.8 G/DL — SIGNIFICANT CHANGE UP (ref 6–8.3)
PROT UR-MCNC: ABNORMAL
RBC # BLD: 3.92 M/UL — LOW (ref 4.2–5.8)
RBC # FLD: 15.7 % — HIGH (ref 10.3–14.5)
RBC CASTS # UR COMP ASSIST: 51 /HPF — HIGH (ref 0–4)
S AUREUS DNA NOSE QL NAA+PROBE: SIGNIFICANT CHANGE UP
SODIUM SERPL-SCNC: 150 MMOL/L — HIGH (ref 135–145)
SODIUM UR-SCNC: 68 MMOL/L — SIGNIFICANT CHANGE UP
SP GR SPEC: 1.01 — SIGNIFICANT CHANGE UP (ref 1.01–1.02)
UROBILINOGEN FLD QL: NEGATIVE — SIGNIFICANT CHANGE UP
WBC # BLD: 24.84 K/UL — HIGH (ref 3.8–10.5)
WBC # FLD AUTO: 24.84 K/UL — HIGH (ref 3.8–10.5)
WBC UR QL: 6 /HPF — HIGH (ref 0–5)

## 2022-03-15 PROCEDURE — 76770 US EXAM ABDO BACK WALL COMP: CPT | Mod: 26

## 2022-03-15 PROCEDURE — 99233 SBSQ HOSP IP/OBS HIGH 50: CPT | Mod: GC

## 2022-03-15 PROCEDURE — 99223 1ST HOSP IP/OBS HIGH 75: CPT

## 2022-03-15 PROCEDURE — 99232 SBSQ HOSP IP/OBS MODERATE 35: CPT

## 2022-03-15 PROCEDURE — 99233 SBSQ HOSP IP/OBS HIGH 50: CPT

## 2022-03-15 RX ORDER — POTASSIUM CHLORIDE 20 MEQ
40 PACKET (EA) ORAL ONCE
Refills: 0 | Status: DISCONTINUED | OUTPATIENT
Start: 2022-03-15 | End: 2022-03-15

## 2022-03-15 RX ORDER — SODIUM CHLORIDE 9 MG/ML
960 INJECTION, SOLUTION INTRAVENOUS
Refills: 0 | Status: DISCONTINUED | OUTPATIENT
Start: 2022-03-15 | End: 2022-03-17

## 2022-03-15 RX ORDER — SODIUM CHLORIDE 9 MG/ML
1000 INJECTION, SOLUTION INTRAVENOUS
Refills: 0 | Status: COMPLETED | OUTPATIENT
Start: 2022-03-15 | End: 2022-03-15

## 2022-03-15 RX ORDER — POTASSIUM CHLORIDE 20 MEQ
20 PACKET (EA) ORAL
Refills: 0 | Status: COMPLETED | OUTPATIENT
Start: 2022-03-15 | End: 2022-03-15

## 2022-03-15 RX ADMIN — POLYETHYLENE GLYCOL 3350 17 GRAM(S): 17 POWDER, FOR SOLUTION ORAL at 11:44

## 2022-03-15 RX ADMIN — HEPARIN SODIUM 13 UNIT(S)/HR: 5000 INJECTION INTRAVENOUS; SUBCUTANEOUS at 09:37

## 2022-03-15 RX ADMIN — BUDESONIDE AND FORMOTEROL FUMARATE DIHYDRATE 2 PUFF(S): 160; 4.5 AEROSOL RESPIRATORY (INHALATION) at 11:43

## 2022-03-15 RX ADMIN — Medication 600 MILLIGRAM(S): at 18:47

## 2022-03-15 RX ADMIN — SODIUM CHLORIDE 80 MILLILITER(S): 9 INJECTION, SOLUTION INTRAVENOUS at 16:29

## 2022-03-15 RX ADMIN — Medication 4 MILLIGRAM(S): at 21:16

## 2022-03-15 RX ADMIN — CLOPIDOGREL BISULFATE 75 MILLIGRAM(S): 75 TABLET, FILM COATED ORAL at 11:44

## 2022-03-15 RX ADMIN — SODIUM CHLORIDE 50 MILLILITER(S): 9 INJECTION, SOLUTION INTRAVENOUS at 04:12

## 2022-03-15 RX ADMIN — ATORVASTATIN CALCIUM 40 MILLIGRAM(S): 80 TABLET, FILM COATED ORAL at 21:16

## 2022-03-15 RX ADMIN — Medication 50 MILLIEQUIVALENT(S): at 11:42

## 2022-03-15 RX ADMIN — Medication 50 MILLIEQUIVALENT(S): at 09:36

## 2022-03-15 RX ADMIN — Medication 3 MILLILITER(S): at 11:43

## 2022-03-15 RX ADMIN — SODIUM CHLORIDE 50 MILLILITER(S): 9 INJECTION, SOLUTION INTRAVENOUS at 09:37

## 2022-03-15 RX ADMIN — CARVEDILOL PHOSPHATE 12.5 MILLIGRAM(S): 80 CAPSULE, EXTENDED RELEASE ORAL at 05:49

## 2022-03-15 RX ADMIN — PANTOPRAZOLE SODIUM 40 MILLIGRAM(S): 20 TABLET, DELAYED RELEASE ORAL at 05:49

## 2022-03-15 RX ADMIN — Medication 3 MILLILITER(S): at 18:46

## 2022-03-15 RX ADMIN — Medication 3 MILLILITER(S): at 02:54

## 2022-03-15 RX ADMIN — HEPARIN SODIUM 13 UNIT(S)/HR: 5000 INJECTION INTRAVENOUS; SUBCUTANEOUS at 19:27

## 2022-03-15 RX ADMIN — BUDESONIDE AND FORMOTEROL FUMARATE DIHYDRATE 2 PUFF(S): 160; 4.5 AEROSOL RESPIRATORY (INHALATION) at 20:56

## 2022-03-15 RX ADMIN — CHLORHEXIDINE GLUCONATE 1 APPLICATION(S): 213 SOLUTION TOPICAL at 09:24

## 2022-03-15 RX ADMIN — Medication 600 MILLIGRAM(S): at 05:49

## 2022-03-15 RX ADMIN — CARVEDILOL PHOSPHATE 12.5 MILLIGRAM(S): 80 CAPSULE, EXTENDED RELEASE ORAL at 18:46

## 2022-03-15 RX ADMIN — Medication 3 MILLILITER(S): at 05:49

## 2022-03-15 NOTE — PHYSICAL THERAPY INITIAL EVALUATION ADULT - RANGE OF MOTION EXAMINATION, REHAB EVAL
R AK stump WFL/bilateral upper extremity ROM was WFL (within functional limits)/Left LE ROM was WFL (within functional limits)

## 2022-03-15 NOTE — CONSULT NOTE ADULT - SUBJECTIVE AND OBJECTIVE BOX
Patient is a 76y old  Male who presents with a chief complaint of Cardiac clearance for ischemic limb (15 Mar 2022 13:34)    HPI:  76M with HTN, HLD, PVD, R AKA who presented to [facility] earlier this month after a mechanical fall from his wheelchair at home. NO LOC or head injury and he denies dizziness syncope. Noted to have sepsis due to pneumonia and treated with Azithromycin and Ceftriaxone for pneumonia.  Developed severe LLE pain due to ischemia.  Plan was for angiogram, however, developed HTN.  Transferred to [facility] on 3/11/2022 for cardiac cath    No fever, however noted to have worsening leukocytosis.    ID asked to help management    ECHO revealed EF 35-40% , grade 2 DD.  (11 Mar 2022 16:57)    prior hospital charts reviewed [  ]  primary team notes reviewed [  ]  other consultant notes reviewed [  ]    PAST MEDICAL & SURGICAL HISTORY:  HTN (hypertension)  Hyperlipidemia  PVD (peripheral vascular disease)  S/P BKA (below knee amputation), right    Allergies  No Known Allergies    ANTIMICROBIALS:  none    MEDICATIONS  (STANDING):  albuterol/ipratropium for Nebulization 3 every 6 hours  atorvastatin 40 at bedtime  budesonide 160 MICROgram(s)/formoterol 4.5 MICROgram(s) Inhaler 2 two times a day  carvedilol 12.5 every 12 hours  clopidogrel Tablet 75 daily  doxazosin 4 at bedtime  guaiFENesin  every 12 hours  heparin  Infusion 1300 <Continuous>  insulin lispro (ADMELOG) corrective regimen sliding scale  three times a day before meals  pantoprazole    Tablet 40 before breakfast  polyethylene glycol 3350 17 daily  senna 2 at bedtime    SOCIAL HISTORY:  lives alone; former smoker 30 pack year quit 10 years ago, past ETOH use quit    FAMILY HISTORY:      REVIEW OF SYSTEMS  [  ] ROS unobtainable because:    [  ] All other systems negative except as noted below:	    Constitutional:  [ ] fever [ ] chills  [ ] weight loss  [ ] weakness  Skin:  [ ] rash [ ] phlebitis	  Eyes: [ ] icterus [ ] pain  [ ] discharge	  ENMT: [ ] sore throat  [ ] thrush [ ] ulcers [ ] exudates  Respiratory: [ ] dyspnea [ ] hemoptysis [ ] cough [ ] sputum	  Cardiovascular:  [ ] chest pain [ ] palpitations [ ] edema	  Gastrointestinal:  [ ] nausea [ ] vomiting [ ] diarrhea [ ] constipation [ ] pain	  Genitourinary:  [ ] dysuria [ ] frequency [ ] hematuria [ ] discharge [ ] flank pain  [ ] incontinence  Musculoskeletal:  [ ] myalgias [ ] arthralgias [ ] arthritis  [ ] back pain  Neurological:  [ ] headache [ ] seizures  [ ] confusion/altered mental status  Psychiatric:  [ ] anxiety [ ] depression	  Hematology/Lymphatics:  [ ] lymphadenopathy  Endocrine:  [ ] adrenal [ ] thyroid  Allergic/Immunologic:	 [ ] transplant [ ] seasonal    Vital Signs Last 24 Hrs  T(F): 97.8 (03-15-22 @ 12:17), Max: 98.6 (22 @ 11:44)  Vital Signs Last 24 Hrs  HR: 92 (03-15-22 @ 12:17) (78 - 97)  BP: 151/79 (03-15-22 @ 12:17) (137/68 - 158/69)  RR: 18 (03-15-22 @ 12:17)  SpO2: 99% (03-15-22 @ 12:17) (95% - 99%)  Wt(kg): --    PHYSICAL EXAM:                              10.1   24.84 )-----------( 284      ( 15 Mar 2022 06:08 )             30.4     150<H>  |  119<H>  |  45<H>  ----------------------------<  117<H>  3.5   |  16<L>  |  2.61<H>    Ca    8.7      15 Mar 2022 06:08  Phos  3.6     03-15  Mg     1.8     03-15    WBC Count: 24.84 (03-15-22 @ 06:08)  WBC Count: 21.47 (22 @ 06:54)  WBC Count: 17.50 (22 @ 06:32)  WBC Count: 16.44 (22 @ 04:33)  WBC Count: 16.38 (22 @ 17:53)    Urinalysis Basic - ( 15 Mar 2022 02:23 )  Color: Light Yellow / Appearance: Slightly Turbid / S.012 / pH: x  Gluc: x / Ketone: Negative  / Bili: Negative / Urobili: Negative   Blood: x / Protein: 30 mg/dL / Nitrite: Negative   Leuk Esterase: Negative / RBC: 51 /hpf / WBC 6 /HPF   Sq Epi: x / Non Sq Epi: 2 /hpf / Bacteria: Negative    MICROBIOLOGY:    RADIOLOGY:  imaging below personally reviewed and agree with findings    Xray Chest 1 View- PORTABLE-Routine (Xray Chest 1 View- PORTABLE-Routine .) (22 @ 17:33) >  IMPRESSION:  Similar left infiltrate. Smaller left effusion.    Xray Chest 1 View AP/PA (22 @ 18:43) >  IMPRESSION:  Bibasilar opacities may represent atelectasis and/or layering pleural effusion.        ECHO:  TTE with Doppler (w/Cont) (22 @ 08:43) >  Conclusions:  1. Tethered mitral valve leaflets with normal opening. Mild mitral regurgitation.  2. Aortic valve not well visualized; appears to be a calcified trileaflet valve with normal opening. No aortic valve regurgitation seen.  3. Endocardial visualization enhanced with intravenous injection of Ultrasonic Enhancing Agent (Definity).  Moderate segmental left ventricular systolic dysfunction.  The inferolateral, basal inferior, and basal septal walls are akinetic. The anterolateral and apical walls are hypokinetic. No left ventricular thrombus.  4. Normal right ventricular size and function.  5. Trace pericardial effusion.  *** No previous Echo exam.   Patient is a 76y old  Male who presents with a chief complaint of Cardiac clearance for ischemic limb (15 Mar 2022 13:34)    HPI:  76M with HTN, HLD, PVD, R AKA who presented to [facility] earlier this month after a mechanical fall from his wheelchair at home. NO LOC or head injury and he denies dizziness syncope. Noted to have sepsis due to pneumonia and treated with Azithromycin and Ceftriaxone for pneumonia.  Developed severe LLE pain due to ischemia.  Plan was for angiogram, however, developed HTN.  Had a positive stress test and Transferred to [facility] on 3/11/2022 for cardiac cath.  No fever, however noted to have worsening leukocytosis.  He does not recall diarrhea.  No abdominal pain.  ID asked to help management    ECHO revealed EF 35-40% , grade 2 DD.  (11 Mar 2022 16:57)    prior hospital charts reviewed [  ]  primary team notes reviewed [  ]  other consultant notes reviewed [  ]    PAST MEDICAL & SURGICAL HISTORY:  HTN (hypertension)  Hyperlipidemia  PVD (peripheral vascular disease)  S/P BKA (below knee amputation), right    Allergies  No Known Allergies    ANTIMICROBIALS:  none    MEDICATIONS  (STANDING):  albuterol/ipratropium for Nebulization 3 every 6 hours  atorvastatin 40 at bedtime  budesonide 160 MICROgram(s)/formoterol 4.5 MICROgram(s) Inhaler 2 two times a day  carvedilol 12.5 every 12 hours  clopidogrel Tablet 75 daily  doxazosin 4 at bedtime  guaiFENesin  every 12 hours  heparin  Infusion 1300 <Continuous>  insulin lispro (ADMELOG) corrective regimen sliding scale  three times a day before meals  pantoprazole    Tablet 40 before breakfast  polyethylene glycol 3350 17 daily  senna 2 at bedtime    SOCIAL HISTORY:  lives alone; former smoker 30 pack year quit 10 years ago, past ETOH use quit    FAMILY HISTORY:  parents both  of "old age"    REVIEW OF SYSTEMS  [  ] ROS unobtainable because:    [  ] All other systems negative except as noted below:	    Constitutional:  [ ] fever [ ] chills  [ ] weight loss  [ ] weakness  Skin:  [ ] rash [ ] phlebitis	  Eyes: [ ] icterus [ ] pain  [ ] discharge	  ENMT: [ ] sore throat  [ ] thrush [ ] ulcers [ ] exudates  Respiratory: [ ] dyspnea [ ] hemoptysis [ ] cough [ ] sputum	  Cardiovascular:  [ ] chest pain [ ] palpitations [ ] edema	  Gastrointestinal:  [ ] nausea [ ] vomiting [ ] diarrhea [ ] constipation [ ] pain	  Genitourinary:  [ ] dysuria [ ] frequency [ ] hematuria [ ] discharge [ ] flank pain  [ ] incontinence  Musculoskeletal:  [ ] myalgias [ ] arthralgias [ ] arthritis  [ ] back pain  Neurological:  [ ] headache [ ] seizures  [ ] confusion/altered mental status  Psychiatric:  [ ] anxiety [ ] depression	  Hematology/Lymphatics:  [ ] lymphadenopathy  Endocrine:  [ ] adrenal [ ] thyroid  Allergic/Immunologic:	 [ ] transplant [ ] seasonal    Vital Signs Last 24 Hrs  T(F): 97.8 (03-15-22 @ 12:17), Max: 98.6 (22 @ 11:44)  Vital Signs Last 24 Hrs  HR: 92 (03-15-22 @ 12:17) (78 - 97)  BP: 151/79 (03-15-22 @ 12:17) (137/68 - 158/69)  RR: 18 (03-15-22 @ 12:17)  SpO2: 99% (03-15-22 @ 12:17) (95% - 99%)  Wt(kg): --    PHYSICAL EXAMINATION:  generally non-toxic  Head/Eyes:  no icterus  ENT:  neck supple  Cardiac:  S1, S2  Pulmonary:  coarse rhochi  Abdomen:  soft and non-tender  MSK:  R AKA, stump okay; L leg with cold foot; dry early gangrenous changes; no drainage; no malodor  Neuro:  awake and alert, decreased sensation LLE  Psych:  appropriate affect  Vascular:  L hand PIV without phlebitis  Skin:  No rash                        10.1   24.84 )-----------( 284      ( 15 Mar 2022 06:08 )             30.4     150<H>  |  119<H>  |  45<H>  ----------------------------<  117<H>  3.5   |  16<L>  |  2.61<H>    Ca    8.7      15 Mar 2022 06:08  Phos  3.6     03-15  Mg     1.8     03-15    WBC Count: 24.84 (03-15-22 @ 06:08)  WBC Count: 21.47 (22 @ 06:54)  WBC Count: 17.50 (22 @ 06:32)  WBC Count: 16.44 (22 @ 04:33)  WBC Count: 16.38 (22 @ 17:53)    Urinalysis Basic - ( 15 Mar 2022 02:23 )  Color: Light Yellow / Appearance: Slightly Turbid / S.012 / pH: x  Gluc: x / Ketone: Negative  / Bili: Negative / Urobili: Negative   Blood: x / Protein: 30 mg/dL / Nitrite: Negative   Leuk Esterase: Negative / RBC: 51 /hpf / WBC 6 /HPF   Sq Epi: x / Non Sq Epi: 2 /hpf / Bacteria: Negative    MICROBIOLOGY:    RADIOLOGY:  imaging below personally reviewed and agree with findings    Xray Chest 1 View- PORTABLE-Routine (Xray Chest 1 View- PORTABLE-Routine .) (22 @ 17:33) >  IMPRESSION:  Similar left infiltrate. Smaller left effusion.    Xray Chest 1 View AP/PA (22 @ 18:43) >  IMPRESSION:  Bibasilar opacities may represent atelectasis and/or layering pleural effusion.        ECHO:  TTE with Doppler (w/Cont) (22 @ 08:43) >  Conclusions:  1. Tethered mitral valve leaflets with normal opening. Mild mitral regurgitation.  2. Aortic valve not well visualized; appears to be a calcified trileaflet valve with normal opening. No aortic valve regurgitation seen.  3. Endocardial visualization enhanced with intravenous injection of Ultrasonic Enhancing Agent (Definity).  Moderate segmental left ventricular systolic dysfunction.  The inferolateral, basal inferior, and basal septal walls are akinetic. The anterolateral and apical walls are hypokinetic. No left ventricular thrombus.  4. Normal right ventricular size and function.  5. Trace pericardial effusion.  *** No previous Echo exam.

## 2022-03-15 NOTE — PROGRESS NOTE ADULT - ASSESSMENT
76 year old man with HTN, PVD, R AKA brought by EMS to [facility] after a mechanical fall from his wheelchair found exacerbation of heart failure, reduced EF 45%, apparent acute on chronic systolic heart failure and has acute LLE arterial occlusions and attempted peripheral angiogram was unsuccessful. Patient was evaluated for LLE BKA and had abnormal nuclear stress test, thus transferred for cardiac catheterization and evaluation prior to planned BKA. Maintained on heparin infusion, Vascular surgery evaluating. Seek IV diuresis. Coronary angiography planned tomorrow, has advanced CKD and needs optimization.    TTE 3/12- EF 35%, mild MR, moderate segmental LV dysfunction. IL, basal inferior, basal septal are akinetic. AL and apical walls are hypokinetic.   -appreciate renal recommendations regarding CKD and potential worsening post cath  - Continue coreg 12.5 mg bid   - Continue heparin gtt for CFA and SFA occlusions  - agree with plavix  - high intensity statin  - L CFA and SFA occlusions w unsuccessful angio. Recommending L BKA possibly AKA per vascular  - vascular aware, unclear if will return to [facility] for surgery, appreciate recs  -plan for [facility] today. Further risk stratification pending [facility]

## 2022-03-15 NOTE — PROGRESS NOTE ADULT - SUBJECTIVE AND OBJECTIVE BOX
[facility] DIVISION OF KIDNEY DISEASES AND HYPERTENSION -- FOLLOW UP NOTE  --------------------------------------------------------------------------------  Chief Complaint: BAUDILIO on CKD vs ?CKD    24 hour events/subjective: Pt. seen and examined, tired appearing. Plan for [facility] noted. Scr stable/elevated at 2.6 and SCO2 at 16.         PAST HISTORY  --------------------------------------------------------------------------------  No significant changes to PMH, PSH, FHx, SHx, unless otherwise noted    ALLERGIES & MEDICATIONS  --------------------------------------------------------------------------------  Allergies    No Known Allergies    Intolerances      Standing Inpatient Medications  albuterol/ipratropium for Nebulization 3 milliLiter(s) Nebulizer every 6 hours  atorvastatin 40 milliGRAM(s) Oral at bedtime  budesonide 160 MICROgram(s)/formoterol 4.5 MICROgram(s) Inhaler 2 Puff(s) Inhalation two times a day  carvedilol 12.5 milliGRAM(s) Oral every 12 hours  chlorhexidine 2% Cloths 1 Application(s) Topical <User Schedule>  chlorhexidine 4% Liquid 1 Application(s) Topical <User Schedule>  clopidogrel Tablet 75 milliGRAM(s) Oral daily  doxazosin 4 milliGRAM(s) Oral at bedtime  guaiFENesin  milliGRAM(s) Oral every 12 hours  heparin  Infusion 1300 Unit(s)/Hr IV Continuous <Continuous>  insulin lispro (ADMELOG) corrective regimen sliding scale   SubCutaneous three times a day before meals  pantoprazole    Tablet 40 milliGRAM(s) Oral before breakfast  polyethylene glycol 3350 17 Gram(s) Oral daily  senna 2 Tablet(s) Oral at bedtime    PRN Inpatient Medications      REVIEW OF SYSTEMS  --------------------------------------------------------------------------------  Gen: weakness+  Skin: No rashes  Head/Eyes/Ears: Normal hearing,   Respiratory: No dyspnea, cough  CV: No chest pain  GI: No abdominal pain, diarrhea  : as per HPI, urena+  MSK: No  edema    All other systems were reviewed and are negative, except as noted.    VITALS/PHYSICAL EXAM  --------------------------------------------------------------------------------  T(C): 36.6 (03-15-22 @ 12:17), Max: 36.6 (03-15-22 @ 12:17)  HR: 92 (03-15-22 @ 12:17) (78 - 97)  BP: 151/79 (03-15-22 @ 12:17) (137/68 - 158/69)  RR: 18 (03-15-22 @ 12:17) (18 - 18)  SpO2: 99% (03-15-22 @ 12:17) (95% - 99%)  Wt(kg): --      03-14-22 @ 07:01  -  03-15-22 @ 07:00  --------------------------------------------------------  IN: 104 mL / OUT: 900 mL / NET: -796 mL    03-15-22 @ 07:01  -  03-15-22 @ 13:34  --------------------------------------------------------  IN: 280 mL / OUT: 0 mL / NET: 280 mL      Physical Exam:  	Gen: NAD  	HEENT: MMM  	Pulm: CTA B/L  	CV: S1S2  	Abd: Soft, +BS   	Ext: R AKA, NO edema LE  	Neuro: Awake, alert  	Skin: Warm and dry  	: +urena with clear urine    LABS/STUDIES  --------------------------------------------------------------------------------              10.1   24.84 >-----------<  284      [03-15-22 @ 06:08]              30.4     150  |  119  |  45  ----------------------------<  117      [03-15-22 @ 06:08]  3.5   |  16  |  2.61        Ca     8.7     [03-15-22 @ 06:08]      Mg     1.8     [03-15-22 @ 06:08]      Phos  3.6     [03-15-22 @ 06:08]    Creatinine Trend:  SCr 2.61 [03-15 @ 06:08]  SCr 3.06 [03-14 @ 06:54]  SCr 3.20 [03-13 @ 06:32]  SCr 3.78 [03-12 @ 04:33]  SCr 3.92 [03-11 @ 17:53]    Urinalysis - [03-15-22 @ 02:23]      Color Light Yellow / Appearance Slightly Turbid / SG 1.012 / pH 5.5      Gluc Negative / Ketone Negative  / Bili Negative / Urobili Negative       Blood Large / Protein 30 mg/dL / Leuk Est Negative / Nitrite Negative      RBC 51 / WBC 6 / Hyaline 7 / Gran 2 / Sq Epi  / Non Sq Epi 2 / Bacteria Negative    Urine Creatinine 68      [03-15-22 @ 02:24]  Urine Sodium 68      [03-15-22 @ 02:24]  Urine Potassium 21      [03-15-22 @ 02:24]  Urine Chloride 64      [03-15-22 @ 02:24]  Urine Osmolality 365      [03-15-22 @ 02:24]    HCV 0.16, Nonreact      [03-12-22 @ 00:43]

## 2022-03-15 NOTE — PHYSICAL THERAPY INITIAL EVALUATION ADULT - ADDITIONAL COMMENTS
Pt lives alone in an apartment that had elevator access. Independent with ADLs. Pt s/p R AK and uses prosthesis. Unable to use it for the past week due to his impaired functional mobility. Pt transfers bed to chair via stand pivot using RW and uses RW for short distance (5') ambulation as per pt. Pt owns a walker, wheelchair and bedside commode at home.

## 2022-03-15 NOTE — PROGRESS NOTE ADULT - ASSESSMENT
76M w/ pmh of HTN, HLD, PVD s/p R AKA who was brought by EMS to [facility] after a mechanical fall from his wheelchair found to have CHF exacerbation, PNA as well as acute LLE arterial occlusions w/ unsuccessful angiogram. Stress test at OSH was positive, transferred for angiogram for cardiac clearance for possible LLE BKA

## 2022-03-15 NOTE — GOALS OF CARE CONVERSATION - ADVANCED CARE PLANNING - CONVERSATION DETAILS
Introduced self and role of the PCE.  Patient was seen for goals of care conversation and reports his daughter Amanda is his HCP.  Pt has not discussed care or medical decisions for his care.  Pt asked to provide a copy of the HCP form.  CPR/ intubation procedures and possible complications explained.  Pt wants to remain full code status.   Contact information for further needs provided.  No [employer] exemptions noted. Code XNUE provided.      Anny Ferreira RN  Palliative Care Educator  [phone] cell

## 2022-03-15 NOTE — PHYSICAL THERAPY INITIAL EVALUATION ADULT - PRECAUTIONS/LIMITATIONS, REHAB EVAL
As per cardiology, pt had abnormal nuclear stress test, transferred to [facility] for cardiac catherization coronary angiography planned, BKA or possible AKA recommended. TTE 3/12- EF 35%, mild MR, moderate segmental LV dysfunction. IL, basal inferior, basal septal are akinetic. AL and apical walls are hypokinetic/fall precautions

## 2022-03-15 NOTE — PROGRESS NOTE ADULT - ATTENDING COMMENTS
I have seen this patient with the fellow and agree with their assessment and plan. I was physically present for significant portions of the evaluation and management (E/M) service provided.  I agree with the above history, physical, and plan which I have reviewed and edited where appropriate.    Pt needs cardiac cath and at this point, crt is downtrending from 3+ range and now in low 2s  Not sure what baseline is but likely has CKD  Ok to proceed with cardiac cath at this point especially if only diagnostic in nature and limit dye load  Can continue D5W for Na issues.     Renea Pereira MD  Pager   Office     Contact me directly via Microsoft Teams     (After 5 pm or on weekends please page the on-call fellow/attending, can check [email or web address] for schedule. Login is jamilah woo, schedule under [facility] medicine, psych, derm)    d/w with Cards fellow

## 2022-03-15 NOTE — PROGRESS NOTE ADULT - PROBLEM SELECTOR PLAN 1
BAUDILIO vs CKD vs BAUDILIO on CKD. Pt with no prior baseline Scr.   Baseline unknown.  No prior labs on [facility].   SCr 3.06 on 3/11/22 and improved to 2.6 today. Pt. with no prior CKD work up.  Plan for [facility] noted. Pt has a Stevan's score of 17 which imparts a 57% risk of developing post-PCI JULIO & 12.6% risk of post-PCI JULIO requiring dialysis. Continue to ACE, ARB, diuretics prior to cath. Monitor labs and urine output. Avoid NSAIDs, ACEI/ARBS, RCA and nephrotoxins. Dose medications as per eGFR.

## 2022-03-15 NOTE — PROGRESS NOTE ADULT - PROBLEM SELECTOR PLAN 2
Worsening  Completed a course of abx at OSH for PNA- currently off abx  Afebrile, nontoxic appearing  UA negative for UTI  Repeat CXR with similar L infiltrate and small L effusion  Check blood cultures if febrile  Concern that LLE may be source- ID evaluation today

## 2022-03-15 NOTE — PROGRESS NOTE ADULT - PROBLEM SELECTOR PLAN 3
Baseline unknown but improving  Renal eval appreciated- D5W with 2 amps NAHCO3 prior to cath  Renal sono ordered  Hypernatremia- IVF as above

## 2022-03-15 NOTE — CONSULT NOTE ADULT - ASSESSMENT
76M with HTN, HLD, PVD, R AKA who presented to [facility] earlier this month after a mechanical fall from his wheelchair at home. NO LOC or head injury and he denies dizziness syncope. Noted to have sepsis due to pneumonia and treated with Azithromycin and Ceftriaxone for pneumonia.  Developed severe LLE pain due to ischemia.  Plan was for angiogram, however, developed HTN.  Had a positive stress test and Transferred to [facility] on 3/11/2022 for cardiac cath.  No fever, however noted to have worsening leukocytosis.  NO definite diarrhea    Leukocytosis  - agree likely due to ischemic foot  - await surgical intervention after cardiac cath  - please send blood cultures x2 today  - if diarrhea, check cdiff and start po vancomycin  - if he develops fever, can start zosyn     BAUDILIO on CKD  - creatinine improving  - renally dose all medications    above communicated with PA [id number]

## 2022-03-15 NOTE — PROGRESS NOTE ADULT - SUBJECTIVE AND OBJECTIVE BOX
Patient seen and examined at bedside.    Overnight Events: No acute events overnight. Denies chest pain or SOB      REVIEW OF SYSTEMS:  Constitutional:     [ x] negative [ ] fevers [ ] chills [ ] weight loss [ ] weight gain  HEENT:                  [ x] negative [ ] dry eyes [ ] eye irritation [ ] postnasal drip [ ] nasal congestion  CV:                         [x ] negative  [ ] chest pain [ ] orthopnea [ ] palpitations [ ] murmur  Resp:                     [ x] negative [ ] cough [ ] shortness of breath [ ] dyspnea [ ] wheezing [ ] sputum [ ]hemoptysis  GI:                          [ x] negative [ ] nausea [ ] vomiting [ ] diarrhea [ ] constipation [ ] abd pain [ ] dysphagia   :                        [ x] negative [ ] dysuria [ ] nocturia [ ] hematuria [ ] increased urinary frequency  Musculoskeletal: [ x] negative [ ] back pain [ ] myalgias [ ] arthralgias [ ] fracture  Skin:                       [ x] negative [ ] rash [ ] itch  Neurological:        [ x] negative [ ] headache [ ] dizziness [ ] syncope [ ] weakness [ ] numbness  Psychiatric:           [ x] negative [ ] anxiety [ ] depression  Endocrine:            [ x] negative [ ] diabetes [ ] thyroid problem  Heme/Lymph:      [ x] negative [ ] anemia [ ] bleeding problem  Allergic/Immune: [x ] negative [ ] itchy eyes [ ] nasal discharge [ ] hives [ ] angioedema    [x ] All other systems negative  [ ] Unable to assess ROS due to    Current Meds:  albuterol/ipratropium for Nebulization 3 milliLiter(s) Nebulizer every 6 hours  atorvastatin 40 milliGRAM(s) Oral at bedtime  budesonide 160 MICROgram(s)/formoterol 4.5 MICROgram(s) Inhaler 2 Puff(s) Inhalation two times a day  carvedilol 12.5 milliGRAM(s) Oral every 12 hours  chlorhexidine 2% Cloths 1 Application(s) Topical <User Schedule>  chlorhexidine 4% Liquid 1 Application(s) Topical <User Schedule>  clopidogrel Tablet 75 milliGRAM(s) Oral daily  dextrose 5% 1000 milliLiter(s) IV Continuous <Continuous>  doxazosin 4 milliGRAM(s) Oral at bedtime  guaiFENesin  milliGRAM(s) Oral every 12 hours  heparin  Infusion 1300 Unit(s)/Hr IV Continuous <Continuous>  insulin lispro (ADMELOG) corrective regimen sliding scale   SubCutaneous three times a day before meals  pantoprazole    Tablet 40 milliGRAM(s) Oral before breakfast  polyethylene glycol 3350 17 Gram(s) Oral daily  senna 2 Tablet(s) Oral at bedtime      PAST MEDICAL & SURGICAL HISTORY:  HTN (hypertension)    Hyperlipidemia    PVD (peripheral vascular disease)    S/P BKA (below knee amputation), right        Vitals:  T(F): 97.5 (03-15), Max: 98 (03-14)  HR: 97 (03-15) (87 - 97)  BP: 158/69 (03-15) (125/64 - 158/69)  RR: 18 (03-15)  SpO2: 95% (03-15)  I&O's Summary    13 Mar 2022 07:01  -  14 Mar 2022 07:00  --------------------------------------------------------  IN: 0 mL / OUT: 450 mL / NET: -450 mL    14 Mar 2022 07:01  -  15 Mar 2022 06:52  --------------------------------------------------------  IN: 104 mL / OUT: 900 mL / NET: -796 mL        Physical Exam:  Appearance: No acute distress; well appearing  Eyes: PERRL, EOMI, pink conjunctiva  HEENT: Normal oral mucosa  Cardiovascular: RRR, S1, S2, no murmurs, rubs, or gallops; no edema; no JVD; s/p RLE amputation, LLE cool without palpable pulses   Respiratory: Clear to auscultation bilaterally  Gastrointestinal: soft, non-tender, non-distended with normal bowel sounds  Musculoskeletal: No clubbing; no joint deformity   Neurologic: Non-focal  Lymphatic: No lymphadenopathy  Psychiatry: AAOx3, mood & affect appropriate  Skin: No rashes, ecchymoses, or cyanosis                          10.1   24.84 )-----------( 284      ( 15 Mar 2022 06:08 )             30.4     03-15    150<H>  |  119<H>  |  45<H>  ----------------------------<  117<H>  3.5   |  16<L>  |  2.61<H>    Ca    8.7      15 Mar 2022 06:08  Phos  3.6     03-15  Mg     1.8     03-15      PTT - ( 14 Mar 2022 06:54 )  PTT:64.5 sec      Serum Pro-Brain Natriuretic Peptide: 5764 pg/mL (03-11 @ 17:53)             Patient seen and examined at bedside.    Overnight Events: No acute events overnight. Denies chest pain or SOB      REVIEW OF SYSTEMS:  Constitutional:     [ x] negative [ ] fevers [ ] chills [ ] weight loss [ ] weight gain  HEENT:                  [ x] negative [ ] dry eyes [ ] eye irritation [ ] postnasal drip [ ] nasal congestion  CV:                         [x ] negative  [ ] chest pain [ ] orthopnea [ ] palpitations [ ] murmur  Resp:                     [ x] negative [ ] cough [ ] shortness of breath [ ] dyspnea [ ] wheezing [ ] sputum [ ]hemoptysis  GI:                          [ x] negative [ ] nausea [ ] vomiting [ ] diarrhea [ ] constipation [ ] abd pain [ ] dysphagia   :                        [ x] negative [ ] dysuria [ ] nocturia [ ] hematuria [ ] increased urinary frequency  Musculoskeletal: [ x] negative [ ] back pain [ ] myalgias [ ] arthralgias [ ] fracture  Skin:                       [ x] negative [ ] rash [ ] itch  Neurological:        [ x] negative [ ] headache [ ] dizziness [ ] syncope [ ] weakness [ ] numbness  Psychiatric:           [ x] negative [ ] anxiety [ ] depression  Endocrine:            [ x] negative [ ] diabetes [ ] thyroid problem  Heme/Lymph:      [ x] negative [ ] anemia [ ] bleeding problem  Allergic/Immune: [x ] negative [ ] itchy eyes [ ] nasal discharge [ ] hives [ ] angioedema    [x ] All other systems negative  [ ] Unable to assess ROS due to    Current Meds:  albuterol/ipratropium for Nebulization 3 milliLiter(s) Nebulizer every 6 hours  atorvastatin 40 milliGRAM(s) Oral at bedtime  budesonide 160 MICROgram(s)/formoterol 4.5 MICROgram(s) Inhaler 2 Puff(s) Inhalation two times a day  carvedilol 12.5 milliGRAM(s) Oral every 12 hours  chlorhexidine 2% Cloths 1 Application(s) Topical <User Schedule>  chlorhexidine 4% Liquid 1 Application(s) Topical <User Schedule>  clopidogrel Tablet 75 milliGRAM(s) Oral daily  dextrose 5% 1000 milliLiter(s) IV Continuous <Continuous>  doxazosin 4 milliGRAM(s) Oral at bedtime  guaiFENesin  milliGRAM(s) Oral every 12 hours  heparin  Infusion 1300 Unit(s)/Hr IV Continuous <Continuous>  insulin lispro (ADMELOG) corrective regimen sliding scale   SubCutaneous three times a day before meals  pantoprazole    Tablet 40 milliGRAM(s) Oral before breakfast  polyethylene glycol 3350 17 Gram(s) Oral daily  senna 2 Tablet(s) Oral at bedtime      PAST MEDICAL & SURGICAL HISTORY:  HTN (hypertension)    Hyperlipidemia    PVD (peripheral vascular disease)    S/P BKA (below knee amputation), right        Vitals:  T(F): 97.5 (03-15), Max: 98 (03-14)  HR: 97 (03-15) (87 - 97)  BP: 158/69 (03-15) (125/64 - 158/69)  RR: 18 (03-15)  SpO2: 95% (03-15)  I&O's Summary    13 Mar 2022 07:01  -  14 Mar 2022 07:00  --------------------------------------------------------  IN: 0 mL / OUT: 450 mL / NET: -450 mL    14 Mar 2022 07:01  -  15 Mar 2022 06:52  --------------------------------------------------------  IN: 104 mL / OUT: 900 mL / NET: -796 mL        Physical Exam:  Appearance: No acute distress; well appearing  Eyes: PERRL, EOMI, pink conjunctiva  HEENT: Normal oral mucosa  Cardiovascular: RRR, S1, S2, no murmurs, rubs, or gallops; no edema; no JVD; s/p RLE amputation, LLE cool without palpable pulses   Respiratory: Clear to auscultation bilaterally  Gastrointestinal: soft, non-tender, non-distended with normal bowel sounds  Musculoskeletal: No clubbing; no joint deformity   Neurologic: Non-focal  Lymphatic: No lymphadenopathy  Psychiatry: AAOx3, mood & affect appropriate  Skin: No rashes, ecchymoses, or cyanosis                          10.1   24.84 )-----------( 284      ( 15 Mar 2022 06:08 )             30.4     03-15    150<H>  |  119<H>  |  45<H>  ----------------------------<  117<H>  3.5   |  16<L>  |  2.61<H>    Ca    8.7      15 Mar 2022 06:08  Phos  3.6     03-15  Mg     1.8     03-15    PTT - ( 14 Mar 2022 06:54 )  PTT:64.5 sec    Serum Pro-Brain Natriuretic Peptide: 5764 pg/mL (03-11 @ 17:53)

## 2022-03-15 NOTE — PROGRESS NOTE ADULT - PROBLEM SELECTOR PLAN 3
In setting of renal failure. SCo2 improved to 16 today. Recommend PO sodium bicarbonate 1300 mg TID. Discontinue bicitra. Monitor SCo2 daily.

## 2022-03-15 NOTE — PROGRESS NOTE ADULT - SUBJECTIVE AND OBJECTIVE BOX
Ra eSo MD    Patient is a 76y old  Male who presents with a chief complaint of Cardiac clearnace for ischemic limb (15 Mar 2022 06:51)        SUBJECTIVE / OVERNIGHT EVENTS: No new events/complaints  TELEMETRY: SR/ST 's      MEDICATIONS  (STANDING):  albuterol/ipratropium for Nebulization 3 milliLiter(s) Nebulizer every 6 hours  atorvastatin 40 milliGRAM(s) Oral at bedtime  budesonide 160 MICROgram(s)/formoterol 4.5 MICROgram(s) Inhaler 2 Puff(s) Inhalation two times a day  carvedilol 12.5 milliGRAM(s) Oral every 12 hours  chlorhexidine 2% Cloths 1 Application(s) Topical <User Schedule>  chlorhexidine 4% Liquid 1 Application(s) Topical <User Schedule>  clopidogrel Tablet 75 milliGRAM(s) Oral daily  doxazosin 4 milliGRAM(s) Oral at bedtime  guaiFENesin  milliGRAM(s) Oral every 12 hours  heparin  Infusion 1300 Unit(s)/Hr (13 mL/Hr) IV Continuous <Continuous>  insulin lispro (ADMELOG) corrective regimen sliding scale   SubCutaneous three times a day before meals  pantoprazole    Tablet 40 milliGRAM(s) Oral before breakfast  polyethylene glycol 3350 17 Gram(s) Oral daily  senna 2 Tablet(s) Oral at bedtime    MEDICATIONS  (PRN):      Vital Signs Last 24 Hrs  T(C): 36.6 (15 Mar 2022 12:17), Max: 36.6 (15 Mar 2022 12:17)  T(F): 97.8 (15 Mar 2022 12:17), Max: 97.8 (15 Mar 2022 12:17)  HR: 92 (15 Mar 2022 12:17) (78 - 97)  BP: 151/79 (15 Mar 2022 12:17) (137/68 - 158/69)  BP(mean): --  RR: 18 (15 Mar 2022 12:17) (18 - 18)  SpO2: 99% (15 Mar 2022 12:17) (95% - 99%)  CAPILLARY BLOOD GLUCOSE      POCT Blood Glucose.: 130 mg/dL (15 Mar 2022 11:43)  POCT Blood Glucose.: 135 mg/dL (15 Mar 2022 08:07)  POCT Blood Glucose.: 114 mg/dL (14 Mar 2022 21:28)  POCT Blood Glucose.: 104 mg/dL (14 Mar 2022 17:21)    I&O's Summary    14 Mar 2022 07:01  -  15 Mar 2022 07:00  --------------------------------------------------------  IN: 104 mL / OUT: 900 mL / NET: -796 mL    15 Mar 2022 07:01  -  15 Mar 2022 12:36  --------------------------------------------------------  IN: 280 mL / OUT: 0 mL / NET: 280 mL          PHYSICAL EXAM  GENERAL: NAD, well-developed  HEAD:  Atraumatic, normocephalic  EYES: EOMI, PERRLA, conjunctiva and sclera clear  CHEST/LUNG: Clear to auscultation anteriorly; no wheezes  HEART: +S1+S2, regular rate and rhythm  ABDOMEN: Soft, nontender, nondistended; bowel sounds present  EXTREMITIES:  R AKA; LLE cold to touch below knee, + edema  PSYCH: AAOx3    LABS:                        10.1   24.84 )-----------( 284      ( 15 Mar 2022 06:08 )             30.4     03-15    150<H>  |  119<H>  |  45<H>  ----------------------------<  117<H>  3.5   |  16<L>  |  2.61<H>    Ca    8.7      15 Mar 2022 06:08  Phos  3.6     03-15  Mg     1.8     03-15      PTT - ( 14 Mar 2022 06:54 )  PTT:64.5 sec      Urinalysis Basic - ( 15 Mar 2022 02:23 )    Color: Light Yellow / Appearance: Slightly Turbid / S.012 / pH: x  Gluc: x / Ketone: Negative  / Bili: Negative / Urobili: Negative   Blood: x / Protein: 30 mg/dL / Nitrite: Negative   Leuk Esterase: Negative / RBC: 51 /hpf / WBC 6 /HPF   Sq Epi: x / Non Sq Epi: 2 /hpf / Bacteria: Negative          RADIOLOGY & ADDITIONAL TESTS:    Imaging Personally Reviewed:  Consultant(s) Notes Reviewed:    Care Discussed with Consultants/Other Providers:

## 2022-03-15 NOTE — PROGRESS NOTE ADULT - ATTENDING COMMENTS
Pre-surgical workup for LLE amputation includes nuclear stress test which was abnormal, thus transferred for cardiac catheterization and evaluation prior to planned BKA. Pending renal improvement/optimization prior to cardiac cath. Appreciate nephrology assistance.    Hank Mckinney MD. MPH. KELLEY.  Cardiovascular Specialist  Department of Cardiology  [facility]  m: [phone]

## 2022-03-15 NOTE — PROGRESS NOTE ADULT - PROBLEM SELECTOR PLAN 2
In setting of free water deficit. SNa at 150 today. Recommend D5W at 80 cc/hr for 12 hours. Encourage Po water intake. Monitor SNa.

## 2022-03-15 NOTE — PHYSICAL THERAPY INITIAL EVALUATION ADULT - PERTINENT HX OF CURRENT PROBLEM, REHAB EVAL
77 yo M w/ pmh of HTN, HLD, PVD, R AKA who was brought by EMS after a mechanical fall NO LOC or head injury and he denies dizziness syncope, admitted @ [facility] due to PNA w/ troponemia, CHF as well as a LE arterial embolism,  LLE CFA and prox SFA occlusions, hypoxic resp failure- 2 day ICU stay. Pt transferred to [facility] for Cardiac clearnace for ischemic limb. Pt experienced multiple hypertensive episodes, one during arterial angiogram, the other overnight.

## 2022-03-16 LAB
ALBUMIN SERPL ELPH-MCNC: 2.5 G/DL — LOW (ref 3.3–5)
ALP SERPL-CCNC: 126 U/L — HIGH (ref 40–120)
ALT FLD-CCNC: 64 U/L — HIGH (ref 10–45)
ANION GAP SERPL CALC-SCNC: 15 MMOL/L — SIGNIFICANT CHANGE UP (ref 5–17)
AST SERPL-CCNC: 114 U/L — HIGH (ref 10–40)
BILIRUB SERPL-MCNC: 0.3 MG/DL — SIGNIFICANT CHANGE UP (ref 0.2–1.2)
BUN SERPL-MCNC: 36 MG/DL — HIGH (ref 7–23)
CALCIUM SERPL-MCNC: 8.3 MG/DL — LOW (ref 8.4–10.5)
CHLORIDE SERPL-SCNC: 117 MMOL/L — HIGH (ref 96–108)
CO2 SERPL-SCNC: 15 MMOL/L — LOW (ref 22–31)
CREAT SERPL-MCNC: 2.34 MG/DL — HIGH (ref 0.5–1.3)
EGFR: 28 ML/MIN/1.73M2 — LOW
GLUCOSE BLDC GLUCOMTR-MCNC: 107 MG/DL — HIGH (ref 70–99)
GLUCOSE BLDC GLUCOMTR-MCNC: 116 MG/DL — HIGH (ref 70–99)
GLUCOSE BLDC GLUCOMTR-MCNC: 136 MG/DL — HIGH (ref 70–99)
GLUCOSE BLDC GLUCOMTR-MCNC: 139 MG/DL — HIGH (ref 70–99)
GLUCOSE SERPL-MCNC: 100 MG/DL — HIGH (ref 70–99)
HCT VFR BLD CALC: 29.5 % — LOW (ref 39–50)
HGB BLD-MCNC: 9.9 G/DL — LOW (ref 13–17)
MCHC RBC-ENTMCNC: 26.4 PG — LOW (ref 27–34)
MCHC RBC-ENTMCNC: 33.6 GM/DL — SIGNIFICANT CHANGE UP (ref 32–36)
MCV RBC AUTO: 78.7 FL — LOW (ref 80–100)
NRBC # BLD: 0 /100 WBCS — SIGNIFICANT CHANGE UP (ref 0–0)
PLATELET # BLD AUTO: 254 K/UL — SIGNIFICANT CHANGE UP (ref 150–400)
POTASSIUM SERPL-MCNC: 3.9 MMOL/L — SIGNIFICANT CHANGE UP (ref 3.5–5.3)
POTASSIUM SERPL-SCNC: 3.9 MMOL/L — SIGNIFICANT CHANGE UP (ref 3.5–5.3)
PROT SERPL-MCNC: 6.4 G/DL — SIGNIFICANT CHANGE UP (ref 6–8.3)
RBC # BLD: 3.75 M/UL — LOW (ref 4.2–5.8)
RBC # FLD: 16 % — HIGH (ref 10.3–14.5)
SODIUM SERPL-SCNC: 147 MMOL/L — HIGH (ref 135–145)
WBC # BLD: 25.69 K/UL — HIGH (ref 3.8–10.5)
WBC # FLD AUTO: 25.69 K/UL — HIGH (ref 3.8–10.5)

## 2022-03-16 PROCEDURE — 99232 SBSQ HOSP IP/OBS MODERATE 35: CPT | Mod: GC

## 2022-03-16 PROCEDURE — 99232 SBSQ HOSP IP/OBS MODERATE 35: CPT

## 2022-03-16 PROCEDURE — 93454 CORONARY ARTERY ANGIO S&I: CPT | Mod: 26

## 2022-03-16 PROCEDURE — 99152 MOD SED SAME PHYS/QHP 5/>YRS: CPT

## 2022-03-16 RX ORDER — HEPARIN SODIUM 5000 [USP'U]/ML
1300 INJECTION INTRAVENOUS; SUBCUTANEOUS
Qty: 25000 | Refills: 0 | Status: DISCONTINUED | OUTPATIENT
Start: 2022-03-16 | End: 2022-03-19

## 2022-03-16 RX ORDER — SODIUM CHLORIDE 9 MG/ML
960 INJECTION, SOLUTION INTRAVENOUS
Refills: 0 | Status: DISCONTINUED | OUTPATIENT
Start: 2022-03-16 | End: 2022-03-17

## 2022-03-16 RX ORDER — HEPARIN SODIUM 5000 [USP'U]/ML
1300 INJECTION INTRAVENOUS; SUBCUTANEOUS
Qty: 25000 | Refills: 0 | Status: DISCONTINUED | OUTPATIENT
Start: 2022-03-16 | End: 2022-03-16

## 2022-03-16 RX ADMIN — ATORVASTATIN CALCIUM 40 MILLIGRAM(S): 80 TABLET, FILM COATED ORAL at 21:20

## 2022-03-16 RX ADMIN — Medication 3 MILLILITER(S): at 13:58

## 2022-03-16 RX ADMIN — Medication 3 MILLILITER(S): at 00:05

## 2022-03-16 RX ADMIN — Medication 3 MILLILITER(S): at 20:23

## 2022-03-16 RX ADMIN — HEPARIN SODIUM 13 UNIT(S)/HR: 5000 INJECTION INTRAVENOUS; SUBCUTANEOUS at 18:21

## 2022-03-16 RX ADMIN — SODIUM CHLORIDE 80 MILLILITER(S): 9 INJECTION, SOLUTION INTRAVENOUS at 21:16

## 2022-03-16 RX ADMIN — BUDESONIDE AND FORMOTEROL FUMARATE DIHYDRATE 2 PUFF(S): 160; 4.5 AEROSOL RESPIRATORY (INHALATION) at 08:51

## 2022-03-16 RX ADMIN — HEPARIN SODIUM 13 UNIT(S)/HR: 5000 INJECTION INTRAVENOUS; SUBCUTANEOUS at 08:50

## 2022-03-16 RX ADMIN — CLOPIDOGREL BISULFATE 75 MILLIGRAM(S): 75 TABLET, FILM COATED ORAL at 13:59

## 2022-03-16 RX ADMIN — HEPARIN SODIUM 13 UNIT(S)/HR: 5000 INJECTION INTRAVENOUS; SUBCUTANEOUS at 21:16

## 2022-03-16 RX ADMIN — Medication 600 MILLIGRAM(S): at 17:18

## 2022-03-16 RX ADMIN — BUDESONIDE AND FORMOTEROL FUMARATE DIHYDRATE 2 PUFF(S): 160; 4.5 AEROSOL RESPIRATORY (INHALATION) at 21:19

## 2022-03-16 RX ADMIN — Medication 4 MILLIGRAM(S): at 21:20

## 2022-03-16 RX ADMIN — CARVEDILOL PHOSPHATE 12.5 MILLIGRAM(S): 80 CAPSULE, EXTENDED RELEASE ORAL at 05:29

## 2022-03-16 RX ADMIN — Medication 600 MILLIGRAM(S): at 05:29

## 2022-03-16 RX ADMIN — Medication 3 MILLILITER(S): at 05:58

## 2022-03-16 RX ADMIN — SODIUM CHLORIDE 80 MILLILITER(S): 9 INJECTION, SOLUTION INTRAVENOUS at 21:56

## 2022-03-16 RX ADMIN — CHLORHEXIDINE GLUCONATE 1 APPLICATION(S): 213 SOLUTION TOPICAL at 06:34

## 2022-03-16 RX ADMIN — CARVEDILOL PHOSPHATE 12.5 MILLIGRAM(S): 80 CAPSULE, EXTENDED RELEASE ORAL at 17:18

## 2022-03-16 RX ADMIN — PANTOPRAZOLE SODIUM 40 MILLIGRAM(S): 20 TABLET, DELAYED RELEASE ORAL at 08:50

## 2022-03-16 RX ADMIN — SODIUM CHLORIDE 80 MILLILITER(S): 9 INJECTION, SOLUTION INTRAVENOUS at 13:08

## 2022-03-16 NOTE — PROGRESS NOTE ADULT - SUBJECTIVE AND OBJECTIVE BOX
Patient seen and examined at bedside.    Overnight Events: No acute events overnight. Denies chest pain or SOB. Endorses pain in lower extremity. Unable to go for cath yesterday but will proceed today      REVIEW OF SYSTEMS:  Constitutional:     [ x] negative [ ] fevers [ ] chills [ ] weight loss [ ] weight gain  HEENT:                  [ x] negative [ ] dry eyes [ ] eye irritation [ ] postnasal drip [ ] nasal congestion  CV:                         [x ] negative  [ ] chest pain [ ] orthopnea [ ] palpitations [ ] murmur  Resp:                     [ x] negative [ ] cough [ ] shortness of breath [ ] dyspnea [ ] wheezing [ ] sputum [ ]hemoptysis  GI:                          [ x] negative [ ] nausea [ ] vomiting [ ] diarrhea [ ] constipation [ ] abd pain [ ] dysphagia   :                        [ x] negative [ ] dysuria [ ] nocturia [ ] hematuria [ ] increased urinary frequency  Musculoskeletal: [ x] negative [ ] back pain [ ] myalgias [ ] arthralgias [ ] fracture  Skin:                       [ x] negative [ ] rash [ ] itch  Neurological:        [ x] negative [ ] headache [ ] dizziness [ ] syncope [ ] weakness [ ] numbness  Psychiatric:           [ x] negative [ ] anxiety [ ] depression  Endocrine:            [ x] negative [ ] diabetes [ ] thyroid problem  Heme/Lymph:      [ x] negative [ ] anemia [ ] bleeding problem  Allergic/Immune: [x ] negative [ ] itchy eyes [ ] nasal discharge [ ] hives [ ] angioedema    [x ] All other systems negative  [ ] Unable to assess ROS due to    Current Meds:  albuterol/ipratropium for Nebulization 3 milliLiter(s) Nebulizer every 6 hours  atorvastatin 40 milliGRAM(s) Oral at bedtime  budesonide 160 MICROgram(s)/formoterol 4.5 MICROgram(s) Inhaler 2 Puff(s) Inhalation two times a day  carvedilol 12.5 milliGRAM(s) Oral every 12 hours  chlorhexidine 2% Cloths 1 Application(s) Topical <User Schedule>  chlorhexidine 4% Liquid 1 Application(s) Topical <User Schedule>  clopidogrel Tablet 75 milliGRAM(s) Oral daily  dextrose 5%. 960 milliLiter(s) IV Continuous <Continuous>  doxazosin 4 milliGRAM(s) Oral at bedtime  guaiFENesin  milliGRAM(s) Oral every 12 hours  heparin  Infusion 1300 Unit(s)/Hr IV Continuous <Continuous>  insulin lispro (ADMELOG) corrective regimen sliding scale   SubCutaneous three times a day before meals  pantoprazole    Tablet 40 milliGRAM(s) Oral before breakfast  polyethylene glycol 3350 17 Gram(s) Oral daily  senna 2 Tablet(s) Oral at bedtime      PAST MEDICAL & SURGICAL HISTORY:  HTN (hypertension)    Hyperlipidemia    PVD (peripheral vascular disease)    S/P BKA (below knee amputation), right        Vitals:  T(F): 98.9 (03-16), Max: 99.4 (03-15)  HR: 80 (03-16) (74 - 92)  BP: 120/65 (03-16) (120/65 - 151/79)  RR: 18 (03-16)  SpO2: 96% (03-16)  I&O's Summary    14 Mar 2022 07:01  -  15 Mar 2022 07:00  --------------------------------------------------------  IN: 104 mL / OUT: 900 mL / NET: -796 mL    15 Mar 2022 07:01  -  16 Mar 2022 06:49  --------------------------------------------------------  IN: 760 mL / OUT: 651 mL / NET: 109 mL        Appearance: No acute distress; well appearing  Eyes: PERRL, EOMI, pink conjunctiva  HEENT: Normal oral mucosa  Cardiovascular: RRR, S1, S2, no murmurs, rubs, or gallops; no edema; no JVD; s/p RLE amputation, LLE cool without palpable pulses   Respiratory: Clear to auscultation bilaterally  Gastrointestinal: soft, non-tender, non-distended with normal bowel sounds  Musculoskeletal: No clubbing; no joint deformity   Neurologic: Non-focal  Lymphatic: No lymphadenopathy  Psychiatry: AAOx3, mood & affect appropriate  Skin: No rashes, ecchymoses, or cyanosis                            10.1   24.84 )-----------( 284      ( 15 Mar 2022 06:08 )             30.4     03-15    150<H>  |  119<H>  |  45<H>  ----------------------------<  117<H>  3.5   |  16<L>  |  2.61<H>    Ca    8.7      15 Mar 2022 06:08  Phos  3.6     03-15  Mg     1.8     03-15    TPro  6.8  /  Alb  2.8<L>  /  TBili  0.3  /  DBili  0.1  /  AST  139<H>  /  ALT  79<H>  /  AlkPhos  151<H>  03-15    PTT - ( 14 Mar 2022 06:54 )  PTT:64.5 sec      Serum Pro-Brain Natriuretic Peptide: 5764 pg/mL (03-11 @ 17:53)             Patient seen and examined at bedside.    Overnight Events: No acute events overnight. Denies chest pain or SOB. Endorses pain in lower extremity. Unable to go for cath yesterday but will proceed today      REVIEW OF SYSTEMS:  Constitutional:     [ x] negative [ ] fevers [ ] chills [ ] weight loss [ ] weight gain  HEENT:                  [ x] negative [ ] dry eyes [ ] eye irritation [ ] postnasal drip [ ] nasal congestion  CV:                         [x ] negative  [ ] chest pain [ ] orthopnea [ ] palpitations [ ] murmur  Resp:                     [ x] negative [ ] cough [ ] shortness of breath [ ] dyspnea [ ] wheezing [ ] sputum [ ]hemoptysis  GI:                          [ x] negative [ ] nausea [ ] vomiting [ ] diarrhea [ ] constipation [ ] abd pain [ ] dysphagia   :                        [ x] negative [ ] dysuria [ ] nocturia [ ] hematuria [ ] increased urinary frequency  Musculoskeletal: [ x] negative [ ] back pain [ ] myalgias [ ] arthralgias [ ] fracture  Skin:                       [ x] negative [ ] rash [ ] itch  Neurological:        [ x] negative [ ] headache [ ] dizziness [ ] syncope [ ] weakness [ ] numbness  Psychiatric:           [ x] negative [ ] anxiety [ ] depression  Endocrine:            [ x] negative [ ] diabetes [ ] thyroid problem  Heme/Lymph:      [ x] negative [ ] anemia [ ] bleeding problem  Allergic/Immune: [x ] negative [ ] itchy eyes [ ] nasal discharge [ ] hives [ ] angioedema    [x ] All other systems negative  [ ] Unable to assess ROS due to    Current Meds:  albuterol/ipratropium for Nebulization 3 milliLiter(s) Nebulizer every 6 hours  atorvastatin 40 milliGRAM(s) Oral at bedtime  budesonide 160 MICROgram(s)/formoterol 4.5 MICROgram(s) Inhaler 2 Puff(s) Inhalation two times a day  carvedilol 12.5 milliGRAM(s) Oral every 12 hours  chlorhexidine 2% Cloths 1 Application(s) Topical <User Schedule>  chlorhexidine 4% Liquid 1 Application(s) Topical <User Schedule>                           clopidogrel Tablet 75 milliGRAM(s) Oral daily  dextrose 5%. 960 milliLiter(s) IV Continuous <Continuous>  doxazosin 4 milliGRAM(s) Oral at bedtime  guaiFENesin  milliGRAM(s) Oral every 12 hours  heparin  Infusion 1300 Unit(s)/Hr IV Continuous <Continuous>  insulin lispro (ADMELOG) corrective regimen sliding scale   SubCutaneous three times a day before meals  pantoprazole    Tablet 40 milliGRAM(s) Oral before breakfast  polyethylene glycol 3350 17 Gram(s) Oral daily  senna 2 Tablet(s) Oral at bedtime      PAST MEDICAL & SURGICAL HISTORY:  HTN (hypertension)    Hyperlipidemia    PVD (peripheral vascular disease)    S/P BKA (below knee amputation), right        Vitals:  T(F): 98.9 (03-16), Max: 99.4 (03-15)  HR: 80 (03-16) (74 - 92)  BP: 120/65 (03-16) (120/65 - 151/79)  RR: 18 (03-16)  SpO2: 96% (03-16)  I&O's Summary    14 Mar 2022 07:01  -  15 Mar 2022 07:00  --------------------------------------------------------  IN: 104 mL / OUT: 900 mL / NET: -796 mL    15 Mar 2022 07:01  -  16 Mar 2022 06:49  --------------------------------------------------------  IN: 760 mL / OUT: 651 mL / NET: 109 mL        Appearance: No acute distress; well appearing  Eyes: PERRL, EOMI, pink conjunctiva  HEENT: Normal oral mucosa  Cardiovascular: RRR, S1, S2, no murmurs, rubs, or gallops; no edema; no JVD; s/p RLE amputation, LLE cool without palpable pulses   Respiratory: Clear to auscultation bilaterally  Gastrointestinal: soft, non-tender, non-distended with normal bowel sounds  Musculoskeletal: No clubbing; no joint deformity   Neurologic: Non-focal  Lymphatic: No lymphadenopathy  Psychiatry: AAOx3, mood & affect appropriate  Skin: No rashes, ecchymoses, or cyanosis                            10.1   24.84 )-----------( 284      ( 15 Mar 2022 06:08 )             30.4     03-15    150<H>  |  119<H>  |  45<H>  ----------------------------<  117<H>  3.5   |  16<L>  |  2.61<H>    Ca    8.7      15 Mar 2022 06:08  Phos  3.6     03-15  Mg     1.8     03-15    TPro  6.8  /  Alb  2.8<L>  /  TBili  0.3  /  DBili  0.1  /  AST  139<H>  /  ALT  79<H>  /  AlkPhos  151<H>  03-15    PTT - ( 14 Mar 2022 06:54 )  PTT:64.5 sec      Serum Pro-Brain Natriuretic Peptide: 5764 pg/mL (03-11 @ 17:53)

## 2022-03-16 NOTE — PROGRESS NOTE ADULT - ASSESSMENT
76M with HTN, HLD, PVD, R AKA who presented to [facility] earlier this month after a mechanical fall from his wheelchair at home. NO LOC or head injury and he denies dizziness syncope. Noted to have sepsis due to pneumonia and treated with Azithromycin and Ceftriaxone for pneumonia.  Developed severe LLE pain due to ischemia.  Plan was for angiogram, however, developed HTN.  Had a positive stress test and Transferred to [facility] on 3/11/2022 for cardiac cath.  No fever, however noted to have worsening leukocytosis.  NO definite diarrhea    Leukocytosis  - agree likely due to ischemic foot  - await surgical intervention after cardiac cath  - f/u blood cultures drawn yesterday  - if diarrhea, check cdiff and start po vancomycin  - if he develops fever or clinically worsens, can start zosyn     BAUDILIO on CKD  - creatinine improving  - renally dose all medications    above communicated with PA [id number]

## 2022-03-16 NOTE — PROGRESS NOTE ADULT - PROBLEM SELECTOR PLAN 3
Baseline unknown but improving  Renal sono with decreased echogenicty c/w parenchymal disease  Hypernatremia- cont D5W

## 2022-03-16 NOTE — PROGRESS NOTE ADULT - SUBJECTIVE AND OBJECTIVE BOX
Ra Seo MD    Patient is a 76y old  Male who presents with a chief complaint of Cardiac clearnace for ischemic limb (16 Mar 2022 06:49)        SUBJECTIVE / OVERNIGHT EVENTS: No new events- continued pain in LLE  TELEMETRY: SR 70-90's, PAT to 159 for 5 seconds, PACs, PVCs      MEDICATIONS  (STANDING):  albuterol/ipratropium for Nebulization 3 milliLiter(s) Nebulizer every 6 hours  atorvastatin 40 milliGRAM(s) Oral at bedtime  budesonide 160 MICROgram(s)/formoterol 4.5 MICROgram(s) Inhaler 2 Puff(s) Inhalation two times a day  carvedilol 12.5 milliGRAM(s) Oral every 12 hours  chlorhexidine 2% Cloths 1 Application(s) Topical <User Schedule>  chlorhexidine 4% Liquid 1 Application(s) Topical <User Schedule>  clopidogrel Tablet 75 milliGRAM(s) Oral daily  dextrose 5%. 960 milliLiter(s) (80 mL/Hr) IV Continuous <Continuous>  doxazosin 4 milliGRAM(s) Oral at bedtime  guaiFENesin  milliGRAM(s) Oral every 12 hours  heparin  Infusion 1300 Unit(s)/Hr (13 mL/Hr) IV Continuous <Continuous>  insulin lispro (ADMELOG) corrective regimen sliding scale   SubCutaneous three times a day before meals  pantoprazole    Tablet 40 milliGRAM(s) Oral before breakfast  polyethylene glycol 3350 17 Gram(s) Oral daily  senna 2 Tablet(s) Oral at bedtime    MEDICATIONS  (PRN):      Vital Signs Last 24 Hrs  T(C): 36.7 (16 Mar 2022 09:04), Max: 37.4 (15 Mar 2022 20:08)  T(F): 98 (16 Mar 2022 09:04), Max: 99.4 (15 Mar 2022 20:08)  HR: 79 (16 Mar 2022 11:10) (74 - 92)  BP: 151/71 (16 Mar 2022 11:10) (120/65 - 151/79)  BP(mean): --  RR: 16 (16 Mar 2022 11:10) (14 - 18)  SpO2: 98% (16 Mar 2022 11:10) (96% - 99%)  CAPILLARY BLOOD GLUCOSE      POCT Blood Glucose.: 107 mg/dL (16 Mar 2022 07:47)  POCT Blood Glucose.: 129 mg/dL (15 Mar 2022 22:15)  POCT Blood Glucose.: 116 mg/dL (15 Mar 2022 17:04)    I&O's Summary    15 Mar 2022 07:01  -  16 Mar 2022 07:00  --------------------------------------------------------  IN: 760 mL / OUT: 651 mL / NET: 109 mL    16 Mar 2022 07:01  -  16 Mar 2022 12:01  --------------------------------------------------------  IN: 120 mL / OUT: 0 mL / NET: 120 mL          PHYSICAL EXAM  GENERAL: NAD, well-developed  HEAD:  Atraumatic, normocephalic  EYES: EOMI, PERRLA, conjunctiva and sclera clear  CHEST/LUNG: Clear to auscultation anteriorly; no wheezes  HEART: +S1+S2, regular rate and rhythm  ABDOMEN: Soft, nontender, nondistended; bowel sounds present  EXTREMITIES:  R AKA; LLE cold to touch below knee, + edema  PSYCH: AAOx3      LABS:                        9.9    25.69 )-----------( 254      ( 16 Mar 2022 06:48 )             29.5     03-16    147<H>  |  117<H>  |  36<H>  ----------------------------<  100<H>  3.9   |  15<L>  |  2.34<H>    Ca    8.3<L>      16 Mar 2022 06:48  Phos  3.6     03-15  Mg     1.8     03-15    TPro  6.4  /  Alb  2.5<L>  /  TBili  0.3  /  DBili  x   /  AST  114<H>  /  ALT  64<H>  /  AlkPhos  126<H>  03-16          Urinalysis Basic - ( 15 Mar 2022 02:23 )    Color: Light Yellow / Appearance: Slightly Turbid / S.012 / pH: x  Gluc: x / Ketone: Negative  / Bili: Negative / Urobili: Negative   Blood: x / Protein: 30 mg/dL / Nitrite: Negative   Leuk Esterase: Negative / RBC: 51 /hpf / WBC 6 /HPF   Sq Epi: x / Non Sq Epi: 2 /hpf / Bacteria: Negative          RADIOLOGY & ADDITIONAL TESTS:    Imaging Personally Reviewed:  Consultant(s) Notes Reviewed:    Care Discussed with Consultants/Other Providers:

## 2022-03-16 NOTE — PROGRESS NOTE ADULT - PROBLEM SELECTOR PLAN 3
In setting of renal failure. SCo2 improved to 15 today. Recommend PO sodium bicarbonate 1300 mg TID. Monitor SCo2 daily.

## 2022-03-16 NOTE — PROGRESS NOTE ADULT - ATTENDING COMMENTS
I have seen this patient with the fellow and agree with their assessment and plan. I was physically present for significant portions of the evaluation and management (E/M) service provided.  I agree with the above history, physical, and plan which I have reviewed and edited where appropriate.    Cont to diagnostic cardiac cath  Na improving  Agree with Elvira aviles oral tabs    Renea Pereira MD  Pager   Office     Contact me directly via Microsoft Teams     (After 5 pm or on weekends please page the on-call fellow/attending, can check [email or web address] for schedule. Login is jamilah [email or web address], schedule under [facility] medicine, psych, derm)

## 2022-03-16 NOTE — PROGRESS NOTE ADULT - SUBJECTIVE AND OBJECTIVE BOX
[facility] DIVISION OF KIDNEY DISEASES AND HYPERTENSION -- FOLLOW UP NOTE  --------------------------------------------------------------------------------  Chief Complaint: BAUDILIO on CKD    24 hour events/subjective: Pt. seen and examined not in distress. Scr stable at 2.34.  Plan for [facility] today noted.       PAST HISTORY  --------------------------------------------------------------------------------  No significant changes to PMH, PSH, FHx, SHx, unless otherwise noted    ALLERGIES & MEDICATIONS  --------------------------------------------------------------------------------  Allergies    No Known Allergies    Intolerances      Standing Inpatient Medications  albuterol/ipratropium for Nebulization 3 milliLiter(s) Nebulizer every 6 hours  atorvastatin 40 milliGRAM(s) Oral at bedtime  budesonide 160 MICROgram(s)/formoterol 4.5 MICROgram(s) Inhaler 2 Puff(s) Inhalation two times a day  carvedilol 12.5 milliGRAM(s) Oral every 12 hours  chlorhexidine 2% Cloths 1 Application(s) Topical <User Schedule>  chlorhexidine 4% Liquid 1 Application(s) Topical <User Schedule>  clopidogrel Tablet 75 milliGRAM(s) Oral daily  dextrose 5%. 960 milliLiter(s) IV Continuous <Continuous>  dextrose 5%. 960 milliLiter(s) IV Continuous <Continuous>  doxazosin 4 milliGRAM(s) Oral at bedtime  guaiFENesin  milliGRAM(s) Oral every 12 hours  heparin  Infusion 1300 Unit(s)/Hr IV Continuous <Continuous>  insulin lispro (ADMELOG) corrective regimen sliding scale   SubCutaneous three times a day before meals  pantoprazole    Tablet 40 milliGRAM(s) Oral before breakfast  polyethylene glycol 3350 17 Gram(s) Oral daily  senna 2 Tablet(s) Oral at bedtime    PRN Inpatient Medications      REVIEW OF SYSTEMS  --------------------------------------------------------------------------------  Gen: weakness+  Skin: No rashes  Head/Eyes/Ears: Normal hearing,   Respiratory: No dyspnea, cough  CV: No chest pain  GI: No abdominal pain, diarrhea  : as per HPI,   MSK: No  edema    All other systems were reviewed and are negative, except as noted.    VITALS/PHYSICAL EXAM  --------------------------------------------------------------------------------  T(C): 37.3 (03-16-22 @ 13:05), Max: 37.4 (03-15-22 @ 20:08)  HR: 82 (03-16-22 @ 13:50) (74 - 83)  BP: 167/69 (03-16-22 @ 13:50) (120/65 - 167/69)  RR: 18 (03-16-22 @ 13:50) (14 - 22)  SpO2: 95% (03-16-22 @ 13:35) (94% - 99%)  Wt(kg): --      03-15-22 @ 07:01  -  03-16-22 @ 07:00  --------------------------------------------------------  IN: 760 mL / OUT: 651 mL / NET: 109 mL    03-16-22 @ 07:01  -  03-16-22 @ 14:25  --------------------------------------------------------  IN: 120 mL / OUT: 0 mL / NET: 120 mL      Physical Exam:  	Gen: NAD  	HEENT: MMM  	Pulm: CTA B/L  	CV: S1S2  	Abd: Soft, +BS   	Ext: R AKA, NO edema LE  	Neuro: Awake, alert  	Skin: Warm and dry    LABS/STUDIES  --------------------------------------------------------------------------------              9.9    25.69 >-----------<  254      [03-16-22 @ 06:48]              29.5     147  |  117  |  36  ----------------------------<  100      [03-16-22 @ 06:48]  3.9   |  15  |  2.34        Ca     8.3     [03-16-22 @ 06:48]      Mg     1.8     [03-15-22 @ 06:08]      Phos  3.6     [03-15-22 @ 06:08]    Creatinine Trend:  SCr 2.34 [03-16 @ 06:48]  SCr 2.61 [03-15 @ 06:08]  SCr 3.06 [03-14 @ 06:54]  SCr 3.20 [03-13 @ 06:32]  SCr 3.78 [03-12 @ 04:33]    Urinalysis - [03-15-22 @ 02:23]      Color Light Yellow / Appearance Slightly Turbid / SG 1.012 / pH 5.5      Gluc Negative / Ketone Negative  / Bili Negative / Urobili Negative       Blood Large / Protein 30 mg/dL / Leuk Est Negative / Nitrite Negative      RBC 51 / WBC 6 / Hyaline 7 / Gran 2 / Sq Epi  / Non Sq Epi 2 / Bacteria Negative    Urine Creatinine 68      [03-15-22 @ 02:24]  Urine Sodium 68      [03-15-22 @ 02:24]  Urine Potassium 21      [03-15-22 @ 02:24]  Urine Chloride 64      [03-15-22 @ 02:24]  Urine Osmolality 365      [03-15-22 @ 02:24]    HCV 0.16, Nonreact      [03-12-22 @ 00:43]

## 2022-03-16 NOTE — PROGRESS NOTE ADULT - PROBLEM SELECTOR PLAN 2
Worsening  Completed a course of abx at OSH for PNA- currently off abx  Afebrile, nontoxic appearing  UA negative for UTI  Repeat CXR with similar L infiltrate and small L effusion  ID eval appreciated- concern that LLE may be source- blood cultures sent- start Zosyn if febrile

## 2022-03-16 NOTE — PROGRESS NOTE ADULT - PROBLEM SELECTOR PLAN 2
In setting of free water deficit. SNa at 147 today. Recommend D5W at 80 cc/hr for 10 hours. Encourage Po water intake. Monitor SNa.

## 2022-03-16 NOTE — PROGRESS NOTE ADULT - PROBLEM SELECTOR PLAN 7
Seen by speech and swallow  On Pureed diet
Seen by speech and swallow  On Pureed diet
Seen by speech and swallow  Start Pureed diet
Unclear baseline. Possibly 2/2 Sepsis and CHF exacerbation  - Cr improved today  - Renally dose medications  - Trend BMP
Unclear baseline. Possibly 2/2 Sepsis and CHF exacerbation  - Cr improved today  - Renally dose medications  - Trend BMP

## 2022-03-16 NOTE — PROGRESS NOTE ADULT - ATTENDING COMMENTS
Pre-surgical workup for LLE amputation included nuclear stress test which was abnormal at OSH, thus transferred for cardiac catheterization and evaluation prior to planned BKA but delayed due to acute on chronic BAUDILIO. s/p LHC with severe CAD;  of LCx and RCA with good collateral blood flow and moderate disease mLAD; no intervention performed. Recommend medical management with clopidogrel 75 mg daily, carvedilol 12.5 mg BID, atorvastatin 40 mg nightly. Suggest starting hydralazine 25 mg q8h for improved BP control and afterload reduction given reduced EF. He is optimized and can proceed with LLE amputation as per vascular/surgery. RCRI of 4 (ischemic heart disease, insulin, CHF) placing him at >15% risk of cardiac death/events within 30 days but no further testing or intervention can reduce this risk. Please call with questions.     Hank Mckinney MD, MPH, KELLEY, RPVI, [facility]  Inpatient Cardiovascular Specialist; [place] [facility], [facility] ([facility])  ; [employer] [facility]  message: [employer] Manz[employer] [phone] or Microsoft Teams (text preferred and/or call)  email: [email or web address] on-service contact/call information, go to [email or web address] and use "[email or web address]" to login.  Outpatient Cardiology appointments, call  [phone] to arrange with a colleague; I do not have outpatient Cardiology clinic.

## 2022-03-16 NOTE — PROGRESS NOTE ADULT - PROBLEM SELECTOR PLAN 1
BAUDILIO vs CKD vs BAUDILIO on CKD. Pt with no prior baseline Scr.   Baseline unknown. No prior labs on [facility].   SCr 3.06 on 3/11/22 and improved to 2.34. Pt. with no prior CKD work up.  Plan for [facility] noted. Pt has a Stevan's score of 17 which imparts a 57% risk of developing post-PCI JULIO & 12.6% risk of post-PCI JULIO requiring dialysis. Continue to ACE, ARB, diuretics prior to cath. Monitor labs and urine output. Avoid NSAIDs, ACEI/ARBS, RCA and nephrotoxins. Dose medications as per eGFR.

## 2022-03-16 NOTE — PROGRESS NOTE ADULT - ASSESSMENT
76 year old man with HTN, PVD, R AKA brought by EMS to [facility] after a mechanical fall from his wheelchair found exacerbation of heart failure, reduced EF 45%, apparent acute on chronic systolic heart failure and has acute LLE arterial occlusions and attempted peripheral angiogram was unsuccessful. Patient was evaluated for LLE BKA and had abnormal nuclear stress test, thus transferred for cardiac catheterization and evaluation prior to planned BKA. Maintained on heparin infusion, Vascular surgery evaluating. Seek IV diuresis. Coronary angiography planned tomorrow, has advanced CKD and needs optimization.    TTE 3/12- EF 35%, mild MR, moderate segmental LV dysfunction. IL, basal inferior, basal septal are akinetic. AL and apical walls are hypokinetic.   -appreciate renal recommendations regarding CKD and potential worsening post cath  - Continue coreg 12.5 mg bid   - Continue heparin gtt for CFA and SFA occlusions  - agree with plavix  - high intensity statin  - L CFA and SFA occlusions w unsuccessful angio. Recommending L BKA possibly AKA per vascular  - vascular aware, unclear if will return to [facility] for surgery, appreciate recs  -plan for LHC today (was unable to have LHC yesterday). Further risk stratification pending C 76 year old man with HTN, PVD, R AKA brought by EMS to [facility] after a mechanical fall from his wheelchair found exacerbation of heart failure, reduced EF 45%, apparent acute on chronic systolic heart failure and has acute LLE arterial occlusions and attempted peripheral angiogram was unsuccessful. Patient was evaluated for LLE BKA and had abnormal nuclear stress test, thus transferred for cardiac catheterization and evaluation prior to planned BKA. Maintained on heparin infusion, Vascular surgery evaluating. Seek IV diuresis. Coronary angiography planned tomorrow, has advanced CKD and needs optimization.    TTE 3/12- EF 35%, mild MR, moderate segmental LV dysfunction. IL, basal inferior, basal septal are akinetic. AL and apical walls are hypokinetic.   -appreciate renal recommendations regarding CKD and potential worsening post cath  - Continue coreg 12.5 mg bid   - Continue heparin gtt for CFA and SFA occlusions  - agree with plavix  - high intensity statin  - L CFA and SFA occlusions w unsuccessful angio. Recommending L BKA possibly AKA per vascular  - vascular aware, unclear if will return to [facility] for surgery, appreciate recs  [facility] with  of LCx with collaterals,  of RCA with collaterals,  of D1, 70% mid LAD . No intervention was performed. Continue medical management of CAD      Pre-op risk assessment for L BKA vs AKA. RCRI of 5 (ischemic heart disease, elevated risk surgery, Cr>2, and requiring insulin, CHF) placing him at at least 15% risk of cardiac death/events at 30 days. Presume this risk is much higher for this patient given his significant comorbidities and vascular disease and coronary artery disease that is not amendable to intervention. At this time, no further cardiac work up is necessary prior to his vascular surgery as his CAD is chronic and will require lifelong medical management.

## 2022-03-16 NOTE — PROGRESS NOTE ADULT - SUBJECTIVE AND OBJECTIVE BOX
Patient is a 76y old  Male who presents with a chief complaint of Cardiac clearance for ischemic limb (15 Mar 2022 13:34)    f/u leukocytosis    Interval History/ROS:  no fever or diarrhea.  still having significant pain L leg.  s/p cardiac cath earlier today.  no abdominal pain.  no dysuria.  Remainder of ROS otherwise negative.    PAST MEDICAL & SURGICAL HISTORY:  HTN (hypertension)  Hyperlipidemia  PVD (peripheral vascular disease)  S/P BKA (below knee amputation), right    Allergies  No Known Allergies    ANTIMICROBIALS:  none    MEDICATIONS  (STANDING):  albuterol/ipratropium for Nebulization 3 every 6 hours  atorvastatin 40 at bedtime  budesonide 160 MICROgram(s)/formoterol 4.5 MICROgram(s) Inhaler 2 two times a day  carvedilol 12.5 every 12 hours  clopidogrel Tablet 75 daily  doxazosin 4 at bedtime  guaiFENesin  every 12 hours  heparin  Infusion 1300 <Continuous>  insulin lispro (ADMELOG) corrective regimen sliding scale  three times a day before meals  pantoprazole    Tablet 40 before breakfast  polyethylene glycol 3350 17 daily  senna 2 at bedtime    Vital Signs Last 24 Hrs  T(F): 99.2 (22 @ 13:05), Max: 99.4 (03-15-22 @ 20:08)  HR: 82 (22 @ 13:50)  BP: 167/69 (22 @ 13:50)  RR: 18 (22 @ 13:50)  SpO2: 95% (22 @ 13:35) (94% - 99%)    PHYSICAL EXAMINATION:  generally non-toxic  Head/Eyes:  no icterus  ENT:  neck supple  Cardiac:  S1, S2  Pulmonary:  coarse rhochi  Abdomen:  soft and non-tender  MSK:  R AKA, stump okay; L leg with cold foot; dry early gangrenous changes; no drainage; no malodor  Neuro:  awake and alert, decreased sensation LLE  Psych:  appropriate affect  Vascular:  L hand PIV without phlebitis  Skin:  No rash                                9.9    25.69 )-----------( 254      ( 16 Mar 2022 06:48 )             29.5 03-16    147  |  117  |  36  ----------------------------<  100  3.9   |  15  |  2.34  Ca    8.3      16 Mar 2022 06:48Phos  3.6     -15Mg     1.8     -15  TPro  6.4  /  Alb  2.5  /  TBili  0.3  /  DBili  x   /  AST  114  /  ALT  64  /  AlkPhos  126      WBC Count: 25.69 (22 @ 06:48)  WBC Count: 24.84 (03-15-22 @ 06:08)  WBC Count: 21.47 (22 @ 06:54)  WBC Count: 17.50 (22 @ 06:32)  WBC Count: 16.44 (22 @ 04:33)  WBC Count: 16.38 (22 @ 17:53)    Urinalysis Basic - ( 15 Mar 2022 02:23 )  Color: Light Yellow / Appearance: Slightly Turbid / S.012 / pH: x  Gluc: x / Ketone: Negative  / Bili: Negative / Urobili: Negative   Blood: x / Protein: 30 mg/dL / Nitrite: Negative   Leuk Esterase: Negative / RBC: 51 /hpf / WBC 6 /HPF   Sq Epi: x / Non Sq Epi: 2 /hpf / Bacteria: Negative    MICROBIOLOGY:  3/15 BC pending x2    RADIOLOGY:  imaging below personally reviewed and agree with findings    Xray Chest 1 View- PORTABLE-Routine (Xray Chest 1 View- PORTABLE-Routine .) (22 @ 17:33) >  IMPRESSION:  Similar left infiltrate. Smaller left effusion.    Xray Chest 1 View AP/PA (22 @ 18:43) >  IMPRESSION:  Bibasilar opacities may represent atelectasis and/or layering pleural effusion.        ECHO:  TTE with Doppler (w/Cont) (22 @ 08:43) >  Conclusions:  1. Tethered mitral valve leaflets with normal opening. Mild mitral regurgitation.  2. Aortic valve not well visualized; appears to be a calcified trileaflet valve with normal opening. No aortic valve regurgitation seen.  3. Endocardial visualization enhanced with intravenous injection of Ultrasonic Enhancing Agent (Definity).  Moderate segmental left ventricular systolic dysfunction.  The inferolateral, basal inferior, and basal septal walls are akinetic. The anterolateral and apical walls are hypokinetic. No left ventricular thrombus.  4. Normal right ventricular size and function.  5. Trace pericardial effusion.  *** No previous Echo exam.

## 2022-03-17 LAB
ANION GAP SERPL CALC-SCNC: 16 MMOL/L — SIGNIFICANT CHANGE UP (ref 5–17)
APTT BLD: 51.4 SEC — HIGH (ref 27.5–35.5)
BUN SERPL-MCNC: 29 MG/DL — HIGH (ref 7–23)
CALCIUM SERPL-MCNC: 8.3 MG/DL — LOW (ref 8.4–10.5)
CHLORIDE SERPL-SCNC: 111 MMOL/L — HIGH (ref 96–108)
CO2 SERPL-SCNC: 15 MMOL/L — LOW (ref 22–31)
CREAT SERPL-MCNC: 2.14 MG/DL — HIGH (ref 0.5–1.3)
EGFR: 32 ML/MIN/1.73M2 — LOW
GLUCOSE BLDC GLUCOMTR-MCNC: 112 MG/DL — HIGH (ref 70–99)
GLUCOSE BLDC GLUCOMTR-MCNC: 112 MG/DL — HIGH (ref 70–99)
GLUCOSE BLDC GLUCOMTR-MCNC: 125 MG/DL — HIGH (ref 70–99)
GLUCOSE BLDC GLUCOMTR-MCNC: 130 MG/DL — HIGH (ref 70–99)
GLUCOSE SERPL-MCNC: 94 MG/DL — SIGNIFICANT CHANGE UP (ref 70–99)
HCT VFR BLD CALC: 28.4 % — LOW (ref 39–50)
HGB BLD-MCNC: 9.6 G/DL — LOW (ref 13–17)
MAGNESIUM SERPL-MCNC: 1.4 MG/DL — LOW (ref 1.6–2.6)
MCHC RBC-ENTMCNC: 25.9 PG — LOW (ref 27–34)
MCHC RBC-ENTMCNC: 33.8 GM/DL — SIGNIFICANT CHANGE UP (ref 32–36)
MCV RBC AUTO: 76.8 FL — LOW (ref 80–100)
NRBC # BLD: 0 /100 WBCS — SIGNIFICANT CHANGE UP (ref 0–0)
PHOSPHATE SERPL-MCNC: 3.4 MG/DL — SIGNIFICANT CHANGE UP (ref 2.5–4.5)
PLATELET # BLD AUTO: 243 K/UL — SIGNIFICANT CHANGE UP (ref 150–400)
POTASSIUM SERPL-MCNC: 3.6 MMOL/L — SIGNIFICANT CHANGE UP (ref 3.5–5.3)
POTASSIUM SERPL-SCNC: 3.6 MMOL/L — SIGNIFICANT CHANGE UP (ref 3.5–5.3)
RBC # BLD: 3.7 M/UL — LOW (ref 4.2–5.8)
RBC # FLD: 16.1 % — HIGH (ref 10.3–14.5)
SODIUM SERPL-SCNC: 142 MMOL/L — SIGNIFICANT CHANGE UP (ref 135–145)
WBC # BLD: 20.19 K/UL — HIGH (ref 3.8–10.5)
WBC # FLD AUTO: 20.19 K/UL — HIGH (ref 3.8–10.5)

## 2022-03-17 PROCEDURE — 99232 SBSQ HOSP IP/OBS MODERATE 35: CPT

## 2022-03-17 RX ORDER — SODIUM BICARBONATE 1 MEQ/ML
1300 SYRINGE (ML) INTRAVENOUS THREE TIMES A DAY
Refills: 0 | Status: DISCONTINUED | OUTPATIENT
Start: 2022-03-17 | End: 2022-04-01

## 2022-03-17 RX ORDER — MAGNESIUM SULFATE 500 MG/ML
2 VIAL (ML) INJECTION ONCE
Refills: 0 | Status: COMPLETED | OUTPATIENT
Start: 2022-03-17 | End: 2022-03-17

## 2022-03-17 RX ADMIN — Medication 600 MILLIGRAM(S): at 18:24

## 2022-03-17 RX ADMIN — CLOPIDOGREL BISULFATE 75 MILLIGRAM(S): 75 TABLET, FILM COATED ORAL at 13:08

## 2022-03-17 RX ADMIN — CARVEDILOL PHOSPHATE 12.5 MILLIGRAM(S): 80 CAPSULE, EXTENDED RELEASE ORAL at 18:23

## 2022-03-17 RX ADMIN — BUDESONIDE AND FORMOTEROL FUMARATE DIHYDRATE 2 PUFF(S): 160; 4.5 AEROSOL RESPIRATORY (INHALATION) at 08:45

## 2022-03-17 RX ADMIN — ATORVASTATIN CALCIUM 40 MILLIGRAM(S): 80 TABLET, FILM COATED ORAL at 21:32

## 2022-03-17 RX ADMIN — PANTOPRAZOLE SODIUM 40 MILLIGRAM(S): 20 TABLET, DELAYED RELEASE ORAL at 08:37

## 2022-03-17 RX ADMIN — Medication 3 MILLILITER(S): at 05:17

## 2022-03-17 RX ADMIN — CARVEDILOL PHOSPHATE 12.5 MILLIGRAM(S): 80 CAPSULE, EXTENDED RELEASE ORAL at 05:18

## 2022-03-17 RX ADMIN — Medication 3 MILLILITER(S): at 18:23

## 2022-03-17 RX ADMIN — Medication 1300 MILLIGRAM(S): at 18:26

## 2022-03-17 RX ADMIN — CHLORHEXIDINE GLUCONATE 1 APPLICATION(S): 213 SOLUTION TOPICAL at 05:18

## 2022-03-17 RX ADMIN — Medication 3 MILLILITER(S): at 00:09

## 2022-03-17 RX ADMIN — BUDESONIDE AND FORMOTEROL FUMARATE DIHYDRATE 2 PUFF(S): 160; 4.5 AEROSOL RESPIRATORY (INHALATION) at 20:10

## 2022-03-17 RX ADMIN — Medication 4 MILLIGRAM(S): at 21:32

## 2022-03-17 RX ADMIN — Medication 1300 MILLIGRAM(S): at 23:00

## 2022-03-17 RX ADMIN — Medication 3 MILLILITER(S): at 12:58

## 2022-03-17 RX ADMIN — Medication 25 GRAM(S): at 12:58

## 2022-03-17 RX ADMIN — CHLORHEXIDINE GLUCONATE 1 APPLICATION(S): 213 SOLUTION TOPICAL at 06:49

## 2022-03-17 RX ADMIN — HEPARIN SODIUM 13 UNIT(S)/HR: 5000 INJECTION INTRAVENOUS; SUBCUTANEOUS at 20:10

## 2022-03-17 RX ADMIN — Medication 600 MILLIGRAM(S): at 05:17

## 2022-03-17 NOTE — PROGRESS NOTE ADULT - SUBJECTIVE AND OBJECTIVE BOX
Patient is a 76y old  Male who presents with a chief complaint of Cardiac clearance for ischemic limb (15 Mar 2022 13:34)    f/u leukocytosis    Interval History/ROS:  cath noted.  no fever.  leukocytosis better.  still with significant pain L leg.  no abdominal pain.  no dysuria.  Remainder of ROS otherwise negative.    PAST MEDICAL & SURGICAL HISTORY:  HTN (hypertension)  Hyperlipidemia  PVD (peripheral vascular disease)  S/P BKA (below knee amputation), right    Allergies  No Known Allergies    ANTIMICROBIALS:  none    MEDICATIONS  (STANDING):  albuterol/ipratropium for Nebulization 3 every 6 hours  atorvastatin 40 at bedtime  budesonide 160 MICROgram(s)/formoterol 4.5 MICROgram(s) Inhaler 2 two times a day  carvedilol 12.5 every 12 hours  clopidogrel Tablet 75 daily  doxazosin 4 at bedtime  guaiFENesin  every 12 hours  heparin  Infusion 1300 <Continuous>  insulin lispro (ADMELOG) corrective regimen sliding scale  three times a day before meals  pantoprazole    Tablet 40 before breakfast    Vital Signs Last 24 Hrs  T(F): 99 (22 @ 11:24), Max: 99.6 (22 @ 20:27)  HR: 67 (22 @ 11:24)  BP: 154/62 (22 @ 11:24)  RR: 18 (22 @ 11:24)  SpO2: 99% (22 @ 11:24) (95% - 99%)    PHYSICAL EXAMINATION:  generally non-toxic  Head/Eyes:  no icterus  ENT:  neck supple  Cardiac:  S1, S2  Pulmonary:  coarse rhonchi  Abdomen:  soft and non-tender  MSK:  R AKA, stump okay; L leg with cold foot; dry early gangrenous changes; no drainage; no malodor  Neuro:  awake and alert, decreased sensation LLE  Psych:  appropriate affect  Vascular:  L hand PIV without phlebitis  Skin:  No rash                                            9.6    20.19 )-----------( 243      ( 17 Mar 2022 06:40 )             28.4     142  |  111  |  29  ----------------------------<  94  3.6   |  15  |  2.14  Ca    8.3      17 Mar 2022 06:40Phos  3.4     Mg     1.4       TPro  6.4  /  Alb  2.5  /  TBili  0.3  /  DBili  x   /  AST  114  /  ALT  64  /  AlkPhos  126      WBC Count: 20.19 (22 @ 06:40)  WBC Count: 25.69 (22 @ 06:48)  WBC Count: 24.84 (03-15-22 @ 06:08)    Urinalysis Basic - ( 15 Mar 2022 02:23 )  Color: Light Yellow / Appearance: Slightly Turbid / S.012 / pH: x  Gluc: x / Ketone: Negative  / Bili: Negative / Urobili: Negative   Blood: x / Protein: 30 mg/dL / Nitrite: Negative   Leuk Esterase: Negative / RBC: 51 /hpf / WBC 6 /HPF   Sq Epi: x / Non Sq Epi: 2 /hpf / Bacteria: Negative    MICROBIOLOGY:  Culture - Blood (collected 03-15-22 @ 22:12)  Source: .Blood Blood  Preliminary Report (22 @ 23:01):    No growth to date.    Culture - Blood (collected 03-15-22 @ 22:12)  Source: .Blood Blood  Preliminary Report (22 @ 23:01):    No growth to date.    RADIOLOGY:  imaging below personally reviewed and agree with findings    Xray Chest 1 View- PORTABLE-Routine (Xray Chest 1 View- PORTABLE-Routine .) (22 @ 17:33) >  IMPRESSION:  Similar left infiltrate. Smaller left effusion.    Xray Chest 1 View AP/PA (22 @ 18:43) >  IMPRESSION:  Bibasilar opacities may represent atelectasis and/or layering pleural effusion.        ECHO:  TTE with Doppler (w/Cont) (22 @ 08:43) >  Conclusions:  1. Tethered mitral valve leaflets with normal opening. Mild mitral regurgitation.  2. Aortic valve not well visualized; appears to be a calcified trileaflet valve with normal opening. No aortic valve regurgitation seen.  3. Endocardial visualization enhanced with intravenous injection of Ultrasonic Enhancing Agent (Definity).  Moderate segmental left ventricular systolic dysfunction.  The inferolateral, basal inferior, and basal septal walls are akinetic. The anterolateral and apical walls are hypokinetic. No left ventricular thrombus.  4. Normal right ventricular size and function.  5. Trace pericardial effusion.  *** No previous Echo exam.

## 2022-03-17 NOTE — PROGRESS NOTE ADULT - ASSESSMENT
76M with HTN, HLD, PVD, R AKA who presented to [facility] earlier this month after a mechanical fall from his wheelchair at home. NO LOC or head injury and he denies dizziness syncope. Noted to have sepsis due to pneumonia and treated with Azithromycin and Ceftriaxone for pneumonia.  Developed severe LLE pain due to ischemia.  Plan was for angiogram, however, developed HTN.  Had a positive stress test and Transferred to [facility] on 3/11/2022 for cardiac cath.  No fever, however noted to have worsening leukocytosis.  NO definite diarrhea    Leukocytosis  - agree likely due to ischemic foot  - better today  - stable off antibiotics  - blood cultures negative  - for transfer back to [facility] for BKA  - if diarrhea, check cdiff and start po vancomycin  - if he develops fever or clinically worsens, can start zosyn     BAUDILIO on CKD  - creatinine improving  - renally dose all medications

## 2022-03-17 NOTE — PROGRESS NOTE ADULT - SUBJECTIVE AND OBJECTIVE BOX
Ra Seo MD    Patient is a 76y old  Male who presents with a chief complaint of Cardiac clearance for ischemic limb (16 Mar 2022 15:25)        SUBJECTIVE / OVERNIGHT EVENTS: RLE pain. Denies CP/SOB  TELEMETRY: SR 60-70's, PAT to 160 for 2 seconds, PACs, PVCs      MEDICATIONS  (STANDING):  albuterol/ipratropium for Nebulization 3 milliLiter(s) Nebulizer every 6 hours  atorvastatin 40 milliGRAM(s) Oral at bedtime  budesonide 160 MICROgram(s)/formoterol 4.5 MICROgram(s) Inhaler 2 Puff(s) Inhalation two times a day  carvedilol 12.5 milliGRAM(s) Oral every 12 hours  chlorhexidine 2% Cloths 1 Application(s) Topical <User Schedule>  chlorhexidine 4% Liquid 1 Application(s) Topical <User Schedule>  clopidogrel Tablet 75 milliGRAM(s) Oral daily  dextrose 5%. 960 milliLiter(s) (80 mL/Hr) IV Continuous <Continuous>  dextrose 5%. 960 milliLiter(s) (80 mL/Hr) IV Continuous <Continuous>  doxazosin 4 milliGRAM(s) Oral at bedtime  guaiFENesin  milliGRAM(s) Oral every 12 hours  heparin  Infusion 1300 Unit(s)/Hr (13 mL/Hr) IV Continuous <Continuous>  insulin lispro (ADMELOG) corrective regimen sliding scale   SubCutaneous three times a day before meals  magnesium sulfate  IVPB 2 Gram(s) IV Intermittent once  pantoprazole    Tablet 40 milliGRAM(s) Oral before breakfast  sodium bicarbonate 1300 milliGRAM(s) Oral three times a day    MEDICATIONS  (PRN):      Vital Signs Last 24 Hrs  T(C): 37.2 (17 Mar 2022 11:24), Max: 37.6 (16 Mar 2022 20:27)  T(F): 99 (17 Mar 2022 11:24), Max: 99.6 (16 Mar 2022 20:27)  HR: 67 (17 Mar 2022 11:24) (67 - 83)  BP: 154/62 (17 Mar 2022 11:24) (138/54 - 167/69)  BP(mean): --  RR: 18 (17 Mar 2022 11:24) (16 - 22)  SpO2: 99% (17 Mar 2022 11:24) (95% - 99%)  CAPILLARY BLOOD GLUCOSE      POCT Blood Glucose.: 112 mg/dL (17 Mar 2022 08:36)  POCT Blood Glucose.: 139 mg/dL (16 Mar 2022 21:47)  POCT Blood Glucose.: 136 mg/dL (16 Mar 2022 16:53)  POCT Blood Glucose.: 116 mg/dL (16 Mar 2022 13:31)    I&O's Summary    16 Mar 2022 07:01  -  17 Mar 2022 07:00  --------------------------------------------------------  IN: 360 mL / OUT: 0 mL / NET: 360 mL    17 Mar 2022 07:01  -  17 Mar 2022 12:08  --------------------------------------------------------  IN: 280 mL / OUT: 0 mL / NET: 280 mL          PHYSICAL EXAM  GENERAL: NAD, well-developed  HEAD:  Atraumatic, normocephalic  EYES: EOMI, PERRLA, conjunctiva and sclera clear  CHEST/LUNG: Clear to auscultation anteriorly; no wheezes  HEART: +S1+S2, regular rate and rhythm  ABDOMEN: Soft, nontender, nondistended; bowel sounds present  EXTREMITIES:  R AKA; LLE cold to touch below knee, + edema  PSYCH: AAOx3    LABS:                        9.6    20.19 )-----------( 243      ( 17 Mar 2022 06:40 )             28.4     03-17    142  |  111<H>  |  29<H>  ----------------------------<  94  3.6   |  15<L>  |  2.14<H>    Ca    8.3<L>      17 Mar 2022 06:40  Phos  3.4     03-17  Mg     1.4     03-17    TPro  6.4  /  Alb  2.5<L>  /  TBili  0.3  /  DBili  x   /  AST  114<H>  /  ALT  64<H>  /  AlkPhos  126<H>  03-16    PTT - ( 17 Mar 2022 06:40 )  PTT:51.4 sec          Culture - Blood (collected 15 Mar 2022 22:12)  Source: .Blood Blood  Preliminary Report (16 Mar 2022 23:01):    No growth to date.    Culture - Blood (collected 15 Mar 2022 22:12)  Source: .Blood Blood  Preliminary Report (16 Mar 2022 23:01):    No growth to date.        RADIOLOGY & ADDITIONAL TESTS:    Imaging Personally Reviewed:  Consultant(s) Notes Reviewed:    Care Discussed with Consultants/Other Providers:

## 2022-03-17 NOTE — PROGRESS NOTE ADULT - ASSESSMENT
76M w/ pmh of HTN, HLD, PVD s/p R AKA who was brought by EMS to [facility] after a mechanical fall from his wheelchair found to have CHF exacerbation, PNA as well as acute LLE arterial occlusions w/ unsuccessful angiogram. Stress test at OSH was positive, transferred for angiogram for cardiac clearance for possible LLE BKA

## 2022-03-17 NOTE — PROGRESS NOTE ADULT - PROBLEM SELECTOR PLAN 1
LHC completed, LM 10%, LAD 60%, Cx and RCA  with collaterals   Cardiology following- continue medical management  Continue Coreg, Lipitor LHC completed, LM 10%, LAD 60%, Cx and RCA  with collaterals   Cardiology following- continue medical management  Continue Coreg, Lipitor, Plavix

## 2022-03-17 NOTE — PROGRESS NOTE ADULT - PROBLEM SELECTOR PLAN 2
Per pt. request I return her phone call left a message on her home phone to call back to the office.   Improved-  afebrile and nontoxic appearing   Blood cultures NGTD x 2  UA negative for UTI  Repeat CXR with similar L infiltrate and small L effusion  ID following- start Zosyn if febrile for possible LLE source

## 2022-03-17 NOTE — PROGRESS NOTE ADULT - PROBLEM SELECTOR PLAN 6
Seen by PT, recommending ARNOLDO at discharge  Contacted transfer center on 3/16 to arrange transfer back to [facility] for continued treatment and planned BKA

## 2022-03-18 LAB
ANION GAP SERPL CALC-SCNC: 15 MMOL/L — SIGNIFICANT CHANGE UP (ref 5–17)
APTT BLD: 60.2 SEC — HIGH (ref 27.5–35.5)
BUN SERPL-MCNC: 20 MG/DL — SIGNIFICANT CHANGE UP (ref 7–23)
CALCIUM SERPL-MCNC: 8.2 MG/DL — LOW (ref 8.4–10.5)
CHLORIDE SERPL-SCNC: 109 MMOL/L — HIGH (ref 96–108)
CO2 SERPL-SCNC: 16 MMOL/L — LOW (ref 22–31)
CREAT SERPL-MCNC: 1.75 MG/DL — HIGH (ref 0.5–1.3)
EGFR: 40 ML/MIN/1.73M2 — LOW
GLUCOSE BLDC GLUCOMTR-MCNC: 104 MG/DL — HIGH (ref 70–99)
GLUCOSE BLDC GLUCOMTR-MCNC: 113 MG/DL — HIGH (ref 70–99)
GLUCOSE BLDC GLUCOMTR-MCNC: 93 MG/DL — SIGNIFICANT CHANGE UP (ref 70–99)
GLUCOSE BLDC GLUCOMTR-MCNC: 96 MG/DL — SIGNIFICANT CHANGE UP (ref 70–99)
GLUCOSE SERPL-MCNC: 107 MG/DL — HIGH (ref 70–99)
HCT VFR BLD CALC: 28.5 % — LOW (ref 39–50)
HGB BLD-MCNC: 9.7 G/DL — LOW (ref 13–17)
INR BLD: 1.43 RATIO — HIGH (ref 0.88–1.16)
MCHC RBC-ENTMCNC: 26.1 PG — LOW (ref 27–34)
MCHC RBC-ENTMCNC: 34 GM/DL — SIGNIFICANT CHANGE UP (ref 32–36)
MCV RBC AUTO: 76.8 FL — LOW (ref 80–100)
NRBC # BLD: 0 /100 WBCS — SIGNIFICANT CHANGE UP (ref 0–0)
PLATELET # BLD AUTO: 276 K/UL — SIGNIFICANT CHANGE UP (ref 150–400)
POTASSIUM SERPL-MCNC: 3.1 MMOL/L — LOW (ref 3.5–5.3)
POTASSIUM SERPL-SCNC: 3.1 MMOL/L — LOW (ref 3.5–5.3)
PROTHROM AB SERPL-ACNC: 16.5 SEC — HIGH (ref 10.5–13.4)
RBC # BLD: 3.71 M/UL — LOW (ref 4.2–5.8)
RBC # FLD: 16.3 % — HIGH (ref 10.3–14.5)
SARS-COV-2 RNA SPEC QL NAA+PROBE: SIGNIFICANT CHANGE UP
SARS-COV-2 RNA SPEC QL NAA+PROBE: SIGNIFICANT CHANGE UP
SODIUM SERPL-SCNC: 140 MMOL/L — SIGNIFICANT CHANGE UP (ref 135–145)
WBC # BLD: 19.97 K/UL — HIGH (ref 3.8–10.5)
WBC # FLD AUTO: 19.97 K/UL — HIGH (ref 3.8–10.5)

## 2022-03-18 PROCEDURE — 99232 SBSQ HOSP IP/OBS MODERATE 35: CPT

## 2022-03-18 RX ORDER — ACETAMINOPHEN 500 MG
650 TABLET ORAL EVERY 6 HOURS
Refills: 0 | Status: DISCONTINUED | OUTPATIENT
Start: 2022-03-18 | End: 2022-04-01

## 2022-03-18 RX ORDER — POTASSIUM CHLORIDE 20 MEQ
20 PACKET (EA) ORAL
Refills: 0 | Status: COMPLETED | OUTPATIENT
Start: 2022-03-18 | End: 2022-03-18

## 2022-03-18 RX ADMIN — BUDESONIDE AND FORMOTEROL FUMARATE DIHYDRATE 2 PUFF(S): 160; 4.5 AEROSOL RESPIRATORY (INHALATION) at 20:14

## 2022-03-18 RX ADMIN — HEPARIN SODIUM 13 UNIT(S)/HR: 5000 INJECTION INTRAVENOUS; SUBCUTANEOUS at 09:59

## 2022-03-18 RX ADMIN — Medication 600 MILLIGRAM(S): at 05:11

## 2022-03-18 RX ADMIN — CHLORHEXIDINE GLUCONATE 1 APPLICATION(S): 213 SOLUTION TOPICAL at 07:59

## 2022-03-18 RX ADMIN — Medication 1300 MILLIGRAM(S): at 11:03

## 2022-03-18 RX ADMIN — Medication 3 MILLILITER(S): at 18:58

## 2022-03-18 RX ADMIN — Medication 1300 MILLIGRAM(S): at 21:02

## 2022-03-18 RX ADMIN — Medication 4 MILLIGRAM(S): at 21:02

## 2022-03-18 RX ADMIN — Medication 3 MILLILITER(S): at 05:11

## 2022-03-18 RX ADMIN — PANTOPRAZOLE SODIUM 40 MILLIGRAM(S): 20 TABLET, DELAYED RELEASE ORAL at 09:05

## 2022-03-18 RX ADMIN — Medication 3 MILLILITER(S): at 00:10

## 2022-03-18 RX ADMIN — Medication 20 MILLIEQUIVALENT(S): at 16:37

## 2022-03-18 RX ADMIN — CARVEDILOL PHOSPHATE 12.5 MILLIGRAM(S): 80 CAPSULE, EXTENDED RELEASE ORAL at 17:42

## 2022-03-18 RX ADMIN — BUDESONIDE AND FORMOTEROL FUMARATE DIHYDRATE 2 PUFF(S): 160; 4.5 AEROSOL RESPIRATORY (INHALATION) at 09:06

## 2022-03-18 RX ADMIN — Medication 3 MILLILITER(S): at 11:03

## 2022-03-18 RX ADMIN — HEPARIN SODIUM 13 UNIT(S)/HR: 5000 INJECTION INTRAVENOUS; SUBCUTANEOUS at 19:09

## 2022-03-18 RX ADMIN — ATORVASTATIN CALCIUM 40 MILLIGRAM(S): 80 TABLET, FILM COATED ORAL at 21:02

## 2022-03-18 RX ADMIN — CARVEDILOL PHOSPHATE 12.5 MILLIGRAM(S): 80 CAPSULE, EXTENDED RELEASE ORAL at 05:11

## 2022-03-18 RX ADMIN — Medication 20 MILLIEQUIVALENT(S): at 19:00

## 2022-03-18 RX ADMIN — Medication 600 MILLIGRAM(S): at 17:43

## 2022-03-18 RX ADMIN — CLOPIDOGREL BISULFATE 75 MILLIGRAM(S): 75 TABLET, FILM COATED ORAL at 11:03

## 2022-03-18 RX ADMIN — Medication 1300 MILLIGRAM(S): at 05:11

## 2022-03-18 RX ADMIN — HEPARIN SODIUM 13 UNIT(S)/HR: 5000 INJECTION INTRAVENOUS; SUBCUTANEOUS at 07:35

## 2022-03-18 RX ADMIN — CHLORHEXIDINE GLUCONATE 1 APPLICATION(S): 213 SOLUTION TOPICAL at 11:01

## 2022-03-18 RX ADMIN — Medication 20 MILLIEQUIVALENT(S): at 20:41

## 2022-03-18 NOTE — PROGRESS NOTE ADULT - PROBLEM SELECTOR PLAN 6
Seen by PT, recommending ARNOLDO at discharge  Contacted transfer center on 3/16 to arrange transfer back to [facility] for continued treatment and planned BKA Seen by PT, recommending ARNOLDO at discharge  Contacted transfer center on 3/16, 3/17 and 3/18- attempting to arrange transfer back to [facility] for continued treatment and planned BKA

## 2022-03-18 NOTE — CONSULT NOTE ADULT - SUBJECTIVE AND OBJECTIVE BOX
VASCULAR SURGERY CONSULT NOTE  --------------------------------------------------------------------------------------------    HPI: 75 yo M w/ pmh of HTN, HLD, PVD, R AKA who was brought by EMS to [facility]  after a mechanical fall from his wheelchair at home. NO LOC or head injury and he denies dizziness syncope. He was admitted to [facility] for sepsis due to PNA w/ troponemia 2/2 to demand on 3/6/2022. He was also found to be in CHF as well as a LE arterial embolism. Pt received Azithromycin and Ceftriaxone for a B/L PNA seen on CT Chest. Hypoxic resp failure improved by day 2 of ICU stay. On 3/8 pt begin to complain of severe LLE pain and found to have LLE CFA and prox SFA occlusions.  Pt was started on a heparin gtt and an arterial angiogram was attempted by Vascular surgery however it was unsuccessful. During the aortogram he became hypertensive with RAFIQ and decision was made to abort the case. BKA was recommended. Pt was noted to have elevated CKs and managed for rhabdo. Pre-op stress testing for BKA revealed multiple ischemic regions. Pt was transferred to [facility] for cardiac cath.     ECHO revealed EF 35-40% , grade 2 DD.  (11 Mar 2022 16:57)        PAST MEDICAL & SURGICAL HISTORY:  HTN (hypertension)    Hyperlipidemia    PVD (peripheral vascular disease)    S/P BKA (below knee amputation), right      FAMILY HISTORY:    [] Family history not pertinent as reviewed with the patient and family    SOCIAL HISTORY:  ***    ALLERGIES: No Known Allergies      HOME MEDICATIONS:  ***    CURRENT MEDICATIONS  MEDICATIONS (STANDING): albuterol/ipratropium for Nebulization 3 milliLiter(s) Nebulizer every 6 hours  atorvastatin 40 milliGRAM(s) Oral at bedtime  budesonide 160 MICROgram(s)/formoterol 4.5 MICROgram(s) Inhaler 2 Puff(s) Inhalation two times a day  carvedilol 12.5 milliGRAM(s) Oral every 12 hours  clopidogrel Tablet 75 milliGRAM(s) Oral daily  doxazosin 4 milliGRAM(s) Oral at bedtime  guaiFENesin  milliGRAM(s) Oral every 12 hours  heparin  Infusion 1300 Unit(s)/Hr IV Continuous <Continuous>  insulin lispro (ADMELOG) corrective regimen sliding scale   SubCutaneous three times a day before meals  pantoprazole    Tablet 40 milliGRAM(s) Oral before breakfast  potassium chloride    Tablet ER 20 milliEquivalent(s) Oral every 2 hours  sodium bicarbonate 1300 milliGRAM(s) Oral three times a day    MEDICATIONS (PRN):acetaminophen     Tablet .. 650 milliGRAM(s) Oral every 6 hours PRN Moderate Pain (4 - 6)    --------------------------------------------------------------------------------------------    Vitals:   T(C): 37.4 (03-18-22 @ 20:07), Max: 37.4 (03-18-22 @ 20:07)  HR: 78 (03-18-22 @ 20:07) (77 - 92)  BP: 145/75 (03-18-22 @ 20:07) (100/73 - 156/80)  RR: 18 (03-18-22 @ 20:07) (18 - 18)  SpO2: 100% (03-18-22 @ 20:07) (97% - 100%)    --------------------------------------------------------  IN:    Oral Fluid: 880 mL  Total IN: 880 mL    OUT:    Stool (mL): 1 mL  Total OUT: 1 mL    Total NET: 879 mL      03-18 @ 07:01  -  03-18 @ 20:35  --------------------------------------------------------  IN:    Oral Fluid: 480 mL  Total IN: 480 mL    OUT:    Stool (mL): 1 mL  Total OUT: 1 mL    Total NET: 479 mL            PHYSICAL EXAM:  General: Alert, NAD  Neuro: A+Ox3  HEENT: NC/AT  Cardio: RRR  Resp: Unlabored breathing  GI/Abd: Soft, NT/ND  Vascular: LLE nonviable with demarcation below knee, insensate and no motor function, no Doppler signals in pop/DP/PT    --------------------------------------------------------------------------------------------    LABS  CBC (03-18 @ 09:15)                              9.7<L>                         19.97<H>  )----------------(  276        --    % Neutrophils, --    % Lymphocytes, ANC: --                                  28.5<L>    BMP (03-18 @ 11:00)             140     |  109<H>  |  20    		Ca++ --      Ca 8.2<L>             ---------------------------------( 107<H>		Mg --                 3.1<L>  |  16<L>   |  1.75<H>			Ph --          Coags (03-18 @ 08:42)  aPTT 60.2<H> / INR 1.43<H> / PT 16.5<H>    --------------------------------------------------------------------------------------------    MICROBIOLOGY    -> .Blood Blood Culture (03-15 @ 22:12)     NG    NG    No growth to date.    --------------------------------------------------------------------------------------------

## 2022-03-18 NOTE — PROGRESS NOTE ADULT - ASSESSMENT
76M w/ pmh of HTN, HLD, PVD s/p R AKA who was brought by EMS to [facility] after a mechanical fall from his wheelchair found to have CHF exacerbation, PNA as well as acute LLE arterial occlusions w/ unsuccessful angiogram. Stress test at OSH was positive, transferred for angiogram for cardiac clearance for possible LLE BKA

## 2022-03-18 NOTE — CONSULT NOTE ADULT - ASSESSMENT
76M w/ hx of HTN, HLD, PAD, right AKA, transferred from [facility] for cardiac cath after he underwent a LLE angiogram and had ST elevation during the procedure.    - Left leg is nonviable below the knee. No signs of infection.  - Patient is very high risk for any intervention. Recommend goals of care discussion as any intervention would carry high risk and may not improve his quality of life.  - Discussed with vascular surgery fellow    Vascular Surgery  Pager [phone] 76M w/ hx of HTN, HLD, PAD, right AKA, transferred from [facility] for cardiac cath after he underwent a LLE angiogram and had ST elevation during the procedure.    - Left leg is nonviable below the knee. No signs of infection.  - Patient is very high risk for any intervention. Recommend goals of care discussion as any intervention would carry high risk and may not improve his quality of life.  - Discussed with vascular surgery fellow    Vascular Surgery  Pager [phone]    ATTENDING STATEMENT :   Full consult note as above; discussed with surgery resident and vascular fellow.   He was admitted to [facility] with gangrene of the LT leg. He previously had a RT AKA. He was transferred to [facility] for a cardiac cath. He was found to have severe coronary disease with total occlusions. A revascularization is not possible. He has a slight contracture . 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 He needs an AKA. There is no other viable option for the leg. Clearly he is at high risk due to the CAD. We will make arrangements for it ot be done relatively soon.

## 2022-03-18 NOTE — PROGRESS NOTE ADULT - ATTENDING COMMENTS
I have seen this patient with the fellow and agree with their assessment and plan. I was physically present for significant portions of the evaluation and management (E/M) service provided.  I agree with the above history, physical, and plan which I have reviewed and edited where appropriate.    on dc can follow up with us  Please have patient naomi with Nephrology office at  with one of the physicians in 1-2 weeks  You can email [email or web address] to get follow up scheduled or call above number.    Our address is [address] [place] [address]   (Chris, Jordan, Finger, Devon, Bernadette, Batsheva, Nathan, Suzanne, Randall, Keke, Kirt, Quco, Rosalio, Kelsey, Marilyn, Sandro, or [facility])      Renea Pereira MD  Pager   Office     Contact me directly via [employer] Teams     (After 5 pm or on weekends please page the on-call fellow/attending, can check [email or web address] for schedule. Login is jamilah woo, schedule under [facility] medicine, psych, derm)

## 2022-03-18 NOTE — PROGRESS NOTE ADULT - SUBJECTIVE AND OBJECTIVE BOX
Patient is a 76y old  Male who presents with a chief complaint of Cardiac clearance for ischemic limb (15 Mar 2022 13:34)    f/u leukocytosis    Interval History/ROS:  no fever, tm 99.1 overnight.  c/o LLE pain, no diarrhea today.  no abdominal pain.  no dysuria.  Remainder of ROS otherwise negative.    PAST MEDICAL & SURGICAL HISTORY:  HTN (hypertension)  Hyperlipidemia  PVD (peripheral vascular disease)  S/P BKA (below knee amputation), right    Allergies  No Known Allergies    ANTIMICROBIALS:  none    MEDICATIONS  (STANDING):  albuterol/ipratropium for Nebulization 3 every 6 hours  atorvastatin 40 at bedtime  budesonide 160 MICROgram(s)/formoterol 4.5 MICROgram(s) Inhaler 2 two times a day  carvedilol 12.5 every 12 hours  clopidogrel Tablet 75 daily  doxazosin 4 at bedtime  guaiFENesin  every 12 hours  heparin  Infusion 1300 <Continuous>  insulin lispro (ADMELOG) corrective regimen sliding scale  three times a day before meals  pantoprazole    Tablet 40 before breakfast    Vital Signs Last 24 Hrs  T(F): 99 (22 @ 05:23), Max: 99.1 (22 @ 20:44)  HR: 88 (22 @ 12:10)  BP: 138/49 (22 @ 12:10)  RR: 18 (22 @ 05:23)  SpO2: 97% (22 @ 12:10) (97% - 98%)  Wt(kg): --    PHYSICAL EXAMINATION:  generally non-toxic, working with PT  Head/Eyes:  no icterus  ENT:  neck supple  Cardiac:  not tachycardic  Pulmonary:  not tachycpenic  Abdomen:  soft and non-tender  MSK:  R AKA, stump okay; L leg with cold foot; dry early gangrenous changes; no drainage; no malodor  Neuro:  awake and alert  Psych:  appropriate affect  Vascular:  PIV without phlebitis  Skin:  No rash                                               9.7    19.97 )-----------( 276      ( 18 Mar 2022 09:15 )             28.5     140  |  109  |  20  ----------------------------<  107  3.1   |  16  |  1.75  Ca    8.2      18 Mar 2022 11:00Phos  3.4     03-17Mg     1.4     03-17    Urinalysis Basic - ( 15 Mar 2022 02:23 )  Color: Light Yellow / Appearance: Slightly Turbid / S.012 / pH: x  Gluc: x / Ketone: Negative  / Bili: Negative / Urobili: Negative   Blood: x / Protein: 30 mg/dL / Nitrite: Negative   Leuk Esterase: Negative / RBC: 51 /hpf / WBC 6 /HPF   Sq Epi: x / Non Sq Epi: 2 /hpf / Bacteria: Negative    MICROBIOLOGY:  Culture - Blood (collected 03-15-22 @ 22:12)  Source: .Blood Blood  Preliminary Report (22 @ 23:01):    No growth to date.    Culture - Blood (collected 03-15-22 @ 22:12)  Source: .Blood Blood  Preliminary Report (22 @ 23:01):    No growth to date.    RADIOLOGY:  imaging below personally reviewed and agree with findings    Xray Chest 1 View- PORTABLE-Routine (Xray Chest 1 View- PORTABLE-Routine .) (22 @ 17:33) >  IMPRESSION:  Similar left infiltrate. Smaller left effusion.    Xray Chest 1 View AP/PA (22 @ 18:43) >  IMPRESSION:  Bibasilar opacities may represent atelectasis and/or layering pleural effusion.        ECHO:  TTE with Doppler (w/Cont) (22 @ 08:43) >  Conclusions:  1. Tethered mitral valve leaflets with normal opening. Mild mitral regurgitation.  2. Aortic valve not well visualized; appears to be a calcified trileaflet valve with normal opening. No aortic valve regurgitation seen.  3. Endocardial visualization enhanced with intravenous injection of Ultrasonic Enhancing Agent (Definity).  Moderate segmental left ventricular systolic dysfunction.  The inferolateral, basal inferior, and basal septal walls are akinetic. The anterolateral and apical walls are hypokinetic. No left ventricular thrombus.  4. Normal right ventricular size and function.  5. Trace pericardial effusion.  *** No previous Echo exam.

## 2022-03-18 NOTE — PROGRESS NOTE ADULT - PROBLEM SELECTOR PLAN 3
Baseline unknown but continues to improve  Renal sono with decreased echogenicty c/w parenchymal disease  Hypernatremia- resolved- encourage free water intake Baseline unknown but continues to improve  Renal sono with decreased echogenicty c/w parenchymal disease  Hypernatremia- resolved- encourage free water intake  Hypokalemia- replete

## 2022-03-18 NOTE — PROGRESS NOTE ADULT - PROBLEM SELECTOR PLAN 2
Improved-  afebrile and nontoxic appearing   Blood cultures NGTD x 2  UA negative for UTI  Repeat CXR with similar L infiltrate and small L effusion  ID following- start Zosyn if febrile for possible LLE source Remains afebrile and nontoxic appearing   Blood cultures (3/15) NGTD x 2  UA negative for UTI  Repeat CXR with similar L infiltrate and small L effusion  ID following- start Zosyn if febrile for possible LLE source

## 2022-03-18 NOTE — PROGRESS NOTE ADULT - PROBLEM SELECTOR PLAN 1
BAUDILIO vs CKD vs BAUDILIO on CKD. Pt with no prior baseline Scr.   Baseline unknown. No prior labs on [facility].   SCr 3.06 on 3/11/22 and improved to 1.75. Pt. with no prior CKD work up.  Continue to ACE, ARB, diuretics prior to cath. Monitor labs and urine output. Avoid NSAIDs, ACEI/ARBS, RCA and nephrotoxins. Dose medications as per eGFR.

## 2022-03-18 NOTE — PROGRESS NOTE ADULT - ASSESSMENT
76M with HTN, HLD, PVD, R AKA who presented to [facility] earlier this month after a mechanical fall from his wheelchair at home. NO LOC or head injury and he denies dizziness syncope. Noted to have sepsis due to pneumonia and treated with Azithromycin and Ceftriaxone for pneumonia.  Developed severe LLE pain due to ischemia.  Plan was for angiogram, however, developed HTN.  Had a positive stress test and Transferred to [facility] on 3/11/2022 for cardiac cath.  No fever, however noted to have worsening leukocytosis.  NO definite diarrhea    Leukocytosis  - likely due to ischemic foot  - better today  - stable off antibiotics  - blood cultures negative  - for transfer back to [facility] for BKA  - if he develops fever or clinically worsens, can start zosyn     BAUDILIO on CKD  - creatinine improving, today 1.75  - renally dose all medications    Please call the ID service [phone] with questions or concerns over the weekend

## 2022-03-18 NOTE — PROGRESS NOTE ADULT - SUBJECTIVE AND OBJECTIVE BOX
Ra Seo MD    Patient is a 76y old  Male who presents with a chief complaint of Cardiac clearance for ischemic limb (17 Mar 2022 12:53)        SUBJECTIVE / OVERNIGHT EVENTS: Patient with continued LLE pain, exacerbated with movement. Denies CP/SOB  TELEMETRY: SR , PAT 2.4 seconds to 147      MEDICATIONS  (STANDING):  albuterol/ipratropium for Nebulization 3 milliLiter(s) Nebulizer every 6 hours  atorvastatin 40 milliGRAM(s) Oral at bedtime  budesonide 160 MICROgram(s)/formoterol 4.5 MICROgram(s) Inhaler 2 Puff(s) Inhalation two times a day  carvedilol 12.5 milliGRAM(s) Oral every 12 hours  chlorhexidine 2% Cloths 1 Application(s) Topical <User Schedule>  chlorhexidine 4% Liquid 1 Application(s) Topical <User Schedule>  clopidogrel Tablet 75 milliGRAM(s) Oral daily  doxazosin 4 milliGRAM(s) Oral at bedtime  guaiFENesin  milliGRAM(s) Oral every 12 hours  heparin  Infusion 1300 Unit(s)/Hr (13 mL/Hr) IV Continuous <Continuous>  insulin lispro (ADMELOG) corrective regimen sliding scale   SubCutaneous three times a day before meals  pantoprazole    Tablet 40 milliGRAM(s) Oral before breakfast  sodium bicarbonate 1300 milliGRAM(s) Oral three times a day    MEDICATIONS  (PRN):  acetaminophen     Tablet .. 650 milliGRAM(s) Oral every 6 hours PRN Moderate Pain (4 - 6)      Vital Signs Last 24 Hrs  T(C): 37.2 (18 Mar 2022 05:23), Max: 37.3 (17 Mar 2022 20:44)  T(F): 99 (18 Mar 2022 05:23), Max: 99.1 (17 Mar 2022 20:44)  HR: 84 (18 Mar 2022 05:23) (78 - 84)  BP: 100/73 (18 Mar 2022 05:23) (100/73 - 156/80)  BP(mean): --  RR: 18 (18 Mar 2022 05:23) (18 - 18)  SpO2: 97% (18 Mar 2022 05:23) (97% - 98%)  CAPILLARY BLOOD GLUCOSE      POCT Blood Glucose.: 104 mg/dL (18 Mar 2022 11:47)  POCT Blood Glucose.: 113 mg/dL (18 Mar 2022 07:38)  POCT Blood Glucose.: 130 mg/dL (17 Mar 2022 21:41)  POCT Blood Glucose.: 112 mg/dL (17 Mar 2022 17:17)  POCT Blood Glucose.: 125 mg/dL (17 Mar 2022 12:08)    I&O's Summary    17 Mar 2022 07:01  -  18 Mar 2022 07:00  --------------------------------------------------------  IN: 880 mL / OUT: 1 mL / NET: 879 mL    18 Mar 2022 07:01  -  18 Mar 2022 12:06  --------------------------------------------------------  IN: 120 mL / OUT: 1 mL / NET: 119 mL          PHYSICAL EXAM  GENERAL: NAD, well-developed  HEAD:  Atraumatic, normocephalic  EYES: EOMI, PERRLA, conjunctiva and sclera clear  CHEST/LUNG: Clear to auscultation anteriorly; no wheezes  HEART: +S1+S2, regular rate and rhythm  ABDOMEN: Soft, nontender, mildly distended; bowel sounds present  EXTREMITIES:  R AKA; LLE cold to touch below knee, + edema  PSYCH: AAOx3    LABS:                        9.7    19.97 )-----------( 276      ( 18 Mar 2022 09:15 )             28.5     03-18    140  |  109<H>  |  20  ----------------------------<  107<H>  3.1<L>   |  16<L>  |  1.75<H>    Ca    8.2<L>      18 Mar 2022 11:00  Phos  3.4     03-17  Mg     1.4     03-17      PT/INR - ( 18 Mar 2022 08:42 )   PT: 16.5 sec;   INR: 1.43 ratio         PTT - ( 18 Mar 2022 08:42 )  PTT:60.2 sec          Culture - Blood (collected 15 Mar 2022 22:12)  Source: .Blood Blood  Preliminary Report (16 Mar 2022 23:01):    No growth to date.    Culture - Blood (collected 15 Mar 2022 22:12)  Source: .Blood Blood  Preliminary Report (16 Mar 2022 23:01):    No growth to date.        RADIOLOGY & ADDITIONAL TESTS:    Imaging Personally Reviewed:  Consultant(s) Notes Reviewed:    Care Discussed with Consultants/Other Providers:

## 2022-03-18 NOTE — PROGRESS NOTE ADULT - SUBJECTIVE AND OBJECTIVE BOX
[facility] DIVISION OF KIDNEY DISEASES AND HYPERTENSION -- FOLLOW UP NOTE  --------------------------------------------------------------------------------  Chief Complaint: BAUDILIO on CKD    24 hour events/subjective: Pt. seen and examined not in distress. Pt. underwent LHC on 3/16. Scr improved to 1.75.      PAST HISTORY  --------------------------------------------------------------------------------  No significant changes to PMH, PSH, FHx, SHx, unless otherwise noted    ALLERGIES & MEDICATIONS  --------------------------------------------------------------------------------  Allergies    No Known Allergies    Intolerances      Standing Inpatient Medications  albuterol/ipratropium for Nebulization 3 milliLiter(s) Nebulizer every 6 hours  atorvastatin 40 milliGRAM(s) Oral at bedtime  budesonide 160 MICROgram(s)/formoterol 4.5 MICROgram(s) Inhaler 2 Puff(s) Inhalation two times a day  carvedilol 12.5 milliGRAM(s) Oral every 12 hours  chlorhexidine 2% Cloths 1 Application(s) Topical <User Schedule>  chlorhexidine 4% Liquid 1 Application(s) Topical <User Schedule>  clopidogrel Tablet 75 milliGRAM(s) Oral daily  doxazosin 4 milliGRAM(s) Oral at bedtime  guaiFENesin  milliGRAM(s) Oral every 12 hours  heparin  Infusion 1300 Unit(s)/Hr IV Continuous <Continuous>  insulin lispro (ADMELOG) corrective regimen sliding scale   SubCutaneous three times a day before meals  pantoprazole    Tablet 40 milliGRAM(s) Oral before breakfast  potassium chloride    Tablet ER 20 milliEquivalent(s) Oral every 2 hours  sodium bicarbonate 1300 milliGRAM(s) Oral three times a day    PRN Inpatient Medications  acetaminophen     Tablet .. 650 milliGRAM(s) Oral every 6 hours PRN      REVIEW OF SYSTEMS  --------------------------------------------------------------------------------  Gen: weakness+  Skin: No rashes  Head/Eyes/Ears: Normal hearing,   Respiratory: No dyspnea, cough  CV: No chest pain  GI: No abdominal pain, diarrhea  : as per HPI,   MSK: No  edema    All other systems were reviewed and are negative, except as noted.    VITALS/PHYSICAL EXAM  --------------------------------------------------------------------------------  T(C): 36.6 (03-18-22 @ 11:40), Max: 37.3 (03-17-22 @ 20:44)  HR: 88 (03-18-22 @ 12:10) (77 - 88)  BP: 138/49 (03-18-22 @ 12:10) (100/73 - 156/80)  RR: 18 (03-18-22 @ 11:40) (18 - 18)  SpO2: 97% (03-18-22 @ 12:10) (97% - 98%)  Wt(kg): --      03-17-22 @ 07:01  -  03-18-22 @ 07:00  --------------------------------------------------------  IN: 880 mL / OUT: 1 mL / NET: 879 mL    03-18-22 @ 07:01  -  03-18-22 @ 13:41  --------------------------------------------------------  IN: 120 mL / OUT: 1 mL / NET: 119 mL      Physical Exam:  	Gen: NAD  	HEENT: MMM  	Pulm: CTA B/L  	CV: S1S2  	Abd: Soft, +BS   	Ext: R AKA, NO edema LE  	Neuro: Awake, alert  	Skin: Warm and dry      LABS/STUDIES  --------------------------------------------------------------------------------              9.7    19.97 >-----------<  276      [03-18-22 @ 09:15]              28.5     140  |  109  |  20  ----------------------------<  107      [03-18-22 @ 11:00]  3.1   |  16  |  1.75        Ca     8.2     [03-18-22 @ 11:00]      Mg     1.4     [03-17-22 @ 06:40]      Phos  3.4     [03-17-22 @ 06:40]    Creatinine Trend:  SCr 1.75 [03-18 @ 11:00]  SCr 2.14 [03-17 @ 06:40]  SCr 2.34 [03-16 @ 06:48]  SCr 2.61 [03-15 @ 06:08]  SCr 3.06 [03-14 @ 06:54]    Urinalysis - [03-15-22 @ 02:23]      Color Light Yellow / Appearance Slightly Turbid / SG 1.012 / pH 5.5      Gluc Negative / Ketone Negative  / Bili Negative / Urobili Negative       Blood Large / Protein 30 mg/dL / Leuk Est Negative / Nitrite Negative      RBC 51 / WBC 6 / Hyaline 7 / Gran 2 / Sq Epi  / Non Sq Epi 2 / Bacteria Negative    Urine Creatinine 68      [03-15-22 @ 02:24]  Urine Sodium 68      [03-15-22 @ 02:24]  Urine Potassium 21      [03-15-22 @ 02:24]  Urine Chloride 64      [03-15-22 @ 02:24]  Urine Osmolality 365      [03-15-22 @ 02:24]    HCV 0.16, Nonreact      [03-12-22 @ 00:43]

## 2022-03-18 NOTE — PROGRESS NOTE ADULT - PROBLEM SELECTOR PLAN 1
LHC completed, LM 10%, LAD 60%, Cx and RCA  with collaterals   Cardiology following- continue medical management  Continue Coreg, Lipitor, Plavix LHC completed, LM 10%, LAD 60%, Cx and RCA  with collaterals   Per cardiology continue medical management  Continue Coreg, Lipitor, Plavix

## 2022-03-18 NOTE — PROGRESS NOTE ADULT - PROBLEM SELECTOR PLAN 2
In setting of renal failure. SCo2 improved to 16. Recommend PO sodium bicarbonate 1300 mg TID. Monitor SCo2 daily.

## 2022-03-19 LAB
ANION GAP SERPL CALC-SCNC: 13 MMOL/L — SIGNIFICANT CHANGE UP (ref 5–17)
APTT BLD: 49.7 SEC — HIGH (ref 27.5–35.5)
APTT BLD: 68.9 SEC — HIGH (ref 27.5–35.5)
BUN SERPL-MCNC: 19 MG/DL — SIGNIFICANT CHANGE UP (ref 7–23)
CALCIUM SERPL-MCNC: 8.2 MG/DL — LOW (ref 8.4–10.5)
CHLORIDE SERPL-SCNC: 109 MMOL/L — HIGH (ref 96–108)
CO2 SERPL-SCNC: 18 MMOL/L — LOW (ref 22–31)
CREAT SERPL-MCNC: 1.71 MG/DL — HIGH (ref 0.5–1.3)
EGFR: 41 ML/MIN/1.73M2 — LOW
GLUCOSE BLDC GLUCOMTR-MCNC: 113 MG/DL — HIGH (ref 70–99)
GLUCOSE BLDC GLUCOMTR-MCNC: 122 MG/DL — HIGH (ref 70–99)
GLUCOSE BLDC GLUCOMTR-MCNC: 123 MG/DL — HIGH (ref 70–99)
GLUCOSE BLDC GLUCOMTR-MCNC: 99 MG/DL — SIGNIFICANT CHANGE UP (ref 70–99)
GLUCOSE SERPL-MCNC: 94 MG/DL — SIGNIFICANT CHANGE UP (ref 70–99)
HCT VFR BLD CALC: 27.4 % — LOW (ref 39–50)
HGB BLD-MCNC: 9.2 G/DL — LOW (ref 13–17)
MAGNESIUM SERPL-MCNC: 1.6 MG/DL — SIGNIFICANT CHANGE UP (ref 1.6–2.6)
MCHC RBC-ENTMCNC: 25.8 PG — LOW (ref 27–34)
MCHC RBC-ENTMCNC: 33.6 GM/DL — SIGNIFICANT CHANGE UP (ref 32–36)
MCV RBC AUTO: 77 FL — LOW (ref 80–100)
NRBC # BLD: 0 /100 WBCS — SIGNIFICANT CHANGE UP (ref 0–0)
PLATELET # BLD AUTO: 250 K/UL — SIGNIFICANT CHANGE UP (ref 150–400)
POTASSIUM SERPL-MCNC: 3.1 MMOL/L — LOW (ref 3.5–5.3)
POTASSIUM SERPL-SCNC: 3.1 MMOL/L — LOW (ref 3.5–5.3)
RBC # BLD: 3.56 M/UL — LOW (ref 4.2–5.8)
RBC # FLD: 16.1 % — HIGH (ref 10.3–14.5)
SODIUM SERPL-SCNC: 140 MMOL/L — SIGNIFICANT CHANGE UP (ref 135–145)
WBC # BLD: 15.89 K/UL — HIGH (ref 3.8–10.5)
WBC # FLD AUTO: 15.89 K/UL — HIGH (ref 3.8–10.5)

## 2022-03-19 PROCEDURE — 99232 SBSQ HOSP IP/OBS MODERATE 35: CPT

## 2022-03-19 RX ORDER — POTASSIUM CHLORIDE 20 MEQ
20 PACKET (EA) ORAL
Refills: 0 | Status: COMPLETED | OUTPATIENT
Start: 2022-03-19 | End: 2022-03-19

## 2022-03-19 RX ORDER — HEPARIN SODIUM 5000 [USP'U]/ML
1400 INJECTION INTRAVENOUS; SUBCUTANEOUS
Qty: 25000 | Refills: 0 | Status: DISCONTINUED | OUTPATIENT
Start: 2022-03-19 | End: 2022-03-20

## 2022-03-19 RX ORDER — MAGNESIUM SULFATE 500 MG/ML
2 VIAL (ML) INJECTION ONCE
Refills: 0 | Status: COMPLETED | OUTPATIENT
Start: 2022-03-19 | End: 2022-03-19

## 2022-03-19 RX ADMIN — Medication 3 MILLILITER(S): at 11:27

## 2022-03-19 RX ADMIN — CHLORHEXIDINE GLUCONATE 1 APPLICATION(S): 213 SOLUTION TOPICAL at 05:51

## 2022-03-19 RX ADMIN — CARVEDILOL PHOSPHATE 12.5 MILLIGRAM(S): 80 CAPSULE, EXTENDED RELEASE ORAL at 05:47

## 2022-03-19 RX ADMIN — BUDESONIDE AND FORMOTEROL FUMARATE DIHYDRATE 2 PUFF(S): 160; 4.5 AEROSOL RESPIRATORY (INHALATION) at 09:08

## 2022-03-19 RX ADMIN — Medication 600 MILLIGRAM(S): at 17:17

## 2022-03-19 RX ADMIN — Medication 4 MILLIGRAM(S): at 21:52

## 2022-03-19 RX ADMIN — Medication 3 MILLILITER(S): at 00:46

## 2022-03-19 RX ADMIN — HEPARIN SODIUM 14 UNIT(S)/HR: 5000 INJECTION INTRAVENOUS; SUBCUTANEOUS at 09:06

## 2022-03-19 RX ADMIN — HEPARIN SODIUM 13 UNIT(S)/HR: 5000 INJECTION INTRAVENOUS; SUBCUTANEOUS at 07:20

## 2022-03-19 RX ADMIN — Medication 3 MILLILITER(S): at 17:17

## 2022-03-19 RX ADMIN — Medication 3 MILLILITER(S): at 05:47

## 2022-03-19 RX ADMIN — CHLORHEXIDINE GLUCONATE 1 APPLICATION(S): 213 SOLUTION TOPICAL at 06:39

## 2022-03-19 RX ADMIN — HEPARIN SODIUM 14 UNIT(S)/HR: 5000 INJECTION INTRAVENOUS; SUBCUTANEOUS at 19:10

## 2022-03-19 RX ADMIN — Medication 1300 MILLIGRAM(S): at 05:49

## 2022-03-19 RX ADMIN — Medication 25 GRAM(S): at 11:24

## 2022-03-19 RX ADMIN — HEPARIN SODIUM 13 UNIT(S)/HR: 5000 INJECTION INTRAVENOUS; SUBCUTANEOUS at 06:52

## 2022-03-19 RX ADMIN — Medication 20 MILLIEQUIVALENT(S): at 11:25

## 2022-03-19 RX ADMIN — Medication 600 MILLIGRAM(S): at 05:47

## 2022-03-19 RX ADMIN — HEPARIN SODIUM 13 UNIT(S)/HR: 5000 INJECTION INTRAVENOUS; SUBCUTANEOUS at 02:16

## 2022-03-19 RX ADMIN — Medication 20 MILLIEQUIVALENT(S): at 13:27

## 2022-03-19 RX ADMIN — HEPARIN SODIUM 14 UNIT(S)/HR: 5000 INJECTION INTRAVENOUS; SUBCUTANEOUS at 22:34

## 2022-03-19 RX ADMIN — CLOPIDOGREL BISULFATE 75 MILLIGRAM(S): 75 TABLET, FILM COATED ORAL at 11:25

## 2022-03-19 RX ADMIN — ATORVASTATIN CALCIUM 40 MILLIGRAM(S): 80 TABLET, FILM COATED ORAL at 21:52

## 2022-03-19 RX ADMIN — Medication 20 MILLIEQUIVALENT(S): at 09:07

## 2022-03-19 RX ADMIN — CARVEDILOL PHOSPHATE 12.5 MILLIGRAM(S): 80 CAPSULE, EXTENDED RELEASE ORAL at 17:17

## 2022-03-19 RX ADMIN — PANTOPRAZOLE SODIUM 40 MILLIGRAM(S): 20 TABLET, DELAYED RELEASE ORAL at 09:07

## 2022-03-19 RX ADMIN — HEPARIN SODIUM 14 UNIT(S)/HR: 5000 INJECTION INTRAVENOUS; SUBCUTANEOUS at 19:21

## 2022-03-19 RX ADMIN — Medication 1300 MILLIGRAM(S): at 13:26

## 2022-03-19 RX ADMIN — Medication 1300 MILLIGRAM(S): at 21:52

## 2022-03-19 NOTE — PROGRESS NOTE ADULT - PROBLEM SELECTOR PLAN 2
Improved leukocytosis- remains afebrile and nontoxic appearing   Blood cultures (3/15) NGTD x 2  UA negative for UTI  Repeat CXR with similar L infiltrate and small L effusion  ID following- start Zosyn if febrile for possible LLE source

## 2022-03-19 NOTE — PROGRESS NOTE ADULT - ASSESSMENT
76M w/ hx of HTN, HLD, PAD, right AKA, transferred from [facility] for cardiac cath after he underwent a LLE angiogram and had ST elevation during the procedure. Since transfer patient underwent diagnostic cardiac catheterization showing severe CAD in the LCx and RCA. Following discussion of GOC decision made to undergo amputation.     Recommendation   - Left leg is nonviable below the knee. No signs of infection.  - Patient is very high risk for any intervention.   - Please document medical risk stratification and clearance  - Please document cardiology risk stratification and clearance   - Will plan for OR with Dr. Sterling for AKA pending medical documentation of risk stratification and optimization  - Will follow with you     Enoch Sparks MD PGY2  Vascular Surgery Team  [phone]

## 2022-03-19 NOTE — PROGRESS NOTE ADULT - PROBLEM SELECTOR PLAN 3
Baseline unknown but improved  Renal sono with decreased echogenicty c/w parenchymal disease  Hypernatremia- resolved- encourage free water intake  Hypokalemia- replete

## 2022-03-19 NOTE — PROGRESS NOTE ADULT - PROBLEM SELECTOR PLAN 6
Seen by PT, recommending ARNOLDO at discharge  [facility] refused transfer back to that facility- Arranging for L BKA here next week

## 2022-03-19 NOTE — PROGRESS NOTE ADULT - SUBJECTIVE AND OBJECTIVE BOX
Ra Seo MD    Patient is a 76y old  Male who presents with a chief complaint of Cardiac clearnace for ischemic limb (18 Mar 2022 18:33)        SUBJECTIVE / OVERNIGHT EVENTS: Persistent LLE pain. Denies CP /SOB  TELEMETRY : SR/ST 80-90's, up 140's briefly, PACs, PVCs      MEDICATIONS  (STANDING):  albuterol/ipratropium for Nebulization 3 milliLiter(s) Nebulizer every 6 hours  atorvastatin 40 milliGRAM(s) Oral at bedtime  budesonide 160 MICROgram(s)/formoterol 4.5 MICROgram(s) Inhaler 2 Puff(s) Inhalation two times a day  carvedilol 12.5 milliGRAM(s) Oral every 12 hours  chlorhexidine 2% Cloths 1 Application(s) Topical <User Schedule>  chlorhexidine 4% Liquid 1 Application(s) Topical <User Schedule>  clopidogrel Tablet 75 milliGRAM(s) Oral daily  doxazosin 4 milliGRAM(s) Oral at bedtime  guaiFENesin  milliGRAM(s) Oral every 12 hours  heparin  Infusion 1400 Unit(s)/Hr (14 mL/Hr) IV Continuous <Continuous>  insulin lispro (ADMELOG) corrective regimen sliding scale   SubCutaneous three times a day before meals  pantoprazole    Tablet 40 milliGRAM(s) Oral before breakfast  sodium bicarbonate 1300 milliGRAM(s) Oral three times a day    MEDICATIONS  (PRN):  acetaminophen     Tablet .. 650 milliGRAM(s) Oral every 6 hours PRN Moderate Pain (4 - 6)      Vital Signs Last 24 Hrs  T(C): 37.1 (19 Mar 2022 12:00), Max: 37.4 (18 Mar 2022 20:07)  T(F): 98.8 (19 Mar 2022 12:00), Max: 99.3 (18 Mar 2022 20:07)  HR: 87 (19 Mar 2022 12:00) (78 - 92)  BP: 124/58 (19 Mar 2022 12:00) (122/73 - 154/65)  BP(mean): --  RR: 18 (19 Mar 2022 12:00) (18 - 18)  SpO2: 94% (19 Mar 2022 12:00) (94% - 100%)  CAPILLARY BLOOD GLUCOSE      POCT Blood Glucose.: 123 mg/dL (19 Mar 2022 12:10)  POCT Blood Glucose.: 113 mg/dL (19 Mar 2022 08:26)  POCT Blood Glucose.: 93 mg/dL (18 Mar 2022 21:29)  POCT Blood Glucose.: 96 mg/dL (18 Mar 2022 16:54)    I&O's Summary    18 Mar 2022 07:01  -  19 Mar 2022 07:00  --------------------------------------------------------  IN: 480 mL / OUT: 1 mL / NET: 479 mL          PHYSICAL EXAM  GENERAL: NAD, well-developed  HEAD:  Atraumatic, normocephalic  EYES: EOMI, PERRLA, conjunctiva and sclera clear  CHEST/LUNG: Clear to auscultation anteriorly; no wheezes  HEART: +S1+S2, regular rate and rhythm  ABDOMEN: Soft, nontender, mildly distended; bowel sounds present  EXTREMITIES:  R AKA; LLE cold to touch below knee, + edema  PSYCH: AAOx3    LABS:                        9.2    15.89 )-----------( 250      ( 19 Mar 2022 06:14 )             27.4     03-19    140  |  109<H>  |  19  ----------------------------<  94  3.1<L>   |  18<L>  |  1.71<H>    Ca    8.2<L>      19 Mar 2022 06:11  Mg     1.6     03-19      PT/INR - ( 18 Mar 2022 08:42 )   PT: 16.5 sec;   INR: 1.43 ratio         PTT - ( 19 Mar 2022 06:14 )  PTT:49.7 sec            RADIOLOGY & ADDITIONAL TESTS:    Imaging Personally Reviewed:  Consultant(s) Notes Reviewed:    Care Discussed with Consultants/Other Providers:

## 2022-03-19 NOTE — PROGRESS NOTE ADULT - PROBLEM SELECTOR PLAN 1
LHC completed, LM 10%, LAD 60%, Cx and RCA  with collaterals   Per cardiology continue medical management  Continue Coreg, Lipitor, Plavix

## 2022-03-19 NOTE — PROGRESS NOTE ADULT - SUBJECTIVE AND OBJECTIVE BOX
VASCULAR SURGERY PROGRESS NOTE   ___________________________________________________________________    THI HASTINGS | [id number] | 76y Male | [facility] 254 D1 | LOS 8d    Attending: Ra Seo    ___________________________________________________________________    SUBJECTIVE:   Patient seen today during morning rounds at bedside and found to be without acute distress.     Overnight: Unremarkable    Allergies:  NKDA    OBJECTIVE:  Vitals:    T(C): 37.1 (22 @ 12:00), Max: 37.4 (22 @ 20:07)  HR: 86 (22 @ 17:16) (78 - 89)  BP: 152/78 (22 @ 17:16) (122/73 - 152/78)  RR: 18 (22 @ 12:00) (18 - 18)  SpO2: 94% (22 @ 12:00) (94% - 100%)      OUT:    Stool (mL): 1 mL  Total OUT: 1 mL        Physical Exam:   Constitutional: resting in bed with no acute distress  Respiratory: unlabored breathing, clear respiration  Gastrointestinal: Abdomen soft, non distended, non-tender  Extremities: soft compartments    Medications:  clopidogrel Tablet 75 Oral daily  heparin  Infusion 1400 IV Continuous <Continuous>    albuterol/ipratropium for Nebulization 3 milliLiter(s) Nebulizer every 6 hours  budesonide 160 MICROgram(s)/formoterol 4.5 MICROgram(s) Inhaler 2 Puff(s) Inhalation two times a day  carvedilol 12.5 milliGRAM(s) Oral every 12 hours  doxazosin 4 milliGRAM(s) Oral at bedtime  guaiFENesin  milliGRAM(s) Oral every 12 hours  pantoprazole    Tablet 40 milliGRAM(s) Oral before breakfast        Laboratory:  WBC: 15.89 H&H: 9.2/27.4 Plt: 250  WBC: 19.97 H&H: 9.7/28.5 Plt: 276    Chemistry:                               Phos: xx M.6  140  |  109  |  19  ----------------------------<  94  3.1   |  18  |  1.71        ,                              Phos: xx Mg: xx  140  |  109  |  20  ----------------------------<  107  3.1   |  16  |  1.75          PTT 49.7 PT/INR ---/---          Reviewed laboratory and imaging

## 2022-03-19 NOTE — PROGRESS NOTE ADULT - ASSESSMENT
76M w/ pmh of HTN, HLD, PVD s/p R AKA who was brought by EMS to [facility] after a mechanical fall from his wheelchair found to have CHF exacerbation, PNA as well as acute LLE arterial occlusions w/ unsuccessful angiogram. Stress test at OSH was positive, transferred for angiogram for cardiac clearance for possible LLE BKA

## 2022-03-20 LAB
ANION GAP SERPL CALC-SCNC: 16 MMOL/L — SIGNIFICANT CHANGE UP (ref 5–17)
APTT BLD: 49.3 SEC — HIGH (ref 27.5–35.5)
APTT BLD: 79.3 SEC — HIGH (ref 27.5–35.5)
APTT BLD: 93.9 SEC — HIGH (ref 27.5–35.5)
BUN SERPL-MCNC: 17 MG/DL — SIGNIFICANT CHANGE UP (ref 7–23)
CALCIUM SERPL-MCNC: 8.5 MG/DL — SIGNIFICANT CHANGE UP (ref 8.4–10.5)
CHLORIDE SERPL-SCNC: 108 MMOL/L — SIGNIFICANT CHANGE UP (ref 96–108)
CO2 SERPL-SCNC: 18 MMOL/L — LOW (ref 22–31)
CREAT SERPL-MCNC: 1.63 MG/DL — HIGH (ref 0.5–1.3)
CULTURE RESULTS: SIGNIFICANT CHANGE UP
CULTURE RESULTS: SIGNIFICANT CHANGE UP
EGFR: 44 ML/MIN/1.73M2 — LOW
GLUCOSE BLDC GLUCOMTR-MCNC: 113 MG/DL — HIGH (ref 70–99)
GLUCOSE BLDC GLUCOMTR-MCNC: 119 MG/DL — HIGH (ref 70–99)
GLUCOSE BLDC GLUCOMTR-MCNC: 131 MG/DL — HIGH (ref 70–99)
GLUCOSE BLDC GLUCOMTR-MCNC: 138 MG/DL — HIGH (ref 70–99)
GLUCOSE SERPL-MCNC: 110 MG/DL — HIGH (ref 70–99)
HCT VFR BLD CALC: 26.8 % — LOW (ref 39–50)
HGB BLD-MCNC: 9.2 G/DL — LOW (ref 13–17)
MAGNESIUM SERPL-MCNC: 1.8 MG/DL — SIGNIFICANT CHANGE UP (ref 1.6–2.6)
MCHC RBC-ENTMCNC: 26.1 PG — LOW (ref 27–34)
MCHC RBC-ENTMCNC: 34.3 GM/DL — SIGNIFICANT CHANGE UP (ref 32–36)
MCV RBC AUTO: 75.9 FL — LOW (ref 80–100)
NRBC # BLD: 0 /100 WBCS — SIGNIFICANT CHANGE UP (ref 0–0)
PHOSPHATE SERPL-MCNC: 2.9 MG/DL — SIGNIFICANT CHANGE UP (ref 2.5–4.5)
PLATELET # BLD AUTO: 192 K/UL — SIGNIFICANT CHANGE UP (ref 150–400)
POTASSIUM SERPL-MCNC: 3.4 MMOL/L — LOW (ref 3.5–5.3)
POTASSIUM SERPL-SCNC: 3.4 MMOL/L — LOW (ref 3.5–5.3)
RBC # BLD: 3.53 M/UL — LOW (ref 4.2–5.8)
RBC # FLD: 16.4 % — HIGH (ref 10.3–14.5)
SODIUM SERPL-SCNC: 142 MMOL/L — SIGNIFICANT CHANGE UP (ref 135–145)
SPECIMEN SOURCE: SIGNIFICANT CHANGE UP
SPECIMEN SOURCE: SIGNIFICANT CHANGE UP
WBC # BLD: 13.4 K/UL — HIGH (ref 3.8–10.5)
WBC # FLD AUTO: 13.4 K/UL — HIGH (ref 3.8–10.5)

## 2022-03-20 PROCEDURE — 99232 SBSQ HOSP IP/OBS MODERATE 35: CPT

## 2022-03-20 PROCEDURE — 71045 X-RAY EXAM CHEST 1 VIEW: CPT | Mod: 26

## 2022-03-20 RX ORDER — POTASSIUM CHLORIDE 20 MEQ
40 PACKET (EA) ORAL ONCE
Refills: 0 | Status: COMPLETED | OUTPATIENT
Start: 2022-03-20 | End: 2022-03-20

## 2022-03-20 RX ORDER — HEPARIN SODIUM 5000 [USP'U]/ML
1400 INJECTION INTRAVENOUS; SUBCUTANEOUS
Qty: 25000 | Refills: 0 | Status: DISCONTINUED | OUTPATIENT
Start: 2022-03-20 | End: 2022-03-28

## 2022-03-20 RX ORDER — HEPARIN SODIUM 5000 [USP'U]/ML
1500 INJECTION INTRAVENOUS; SUBCUTANEOUS
Qty: 25000 | Refills: 0 | Status: DISCONTINUED | OUTPATIENT
Start: 2022-03-20 | End: 2022-03-20

## 2022-03-20 RX ORDER — POTASSIUM CHLORIDE 20 MEQ
40 PACKET (EA) ORAL ONCE
Refills: 0 | Status: DISCONTINUED | OUTPATIENT
Start: 2022-03-20 | End: 2022-03-20

## 2022-03-20 RX ADMIN — CARVEDILOL PHOSPHATE 12.5 MILLIGRAM(S): 80 CAPSULE, EXTENDED RELEASE ORAL at 06:10

## 2022-03-20 RX ADMIN — ATORVASTATIN CALCIUM 40 MILLIGRAM(S): 80 TABLET, FILM COATED ORAL at 21:55

## 2022-03-20 RX ADMIN — Medication 1300 MILLIGRAM(S): at 06:10

## 2022-03-20 RX ADMIN — Medication 3 MILLILITER(S): at 06:11

## 2022-03-20 RX ADMIN — BUDESONIDE AND FORMOTEROL FUMARATE DIHYDRATE 2 PUFF(S): 160; 4.5 AEROSOL RESPIRATORY (INHALATION) at 00:20

## 2022-03-20 RX ADMIN — BUDESONIDE AND FORMOTEROL FUMARATE DIHYDRATE 2 PUFF(S): 160; 4.5 AEROSOL RESPIRATORY (INHALATION) at 21:53

## 2022-03-20 RX ADMIN — BUDESONIDE AND FORMOTEROL FUMARATE DIHYDRATE 2 PUFF(S): 160; 4.5 AEROSOL RESPIRATORY (INHALATION) at 08:54

## 2022-03-20 RX ADMIN — HEPARIN SODIUM 14 UNIT(S)/HR: 5000 INJECTION INTRAVENOUS; SUBCUTANEOUS at 19:27

## 2022-03-20 RX ADMIN — Medication 1300 MILLIGRAM(S): at 21:54

## 2022-03-20 RX ADMIN — Medication 1300 MILLIGRAM(S): at 13:04

## 2022-03-20 RX ADMIN — HEPARIN SODIUM 14 UNIT(S)/HR: 5000 INJECTION INTRAVENOUS; SUBCUTANEOUS at 13:19

## 2022-03-20 RX ADMIN — CARVEDILOL PHOSPHATE 12.5 MILLIGRAM(S): 80 CAPSULE, EXTENDED RELEASE ORAL at 17:45

## 2022-03-20 RX ADMIN — Medication 4 MILLIGRAM(S): at 21:54

## 2022-03-20 RX ADMIN — HEPARIN SODIUM 15 UNIT(S)/HR: 5000 INJECTION INTRAVENOUS; SUBCUTANEOUS at 06:21

## 2022-03-20 RX ADMIN — Medication 40 MILLIEQUIVALENT(S): at 17:47

## 2022-03-20 RX ADMIN — Medication 3 MILLILITER(S): at 00:20

## 2022-03-20 RX ADMIN — PANTOPRAZOLE SODIUM 40 MILLIGRAM(S): 20 TABLET, DELAYED RELEASE ORAL at 06:10

## 2022-03-20 RX ADMIN — CLOPIDOGREL BISULFATE 75 MILLIGRAM(S): 75 TABLET, FILM COATED ORAL at 13:04

## 2022-03-20 RX ADMIN — Medication 600 MILLIGRAM(S): at 06:11

## 2022-03-20 RX ADMIN — Medication 3 MILLILITER(S): at 17:44

## 2022-03-20 NOTE — PROGRESS NOTE ADULT - ASSESSMENT
76M w/ pmh of HTN, HLD, PVD s/p R AKA who was brought by EMS to [facility] after a mechanical fall from his wheelchair found to have CHF exacerbation, PNA as well as acute LLE arterial occlusions w/ unsuccessful angiogram. Stress test at OSH was positive, transferred for angiogram for cardiac clearance for possible LLE BKA

## 2022-03-20 NOTE — PROGRESS NOTE ADULT - PROBLEM SELECTOR PLAN 2
Improved- likely source LLE  Blood cultures (3/15) remain NGTD x 2  Start Zosyn if febrile for possible LLE source

## 2022-03-20 NOTE — PROGRESS NOTE ADULT - PROBLEM SELECTOR PLAN 1
[facility] completed, LM 10%, LAD 60%, Cx and RCA  with collaterals   Per cardiology continue medical management- will have cards f/u for operative risk stratification and optimization  Continue Coreg, Lipitor, Plavix

## 2022-03-20 NOTE — PROGRESS NOTE ADULT - SUBJECTIVE AND OBJECTIVE BOX
Ra Seo MD    Patient is a 76y old  Male who presents with a chief complaint of Cardiac clearance for ischemic limb (19 Mar 2022 14:48)        SUBJECTIVE / OVERNIGHT EVENTS: Continued LLE pain. Denies CP/SOB      MEDICATIONS  (STANDING):  albuterol/ipratropium for Nebulization 3 milliLiter(s) Nebulizer every 6 hours  atorvastatin 40 milliGRAM(s) Oral at bedtime  budesonide 160 MICROgram(s)/formoterol 4.5 MICROgram(s) Inhaler 2 Puff(s) Inhalation two times a day  carvedilol 12.5 milliGRAM(s) Oral every 12 hours  chlorhexidine 2% Cloths 1 Application(s) Topical <User Schedule>  chlorhexidine 4% Liquid 1 Application(s) Topical <User Schedule>  clopidogrel Tablet 75 milliGRAM(s) Oral daily  doxazosin 4 milliGRAM(s) Oral at bedtime  guaiFENesin  milliGRAM(s) Oral every 12 hours  heparin  Infusion 1400 Unit(s)/Hr (14 mL/Hr) IV Continuous <Continuous>  insulin lispro (ADMELOG) corrective regimen sliding scale   SubCutaneous three times a day before meals  pantoprazole    Tablet 40 milliGRAM(s) Oral before breakfast  potassium chloride    Tablet ER 40 milliEquivalent(s) Oral once  sodium bicarbonate 1300 milliGRAM(s) Oral three times a day    MEDICATIONS  (PRN):  acetaminophen     Tablet .. 650 milliGRAM(s) Oral every 6 hours PRN Moderate Pain (4 - 6)      Vital Signs Last 24 Hrs  T(C): 36.6 (20 Mar 2022 12:21), Max: 37.3 (19 Mar 2022 20:00)  T(F): 97.9 (20 Mar 2022 12:21), Max: 99.1 (19 Mar 2022 20:00)  HR: 90 (20 Mar 2022 12:21) (75 - 90)  BP: 152/80 (20 Mar 2022 12:21) (147/66 - 152/80)  BP(mean): --  RR: 18 (20 Mar 2022 12:21) (18 - 18)  SpO2: 95% (20 Mar 2022 12:21) (95% - 96%)  CAPILLARY BLOOD GLUCOSE      POCT Blood Glucose.: 138 mg/dL (20 Mar 2022 12:25)  POCT Blood Glucose.: 119 mg/dL (20 Mar 2022 08:28)  POCT Blood Glucose.: 122 mg/dL (19 Mar 2022 21:34)  POCT Blood Glucose.: 99 mg/dL (19 Mar 2022 16:48)    I&O's Summary    19 Mar 2022 07:01  -  20 Mar 2022 07:00  --------------------------------------------------------  IN: 216 mL / OUT: 100 mL / NET: 116 mL    20 Mar 2022 07:01  -  20 Mar 2022 14:36  --------------------------------------------------------  IN: 720 mL / OUT: 0 mL / NET: 720 mL          PHYSICAL EXAM  GENERAL: NAD, well-developed  HEAD:  Atraumatic, normocephalic  EYES: EOMI, PERRLA, conjunctiva and sclera clear  CHEST/LUNG: Clear to auscultation anteriorly; no wheezes  HEART: +S1+S2, regular rate and rhythm  ABDOMEN: Soft, nontender, mildly distended; bowel sounds present  EXTREMITIES:  R AKA; LLE cold to touch below knee, + edema  PSYCH: AAOx3    LABS:                        9.2    13.40 )-----------( 192      ( 20 Mar 2022 04:57 )             26.8     03-20    142  |  108  |  17  ----------------------------<  110<H>  3.4<L>   |  18<L>  |  1.63<H>    Ca    8.5      20 Mar 2022 04:57  Phos  2.9     03-20  Mg     1.8     03-20      PTT - ( 20 Mar 2022 12:37 )  PTT:93.9 sec            RADIOLOGY & ADDITIONAL TESTS:    Imaging Personally Reviewed:  Consultant(s) Notes Reviewed:    Care Discussed with Consultants/Other Providers:

## 2022-03-21 LAB
ANION GAP SERPL CALC-SCNC: 17 MMOL/L — SIGNIFICANT CHANGE UP (ref 5–17)
APTT BLD: 79 SEC — HIGH (ref 27.5–35.5)
BUN SERPL-MCNC: 18 MG/DL — SIGNIFICANT CHANGE UP (ref 7–23)
CALCIUM SERPL-MCNC: 8.6 MG/DL — SIGNIFICANT CHANGE UP (ref 8.4–10.5)
CHLORIDE SERPL-SCNC: 103 MMOL/L — SIGNIFICANT CHANGE UP (ref 96–108)
CO2 SERPL-SCNC: 17 MMOL/L — LOW (ref 22–31)
CREAT SERPL-MCNC: 1.65 MG/DL — HIGH (ref 0.5–1.3)
EGFR: 43 ML/MIN/1.73M2 — LOW
GLUCOSE BLDC GLUCOMTR-MCNC: 112 MG/DL — HIGH (ref 70–99)
GLUCOSE BLDC GLUCOMTR-MCNC: 116 MG/DL — HIGH (ref 70–99)
GLUCOSE BLDC GLUCOMTR-MCNC: 116 MG/DL — HIGH (ref 70–99)
GLUCOSE BLDC GLUCOMTR-MCNC: 127 MG/DL — HIGH (ref 70–99)
GLUCOSE SERPL-MCNC: 105 MG/DL — HIGH (ref 70–99)
MAGNESIUM SERPL-MCNC: 1.6 MG/DL — SIGNIFICANT CHANGE UP (ref 1.6–2.6)
NT-PROBNP SERPL-SCNC: 2265 PG/ML — HIGH (ref 0–300)
POTASSIUM SERPL-MCNC: 3 MMOL/L — LOW (ref 3.5–5.3)
POTASSIUM SERPL-SCNC: 3 MMOL/L — LOW (ref 3.5–5.3)
SODIUM SERPL-SCNC: 137 MMOL/L — SIGNIFICANT CHANGE UP (ref 135–145)

## 2022-03-21 PROCEDURE — 99232 SBSQ HOSP IP/OBS MODERATE 35: CPT

## 2022-03-21 RX ORDER — ASCORBIC ACID 60 MG
500 TABLET,CHEWABLE ORAL DAILY
Refills: 0 | Status: DISCONTINUED | OUTPATIENT
Start: 2022-03-21 | End: 2022-04-01

## 2022-03-21 RX ADMIN — CARVEDILOL PHOSPHATE 12.5 MILLIGRAM(S): 80 CAPSULE, EXTENDED RELEASE ORAL at 17:32

## 2022-03-21 RX ADMIN — HEPARIN SODIUM 14 UNIT(S)/HR: 5000 INJECTION INTRAVENOUS; SUBCUTANEOUS at 19:33

## 2022-03-21 RX ADMIN — Medication 1300 MILLIGRAM(S): at 05:03

## 2022-03-21 RX ADMIN — PANTOPRAZOLE SODIUM 40 MILLIGRAM(S): 20 TABLET, DELAYED RELEASE ORAL at 08:05

## 2022-03-21 RX ADMIN — Medication 600 MILLIGRAM(S): at 17:32

## 2022-03-21 RX ADMIN — Medication 3 MILLILITER(S): at 07:07

## 2022-03-21 RX ADMIN — Medication 600 MILLIGRAM(S): at 05:04

## 2022-03-21 RX ADMIN — Medication 3 MILLILITER(S): at 11:22

## 2022-03-21 RX ADMIN — CLOPIDOGREL BISULFATE 75 MILLIGRAM(S): 75 TABLET, FILM COATED ORAL at 16:18

## 2022-03-21 RX ADMIN — Medication 3 MILLILITER(S): at 00:08

## 2022-03-21 RX ADMIN — ATORVASTATIN CALCIUM 40 MILLIGRAM(S): 80 TABLET, FILM COATED ORAL at 21:46

## 2022-03-21 RX ADMIN — Medication 1300 MILLIGRAM(S): at 16:18

## 2022-03-21 RX ADMIN — BUDESONIDE AND FORMOTEROL FUMARATE DIHYDRATE 2 PUFF(S): 160; 4.5 AEROSOL RESPIRATORY (INHALATION) at 08:06

## 2022-03-21 RX ADMIN — Medication 4 MILLIGRAM(S): at 21:45

## 2022-03-21 RX ADMIN — CHLORHEXIDINE GLUCONATE 1 APPLICATION(S): 213 SOLUTION TOPICAL at 08:22

## 2022-03-21 RX ADMIN — Medication 3 MILLILITER(S): at 17:32

## 2022-03-21 RX ADMIN — Medication 1300 MILLIGRAM(S): at 21:45

## 2022-03-21 RX ADMIN — BUDESONIDE AND FORMOTEROL FUMARATE DIHYDRATE 2 PUFF(S): 160; 4.5 AEROSOL RESPIRATORY (INHALATION) at 21:44

## 2022-03-21 RX ADMIN — CARVEDILOL PHOSPHATE 12.5 MILLIGRAM(S): 80 CAPSULE, EXTENDED RELEASE ORAL at 05:03

## 2022-03-21 NOTE — PROGRESS NOTE ADULT - SUBJECTIVE AND OBJECTIVE BOX
SUBJ:  No acute events overnight. Underwent LHC with severe CAD to be medically managed. Denies chest pain and shortness of breath. Transfer back to [place] was declined therefore will have left BKA at [facility].     MEDICATIONS  (STANDING):  albuterol/ipratropium for Nebulization 3 milliLiter(s) Nebulizer every 6 hours  ascorbic acid 500 milliGRAM(s) Oral daily  atorvastatin 40 milliGRAM(s) Oral at bedtime  budesonide 160 MICROgram(s)/formoterol 4.5 MICROgram(s) Inhaler 2 Puff(s) Inhalation two times a day  carvedilol 12.5 milliGRAM(s) Oral every 12 hours  chlorhexidine 2% Cloths 1 Application(s) Topical <User Schedule>  chlorhexidine 4% Liquid 1 Application(s) Topical <User Schedule>  clopidogrel Tablet 75 milliGRAM(s) Oral daily  doxazosin 4 milliGRAM(s) Oral at bedtime  guaiFENesin  milliGRAM(s) Oral every 12 hours  heparin  Infusion 1400 Unit(s)/Hr (14 mL/Hr) IV Continuous <Continuous>  insulin lispro (ADMELOG) corrective regimen sliding scale   SubCutaneous three times a day before meals  Nephro-carlton 1 Tablet(s) Oral daily  pantoprazole    Tablet 40 milliGRAM(s) Oral before breakfast  sodium bicarbonate 1300 milliGRAM(s) Oral three times a day    MEDICATIONS  (PRN):  acetaminophen     Tablet .. 650 milliGRAM(s) Oral every 6 hours PRN Moderate Pain (4 - 6)    Vital Signs Last 24 Hrs  T(C): 36.5 (21 Mar 2022 10:52), Max: 37.1 (20 Mar 2022 20:47)  T(F): 97.7 (21 Mar 2022 10:52), Max: 98.8 (20 Mar 2022 20:47)  HR: 98 (21 Mar 2022 10:52) (73 - 99)  BP: 138/73 (21 Mar 2022 10:52) (135/66 - 142/65)  RR: 18 (21 Mar 2022 10:52) (18 - 18)  SpO2: 95% (21 Mar 2022 10:52) (95% - 95%)    REVIEW OF SYSTEMS:  As per HPI, otherwise unremarkable.     PHYSICAL EXAM:  Constitutional/Appearance: Normal, Well-developed  HEENT:   Normal oral mucosa, no drainage or redness, supple neck  Lymphatic: No lymphadenopathy  Cardiovascular: Normal S1 S2, RRR  Respiratory: Lungs clear to auscultation, respirations non-labored  Psychiatry: A & O x 3   Gastrointestinal:  Soft, Non-tender, no distention  Skin: No rashes, No ecchymoses, No cyanosis	  Neurologic: Non-focal  Extremities: Limited range of motion.   Vascular: Peripheral pulses palpable 2+ bilaterally (radial)    LABS:  CBC Full  -  ( 20 Mar 2022 04:57 )  WBC Count : 13.40 K/uL  RBC Count : 3.53 M/uL  Hemoglobin : 9.2 g/dL  Hematocrit : 26.8 %  Platelet Count - Automated : 192 K/uL  Mean Cell Volume : 75.9 fl  Mean Cell Hemoglobin : 26.1 pg  Mean Cell Hemoglobin Concentration : 34.3 gm/dL    03-20    142  |  108  |  17  ----------------------------<  110<H>  3.4<L>   |  18<L>  |  1.63<H>    Ca    8.5      20 Mar 2022 04:57  Phos  2.9     03-20  Mg     1.8     03-20    RADIOLOGY & ADDITIONAL STUDIES:  Cardiac Cath 3/16/2022  1.    Arterial Access - Right Radial   2.    Left Heart Cath   3.    Diagnostic Coronary Angiography     Diagnostic Conclusions:   Diagnostic angiogram showed severe CAD in the LCx and RCA. Both  vessels are CTOs with good collateral blood flow. The LAD  has a moderate disease.      Recommendations:   Aggressive medical therapy.  Recommend medical management.        Diagnostic Findings:   Coronary Angiography   The coronary circulation is right dominant.      LM   Left main artery: There is a 10 % stenosis in the distal third portion  of the segment.    LAD   Mid left anterior descending: The segment is severely calcified. There  is a 60 % stenosis.    CX   Proximal circumflex: There is a 60 % stenosis. Mid circumflex: The  segment is moderately calcified. There is a 100 %  stenosis. There is good collateral blood supply to the distal  myocardium. This lesion is a chronic total occlusion.    RCA   Right coronary artery: There is a 100 % stenosis in the ostium portion  of the segment. There is good collateral blood supply to  the distal myocardium. This lesion is a chronic total occlusion.      ECHO 3/12/2022  Conclusions:  1. Tethered mitral valve leaflets with normal opening. Mild  mitral regurgitation.  2. Aortic valve not well visualized; appears to be a  calcified trileaflet valve with normal opening. No aortic  valve regurgitation seen.  3. Endocardial visualization enhanced with intravenous  injection of Ultrasonic Enhancing Agent (Definity).  Moderate segmental left ventricular systolic dysfunction.  The inferolateral, basal inferior, and basal septal walls  are akinetic. The anterolateral and apical walls are  hypokinetic. No left ventricular thrombus.  4. Normal right ventricular size and function.  5. Trace pericardial effusion.  *** No previous Echo exam.    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     1) Pre-operative/procedural Optimization  RCRI 4; 15% 30-day risk of MI, cardiac arrest, and/or death [CAD, CHF, insulin]  Functional status METs <4  No absolute contraindications including active chest pain, decompensated heart failure, and/or life threatening arrythmia.  ECHO with LV dysfunction 35%.  Cardiac cath with severe CAD;  of LCx and RCA with good collateral blood flow and moderate disease mLAD; no intervention performed.  Asymptomatic.     ·	High risk patient undergoing intermediate risk surgery/procedure OPTIMIZED to the lowest risk predicted by the RCRI base on co-morbidities and nature of operation/procedure.   ·	No further cardiac testing indicated.   ·	Avoid large volume shifts given his reduced EF and large blood pressure variability (particular hypotension) to maintain the integrity of the coronary collaterals.   ·	Continue carvedilol 12.5 mg BID, atorvastatin 40 mg nightly, and Clopidogrel 75 ng daily (or alternatively aspirin 81 mg daily).     Pre-surgical workup at OSH included  nuclear stress test which was abnormal, thus transferred for cardiac catheterization and evaluation prior to planned BKA.    2) CAD   s/p [facility] with severe CAD;  of LCx and RCA with good collateral blood flow and moderate disease mLAD; no intervention performed.  Asymptomatic.   ECHO with LV dysfunction 35%; not on optimal GDMT yet.    ·	Continue medical management with carvedilol 12.5 mg BID, atorvastatin 40 mg nightly, and Clopidogrel 75 ng daily (or alternatively aspirin 81 mg daily).     3) CHF   ECHO with LV dysfunction 35%; not on optimal GDMT yet.  Admission pBNP ~5750 decreased to ~2250 with diuresis.     ·	Post surgery optimization of heart failure regimen including hydralazine 25 mg q8h and isordil dinitrate 10 mg q8h as BP tolerates.     4) HTN   Suboptimal    ·	Plan as above.     Hank Mckinney MD, MPH, KELLEY, RPVI, [facility]  Inpatient Cardiovascular Specialist; Marlyn Biswas [facility], [facility] ([facility])  ; [employer] at [facility]  message: [employer] [phone] or Microsoft Teams (text preferred and/or call)  email: [email or web address] Cardiology and Cardiovascular Surgery on-service contact/call information, go to [email or web address] and use "[email or web address]" to login.  Outpatient Cardiology appointments, call  [phone] to arrange with a colleague; I do not have outpatient Cardiology clinic.    SUBJ:  No acute events overnight. Underwent LHC with severe CAD to be medically managed. Denies chest pain and shortness of breath. Transfer back to [place] was declined therefore will have left BKA at [facility].     MEDICATIONS  (STANDING):  albuterol/ipratropium for Nebulization 3 milliLiter(s) Nebulizer every 6 hours  ascorbic acid 500 milliGRAM(s) Oral daily  atorvastatin 40 milliGRAM(s) Oral at bedtime  budesonide 160 MICROgram(s)/formoterol 4.5 MICROgram(s) Inhaler 2 Puff(s) Inhalation two times a day  carvedilol 12.5 milliGRAM(s) Oral every 12 hours  chlorhexidine 2% Cloths 1 Application(s) Topical <User Schedule>  chlorhexidine 4% Liquid 1 Application(s) Topical <User Schedule>  clopidogrel Tablet 75 milliGRAM(s) Oral daily  doxazosin 4 milliGRAM(s) Oral at bedtime  guaiFENesin  milliGRAM(s) Oral every 12 hours  heparin  Infusion 1400 Unit(s)/Hr (14 mL/Hr) IV Continuous <Continuous>  insulin lispro (ADMELOG) corrective regimen sliding scale   SubCutaneous three times a day before meals  Nephro-carlton 1 Tablet(s) Oral daily  pantoprazole    Tablet 40 milliGRAM(s) Oral before breakfast  sodium bicarbonate 1300 milliGRAM(s) Oral three times a day    MEDICATIONS  (PRN):  acetaminophen     Tablet .. 650 milliGRAM(s) Oral every 6 hours PRN Moderate Pain (4 - 6)    Vital Signs Last 24 Hrs  T(C): 36.5 (21 Mar 2022 10:52), Max: 37.1 (20 Mar 2022 20:47)  T(F): 97.7 (21 Mar 2022 10:52), Max: 98.8 (20 Mar 2022 20:47)  HR: 98 (21 Mar 2022 10:52) (73 - 99)  BP: 138/73 (21 Mar 2022 10:52) (135/66 - 142/65)  RR: 18 (21 Mar 2022 10:52) (18 - 18)  SpO2: 95% (21 Mar 2022 10:52) (95% - 95%)    REVIEW OF SYSTEMS:  As per HPI, otherwise unremarkable.     PHYSICAL EXAM:  Constitutional/Appearance: Normal, Well-developed  HEENT:   Normal oral mucosa, no drainage or redness, supple neck  Lymphatic: No lymphadenopathy  Cardiovascular: Normal S1 S2, RRR  Respiratory: Lungs clear to auscultation, respirations non-labored  Psychiatry: A & O x 3   Gastrointestinal:  Soft, Non-tender, no distention  Skin: No rashes, No ecchymoses, No cyanosis	  Neurologic: Non-focal  Extremities: Limited range of motion.   Vascular: Peripheral pulses palpable 2+ bilaterally (radial)    LABS:  CBC Full  -  ( 20 Mar 2022 04:57 )  WBC Count : 13.40 K/uL  RBC Count : 3.53 M/uL  Hemoglobin : 9.2 g/dL  Hematocrit : 26.8 %  Platelet Count - Automated : 192 K/uL  Mean Cell Volume : 75.9 fl  Mean Cell Hemoglobin : 26.1 pg  Mean Cell Hemoglobin Concentration : 34.3 gm/dL    03-20    142  |  108  |  17  ----------------------------<  110<H>  3.4<L>   |  18<L>  |  1.63<H>    Ca    8.5      20 Mar 2022 04:57  Phos  2.9     03-20  Mg     1.8     03-20    RADIOLOGY & ADDITIONAL STUDIES:  Cardiac Cath 3/16/2022  1.    Arterial Access - Right Radial   2.    Left Heart Cath   3.    Diagnostic Coronary Angiography     Diagnostic Conclusions:   Diagnostic angiogram showed severe CAD in the LCx and RCA. Both  vessels are CTOs with good collateral blood flow. The LAD  has a moderate disease.      Recommendations:   Aggressive medical therapy.  Recommend medical management.        Diagnostic Findings:   Coronary Angiography   The coronary circulation is right dominant.      LM   Left main artery: There is a 10 % stenosis in the distal third portion  of the segment.    LAD   Mid left anterior descending: The segment is severely calcified. There  is a 60 % stenosis.    CX   Proximal circumflex: There is a 60 % stenosis. Mid circumflex: The  segment is moderately calcified. There is a 100 %  stenosis. There is good collateral blood supply to the distal  myocardium. This lesion is a chronic total occlusion.    RCA   Right coronary artery: There is a 100 % stenosis in the ostium portion  of the segment. There is good collateral blood supply to  the distal myocardium. This lesion is a chronic total occlusion.      ECHO 3/12/2022  Conclusions:  1. Tethered mitral valve leaflets with normal opening. Mild  mitral regurgitation.  2. Aortic valve not well visualized; appears to be a  calcified trileaflet valve with normal opening. No aortic  valve regurgitation seen.  3. Endocardial visualization enhanced with intravenous  injection of Ultrasonic Enhancing Agent (Definity).  Moderate segmental left ventricular systolic dysfunction.  The inferolateral, basal inferior, and basal septal walls  are akinetic. The anterolateral and apical walls are  hypokinetic. No left ventricular thrombus.  4. Normal right ventricular size and function.  5. Trace pericardial effusion.  *** No previous Echo exam.    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     1) Pre-operative/procedural Optimization  RCRI 4; 15% 30-day risk of MI, cardiac arrest, and/or death [CAD, CHF, insulin]  Functional status METs <4  No absolute contraindications including active chest pain, decompensated heart failure, and/or life threatening arrythmia.  ECHO with LV dysfunction 35%.  Cardiac cath with severe CAD;  of LCx and RCA with good collateral blood flow and moderate disease mLAD; no intervention performed.  Asymptomatic.     ·	High risk patient undergoing intermediate risk surgery/procedure OPTIMIZED to the lowest risk predicted by the RCRI base on co-morbidities and nature of operation/procedure.   ·	No further cardiac testing indicated.   ·	Avoid large volume shifts given his reduced EF and large blood pressure variability (particular hypotension) to maintain the integrity of the coronary collaterals.   ·	Continue carvedilol 12.5 mg BID, atorvastatin 40 mg nightly, and Clopidogrel 75 ng daily (or alternatively aspirin 81 mg daily).     2) CAD   s/p [facility] with severe CAD;  of LCx and RCA with good collateral blood flow and moderate disease mLAD; no intervention performed.  Asymptomatic.   ECHO with LV dysfunction 35%; not on optimal GDMT yet.    ·	Continue medical management with carvedilol 12.5 mg BID, atorvastatin 40 mg nightly, and Clopidogrel 75 ng daily (or alternatively aspirin 81 mg daily).     3) CHF   ECHO with LV dysfunction 35%; not on optimal GDMT yet.  Admission pBNP ~5750 decreased to ~2250 with diuresis.     ·	Post surgery optimization of heart failure regimen including hydralazine 25 mg q8h and isordil dinitrate 10 mg q8h as BP tolerates.     4) HTN   Suboptimal    ·	Plan as above.     Hank Mckinney MD, MPH, KELLEY, RPVI, [facility]  Inpatient Cardiovascular Specialist; Marlyn Biswas [facility], [facility] ([facility])  ; [employer] at [facility]  message: [employer] [phone] or Microsoft Teams (text preferred and/or call)  email: [email or web address] Cardi[facility] Surgery on-service contact/call information, go to [email or web address] and use "[email or web address]" to login.  Outpatient Cardiology appointments, call  [phone] to arrange with a colleague; I do not have outpatient Cardiology clinic.

## 2022-03-21 NOTE — PROGRESS NOTE ADULT - PROBLEM SELECTOR PLAN 3
Baseline unknown but improved  Renal sono with decreased echogenicty c/w parenchymal disease  Continue Sodium Bicarb  Hypernatremia- resolved- encourage free water intake  Hypokalemia- replete

## 2022-03-21 NOTE — DIETITIAN NUTRITION RISK NOTIFICATION - TREATMENT: THE FOLLOWING DIET HAS BEEN RECOMMENDED
Diet, Consistent Carbohydrate w/Evening Snack:   DASH/TLC {Sodium & Cholesterol Restricted} (DASH)  Pureed (PUREED)  Mahin(7 Gm Arginine/7 Gm Glut/1.2 Gm HMB     Qty per Day:  2  Supplement Feeding Modality:  Oral (03-21-22 @ 11:20) [Pending Verification By Attending]  Diet, Pureed:   Consistent Carbohydrate {Evening Snack} (CSTCHOSN)  DASH/TLC {Sodium & Cholesterol Restricted} (DASH) (03-14-22 @ 16:01) [Active]

## 2022-03-21 NOTE — PROVIDER CONTACT NOTE (OTHER) - ACTION/TREATMENT ORDERED:
ESTHER Ch recommended to pass this along to the day shift team.
NP notified, BMP ordered. Will send labs and continue to monitor.
Provider notified, no further intervention at this time. will continue to monitor pt

## 2022-03-21 NOTE — DIETITIAN INITIAL EVALUATION ADULT. - OTHER INFO
No nausea/vomit, difficulty swallow, diarrhea/constipation noted  Last BM : yesterday  NKFA   Ht: 67"  Ht taken from pt  Dosing ht: 154.2cm  Dosing wt: 77.6kg  Ht used for BMI 67"  Wt used for .1pounds-curretn flow sheet weight  Education Provided : pt was educated on the importance of supplements to increase calorie and protein intake in light of RD's nutritional findings.   pressure injury: sacrum stage 2

## 2022-03-21 NOTE — PROGRESS NOTE ADULT - SUBJECTIVE AND OBJECTIVE BOX
Ra Seo MD    Patient is a 76y old  Male who presents with a chief complaint of Cardiac clearance for ischemic limb (21 Mar 2022 10:20)        SUBJECTIVE / OVERNIGHT EVENTS: Denies CP/SOB. Patient and RN reporting diarrhea  TELEMETRY: SR 70-80's PVCs, PACs      MEDICATIONS  (STANDING):  albuterol/ipratropium for Nebulization 3 milliLiter(s) Nebulizer every 6 hours  atorvastatin 40 milliGRAM(s) Oral at bedtime  budesonide 160 MICROgram(s)/formoterol 4.5 MICROgram(s) Inhaler 2 Puff(s) Inhalation two times a day  carvedilol 12.5 milliGRAM(s) Oral every 12 hours  chlorhexidine 2% Cloths 1 Application(s) Topical <User Schedule>  chlorhexidine 4% Liquid 1 Application(s) Topical <User Schedule>  clopidogrel Tablet 75 milliGRAM(s) Oral daily  doxazosin 4 milliGRAM(s) Oral at bedtime  guaiFENesin  milliGRAM(s) Oral every 12 hours  heparin  Infusion 1400 Unit(s)/Hr (14 mL/Hr) IV Continuous <Continuous>  insulin lispro (ADMELOG) corrective regimen sliding scale   SubCutaneous three times a day before meals  pantoprazole    Tablet 40 milliGRAM(s) Oral before breakfast  sodium bicarbonate 1300 milliGRAM(s) Oral three times a day    MEDICATIONS  (PRN):  acetaminophen     Tablet .. 650 milliGRAM(s) Oral every 6 hours PRN Moderate Pain (4 - 6)      Vital Signs Last 24 Hrs  T(C): 36.5 (21 Mar 2022 10:52), Max: 37.1 (20 Mar 2022 20:47)  T(F): 97.7 (21 Mar 2022 10:52), Max: 98.8 (20 Mar 2022 20:47)  HR: 98 (21 Mar 2022 10:52) (73 - 99)  BP: 138/73 (21 Mar 2022 10:52) (135/66 - 152/80)  BP(mean): --  RR: 18 (21 Mar 2022 10:52) (18 - 18)  SpO2: 95% (21 Mar 2022 10:52) (95% - 95%)  CAPILLARY BLOOD GLUCOSE      POCT Blood Glucose.: 116 mg/dL (21 Mar 2022 07:51)  POCT Blood Glucose.: 113 mg/dL (20 Mar 2022 21:46)  POCT Blood Glucose.: 131 mg/dL (20 Mar 2022 16:50)  POCT Blood Glucose.: 138 mg/dL (20 Mar 2022 12:25)    I&O's Summary    20 Mar 2022 07:01  -  21 Mar 2022 07:00  --------------------------------------------------------  IN: 840 mL / OUT: 500 mL / NET: 340 mL    21 Mar 2022 07:01  -  21 Mar 2022 11:21  --------------------------------------------------------  IN: 120 mL / OUT: 400 mL / NET: -280 mL          PHYSICAL EXAM  GENERAL: NAD, well-developed  HEAD:  Atraumatic, normocephalic  EYES: EOMI, PERRLA, conjunctiva and sclera clear  CHEST/LUNG: Clear to auscultation anteriorly; no wheezes  HEART: +S1+S2, regular rate and rhythm  ABDOMEN: Soft, nontender, mildly distended; bowel sounds present  EXTREMITIES:  R AKA; LLE cold to touch below knee, + edema  PSYCH: AAOx3      LABS:                        9.2    13.40 )-----------( 192      ( 20 Mar 2022 04:57 )             26.8     03-20    142  |  108  |  17  ----------------------------<  110<H>  3.4<L>   |  18<L>  |  1.63<H>    Ca    8.5      20 Mar 2022 04:57  Phos  2.9     03-20  Mg     1.8     03-20      PTT - ( 21 Mar 2022 07:31 )  PTT:79.0 sec            RADIOLOGY & ADDITIONAL TESTS:    Imaging Personally Reviewed:  Consultant(s) Notes Reviewed:    Care Discussed with Consultants/Other Providers:

## 2022-03-21 NOTE — DIETITIAN INITIAL EVALUATION ADULT. - ORAL INTAKE PTA/DIET HISTORY
coffee for breakfast, eats lunch at a center-he could not provided details, prepares own dinner, including pork chops-he has dentures

## 2022-03-21 NOTE — PROGRESS NOTE ADULT - SUBJECTIVE AND OBJECTIVE BOX
VASCULAR SURGERY PROGRESS NOTE   ___________________________________________________________________    THI HASTINGS | 76y Male   [facility] 2DSU 254 D1   1945 | [id number]     LOS 10d    Attending: Ra Seo    ___________________________________________________________________      Allergies:  NKDA    OBJECTIVE:  Vitals:    T(C): 37.1 (22 @ 05:58), Max: 37.1 (22 @ 20:47)  HR: 90 (22 @ 05:58) (73 - 99)  BP: 142/65 (22 @ 05:58) (135/66 - 152/80)  RR: 18 (22 @ 05:58) (18 - 18)  SpO2: 95% (22 @ 05:58) (95% - 95%)      OUT:    Incontinent per Condom Catheter (mL): 500 mL  Total OUT: 500 mL          Medications:  albuterol/ipratropium for Nebulization 3 milliLiter(s) Nebulizer every 6 hours  budesonide 160 MICROgram(s)/formoterol 4.5 MICROgram(s) Inhaler 2 Puff(s) Inhalation two times a day  carvedilol 12.5 milliGRAM(s) Oral every 12 hours  doxazosin 4 milliGRAM(s) Oral at bedtime  guaiFENesin  milliGRAM(s) Oral every 12 hours  pantoprazole    Tablet 40 milliGRAM(s) Oral before breakfast          Laboratory:  WBC: 13.40 H&H: 9.2/26.8 Plt: 192  WBC: 15.89 H&H: 9.2/27.4 Plt: 250    Chemistry:                               Phos: 2.9 M.8  142  |  108  |  17  ----------------------------<  110  3.4   |  18  |  1.63        , 03-19                             Phos: xx M.6  140  |  109  |  19  ----------------------------<  94  3.1   |  18  |  1.71          PTT 79.3 PT/INR ---/---  CARDIAC MARKERS ( 21 Mar 2022 06:29 )  x     / x     / x     / x     / x      High Sensitivity Troponin:x      Serum Pro-BNP: 2265 pg/mL  ----------          Reviewed laboratory and imaging    clopidogrel Tablet 75 Oral daily  heparin  Infusion 1400 IV Continuous <Continuous>      COVID-19 PCR: NotDetec (18 Mar 2022 12:23)

## 2022-03-21 NOTE — PROGRESS NOTE ADULT - PROBLEM SELECTOR PLAN 1
LHC completed, LM 10%, LAD 60%, Cx and RCA  with collaterals   Per cardiology continue medical management  Cardiology f/u today for operative risk stratification and optimization  Continue Coreg, Lipitor, Plavix

## 2022-03-21 NOTE — PROVIDER CONTACT NOTE (OTHER) - ASSESSMENT
PT IS A&O 3  DENIES CP SOB  OR ANY DISTRESS  VSS
Pt a&o 2-3  VSS , asymptomic no signs  of CP, SOB,  Currently on Heparin drip 13 ml/hr
VSS. Patient asymptomatic.

## 2022-03-21 NOTE — PROVIDER CONTACT NOTE (OTHER) - SITUATION
PTT result 49.7
Pt had 6 beats of WCT as per tele monitor/ tech
Pt had increase of PAT in 150 for 30 seconds , all other events were between 3.7

## 2022-03-21 NOTE — DIETITIAN INITIAL EVALUATION ADULT. - PERTINENT MEDS FT
MEDICATIONS  (STANDING):  albuterol/ipratropium for Nebulization 3 milliLiter(s) Nebulizer every 6 hours  atorvastatin 40 milliGRAM(s) Oral at bedtime  budesonide 160 MICROgram(s)/formoterol 4.5 MICROgram(s) Inhaler 2 Puff(s) Inhalation two times a day  carvedilol 12.5 milliGRAM(s) Oral every 12 hours  chlorhexidine 2% Cloths 1 Application(s) Topical <User Schedule>  chlorhexidine 4% Liquid 1 Application(s) Topical <User Schedule>  clopidogrel Tablet 75 milliGRAM(s) Oral daily  doxazosin 4 milliGRAM(s) Oral at bedtime  guaiFENesin  milliGRAM(s) Oral every 12 hours  heparin  Infusion 1400 Unit(s)/Hr (14 mL/Hr) IV Continuous <Continuous>  insulin lispro (ADMELOG) corrective regimen sliding scale   SubCutaneous three times a day before meals  pantoprazole    Tablet 40 milliGRAM(s) Oral before breakfast  sodium bicarbonate 1300 milliGRAM(s) Oral three times a day    MEDICATIONS  (PRN):  acetaminophen     Tablet .. 650 milliGRAM(s) Oral every 6 hours PRN Moderate Pain (4 - 6)

## 2022-03-21 NOTE — PROVIDER CONTACT NOTE (OTHER) - BACKGROUND
dx Non-STEMI   PMH s/p BKA , PVD, HLD, HTN
dx; NSTEMI   Hx of HTN, hypokalemia BKA, BAUDILIO chf
CHF Exacerbation

## 2022-03-21 NOTE — DIETITIAN INITIAL EVALUATION ADULT. - OTHER CALCULATIONS
needs based on current flow sheet weight, fluid deferred to MD due to HF, protein based on increased needs due to stage 2

## 2022-03-21 NOTE — PROGRESS NOTE ADULT - PROBLEM SELECTOR PLAN 2
Improved- likely source LLE  Blood cultures (3/15) remain NGTD x 2  Given diarrhea will check C diff  Start Zosyn if febrile for possible LLE source

## 2022-03-21 NOTE — PROGRESS NOTE ADULT - ASSESSMENT
76M w/ pmh of HTN, HLD, PVD s/p R AKA who was brought by EMS to [facility] after a mechanical fall from his wheelchair found to have CHF exacerbation, PNA as well as acute LLE arterial occlusions w/ unsuccessful angiogram. Stress test at OSH was positive, transferred for angiogram for cardiac clearance for possible LLE BKA

## 2022-03-21 NOTE — PROGRESS NOTE ADULT - ASSESSMENT
76M w/ hx of HTN, HLD, PAD, right AKA, transferred from [facility] for cardiac cath after he underwent a LLE angiogram and had ST elevation during the procedure. Since transfer patient underwent diagnostic cardiac catheterization showing severe CAD in the LCx and RCA. Following discussion of GOC decision made to undergo amputation.     Recommendation   - Left leg is nonviable below the knee. No signs of infection.  - Patient is very high risk for any intervention.   - Please document medical risk stratification and clearance  - Please document cardiology risk stratification and clearance   - Will plan for OR with Dr. Sterling for AKA pending medical documentation of risk stratification and optimization  - Will follow with you     Enoch Sparks MD PGY2  Vascular Surgery Team  [phone]

## 2022-03-22 LAB
ANION GAP SERPL CALC-SCNC: 15 MMOL/L — SIGNIFICANT CHANGE UP (ref 5–17)
ANION GAP SERPL CALC-SCNC: 16 MMOL/L — SIGNIFICANT CHANGE UP (ref 5–17)
APTT BLD: 65.1 SEC — HIGH (ref 27.5–35.5)
BUN SERPL-MCNC: 17 MG/DL — SIGNIFICANT CHANGE UP (ref 7–23)
BUN SERPL-MCNC: 17 MG/DL — SIGNIFICANT CHANGE UP (ref 7–23)
CALCIUM SERPL-MCNC: 8.6 MG/DL — SIGNIFICANT CHANGE UP (ref 8.4–10.5)
CALCIUM SERPL-MCNC: 8.7 MG/DL — SIGNIFICANT CHANGE UP (ref 8.4–10.5)
CHLORIDE SERPL-SCNC: 104 MMOL/L — SIGNIFICANT CHANGE UP (ref 96–108)
CHLORIDE SERPL-SCNC: 104 MMOL/L — SIGNIFICANT CHANGE UP (ref 96–108)
CO2 SERPL-SCNC: 17 MMOL/L — LOW (ref 22–31)
CO2 SERPL-SCNC: 18 MMOL/L — LOW (ref 22–31)
CREAT SERPL-MCNC: 1.66 MG/DL — HIGH (ref 0.5–1.3)
CREAT SERPL-MCNC: 1.69 MG/DL — HIGH (ref 0.5–1.3)
EGFR: 42 ML/MIN/1.73M2 — LOW
EGFR: 43 ML/MIN/1.73M2 — LOW
GLUCOSE BLDC GLUCOMTR-MCNC: 117 MG/DL — HIGH (ref 70–99)
GLUCOSE BLDC GLUCOMTR-MCNC: 120 MG/DL — HIGH (ref 70–99)
GLUCOSE BLDC GLUCOMTR-MCNC: 121 MG/DL — HIGH (ref 70–99)
GLUCOSE BLDC GLUCOMTR-MCNC: 99 MG/DL — SIGNIFICANT CHANGE UP (ref 70–99)
GLUCOSE SERPL-MCNC: 105 MG/DL — HIGH (ref 70–99)
GLUCOSE SERPL-MCNC: 116 MG/DL — HIGH (ref 70–99)
HCT VFR BLD CALC: 30.1 % — LOW (ref 39–50)
HGB BLD-MCNC: 9.8 G/DL — LOW (ref 13–17)
MAGNESIUM SERPL-MCNC: 1.6 MG/DL — SIGNIFICANT CHANGE UP (ref 1.6–2.6)
MAGNESIUM SERPL-MCNC: 2.2 MG/DL — SIGNIFICANT CHANGE UP (ref 1.6–2.6)
MCHC RBC-ENTMCNC: 25.7 PG — LOW (ref 27–34)
MCHC RBC-ENTMCNC: 32.6 GM/DL — SIGNIFICANT CHANGE UP (ref 32–36)
MCV RBC AUTO: 79 FL — LOW (ref 80–100)
NRBC # BLD: 0 /100 WBCS — SIGNIFICANT CHANGE UP (ref 0–0)
PHOSPHATE SERPL-MCNC: 3.3 MG/DL — SIGNIFICANT CHANGE UP (ref 2.5–4.5)
PLATELET # BLD AUTO: 193 K/UL — SIGNIFICANT CHANGE UP (ref 150–400)
POTASSIUM SERPL-MCNC: 3 MMOL/L — LOW (ref 3.5–5.3)
POTASSIUM SERPL-MCNC: 3.4 MMOL/L — LOW (ref 3.5–5.3)
POTASSIUM SERPL-SCNC: 3 MMOL/L — LOW (ref 3.5–5.3)
POTASSIUM SERPL-SCNC: 3.4 MMOL/L — LOW (ref 3.5–5.3)
RBC # BLD: 3.81 M/UL — LOW (ref 4.2–5.8)
RBC # FLD: 16.8 % — HIGH (ref 10.3–14.5)
SODIUM SERPL-SCNC: 137 MMOL/L — SIGNIFICANT CHANGE UP (ref 135–145)
SODIUM SERPL-SCNC: 137 MMOL/L — SIGNIFICANT CHANGE UP (ref 135–145)
WBC # BLD: 11.31 K/UL — HIGH (ref 3.8–10.5)
WBC # FLD AUTO: 11.31 K/UL — HIGH (ref 3.8–10.5)

## 2022-03-22 PROCEDURE — 99232 SBSQ HOSP IP/OBS MODERATE 35: CPT | Mod: GC

## 2022-03-22 PROCEDURE — 99232 SBSQ HOSP IP/OBS MODERATE 35: CPT

## 2022-03-22 RX ORDER — POTASSIUM CHLORIDE 20 MEQ
40 PACKET (EA) ORAL EVERY 4 HOURS
Refills: 0 | Status: DISCONTINUED | OUTPATIENT
Start: 2022-03-22 | End: 2022-03-22

## 2022-03-22 RX ORDER — POTASSIUM CHLORIDE 20 MEQ
40 PACKET (EA) ORAL ONCE
Refills: 0 | Status: COMPLETED | OUTPATIENT
Start: 2022-03-22 | End: 2022-03-22

## 2022-03-22 RX ORDER — POTASSIUM CHLORIDE 20 MEQ
40 PACKET (EA) ORAL EVERY 4 HOURS
Refills: 0 | Status: COMPLETED | OUTPATIENT
Start: 2022-03-22 | End: 2022-03-22

## 2022-03-22 RX ORDER — MAGNESIUM SULFATE 500 MG/ML
2 VIAL (ML) INJECTION ONCE
Refills: 0 | Status: COMPLETED | OUTPATIENT
Start: 2022-03-22 | End: 2022-03-22

## 2022-03-22 RX ADMIN — Medication 3 MILLILITER(S): at 12:46

## 2022-03-22 RX ADMIN — Medication 40 MILLIEQUIVALENT(S): at 01:36

## 2022-03-22 RX ADMIN — CHLORHEXIDINE GLUCONATE 1 APPLICATION(S): 213 SOLUTION TOPICAL at 08:09

## 2022-03-22 RX ADMIN — CARVEDILOL PHOSPHATE 12.5 MILLIGRAM(S): 80 CAPSULE, EXTENDED RELEASE ORAL at 17:42

## 2022-03-22 RX ADMIN — Medication 3 MILLILITER(S): at 05:35

## 2022-03-22 RX ADMIN — Medication 1 TABLET(S): at 12:43

## 2022-03-22 RX ADMIN — ATORVASTATIN CALCIUM 40 MILLIGRAM(S): 80 TABLET, FILM COATED ORAL at 22:20

## 2022-03-22 RX ADMIN — Medication 600 MILLIGRAM(S): at 17:42

## 2022-03-22 RX ADMIN — Medication 40 MILLIEQUIVALENT(S): at 12:42

## 2022-03-22 RX ADMIN — CLOPIDOGREL BISULFATE 75 MILLIGRAM(S): 75 TABLET, FILM COATED ORAL at 12:43

## 2022-03-22 RX ADMIN — HEPARIN SODIUM 14 UNIT(S)/HR: 5000 INJECTION INTRAVENOUS; SUBCUTANEOUS at 04:55

## 2022-03-22 RX ADMIN — Medication 600 MILLIGRAM(S): at 05:36

## 2022-03-22 RX ADMIN — BUDESONIDE AND FORMOTEROL FUMARATE DIHYDRATE 2 PUFF(S): 160; 4.5 AEROSOL RESPIRATORY (INHALATION) at 08:46

## 2022-03-22 RX ADMIN — BUDESONIDE AND FORMOTEROL FUMARATE DIHYDRATE 2 PUFF(S): 160; 4.5 AEROSOL RESPIRATORY (INHALATION) at 22:21

## 2022-03-22 RX ADMIN — HEPARIN SODIUM 14 UNIT(S)/HR: 5000 INJECTION INTRAVENOUS; SUBCUTANEOUS at 22:22

## 2022-03-22 RX ADMIN — CARVEDILOL PHOSPHATE 12.5 MILLIGRAM(S): 80 CAPSULE, EXTENDED RELEASE ORAL at 05:36

## 2022-03-22 RX ADMIN — Medication 1300 MILLIGRAM(S): at 22:20

## 2022-03-22 RX ADMIN — PANTOPRAZOLE SODIUM 40 MILLIGRAM(S): 20 TABLET, DELAYED RELEASE ORAL at 05:36

## 2022-03-22 RX ADMIN — Medication 25 GRAM(S): at 01:19

## 2022-03-22 RX ADMIN — Medication 3 MILLILITER(S): at 22:22

## 2022-03-22 RX ADMIN — Medication 1300 MILLIGRAM(S): at 05:36

## 2022-03-22 RX ADMIN — Medication 40 MILLIEQUIVALENT(S): at 05:37

## 2022-03-22 RX ADMIN — Medication 3 MILLILITER(S): at 17:41

## 2022-03-22 RX ADMIN — Medication 4 MILLIGRAM(S): at 22:20

## 2022-03-22 RX ADMIN — Medication 3 MILLILITER(S): at 00:05

## 2022-03-22 RX ADMIN — Medication 1300 MILLIGRAM(S): at 12:43

## 2022-03-22 RX ADMIN — Medication 500 MILLIGRAM(S): at 12:43

## 2022-03-22 NOTE — PROGRESS NOTE ADULT - ATTENDING COMMENTS
I have seen this patient with the fellow and agree with their assessment and plan. I was physically present for significant portions of the evaluation and management (E/M) service provided.  I agree with the above history, physical, and plan which I have reviewed and edited where appropriate.    Renea Pereira MD  Pager   Office     Contact me directly via [employer] Teams     (After 5 pm or on weekends please page the on-call fellow/attending, can check [email or web address] for schedule. Login is jamilah woo, schedule under [facility] medicine, psych, derm)

## 2022-03-22 NOTE — CHART NOTE - NSCHARTNOTEFT_GEN_A_CORE
Called for 6 beats of WCT.  Pt was asymptomatic, VS as documented.  STAT bmp, mg, phos drawn.  Based on results, will supplement with potassium and magnesium.      ~Leeann Haddad ANP-C

## 2022-03-22 NOTE — PROGRESS NOTE ADULT - SUBJECTIVE AND OBJECTIVE BOX
[facility] DIVISION OF KIDNEY DISEASES AND HYPERTENSION -- FOLLOW UP NOTE  --------------------------------------------------------------------------------  Chief Complaint: Eri on CKD    24 hour events/subjective: Pt. seen and examined today, reports feeling better. Scr stable at 1.66 and nonoliguric with UOP~ 750 cc in past 24 hours.       PAST HISTORY  --------------------------------------------------------------------------------  No significant changes to PMH, PSH, FHx, SHx, unless otherwise noted    ALLERGIES & MEDICATIONS  --------------------------------------------------------------------------------  Allergies    No Known Allergies    Intolerances      Standing Inpatient Medications  albuterol/ipratropium for Nebulization 3 milliLiter(s) Nebulizer every 6 hours  ascorbic acid 500 milliGRAM(s) Oral daily  atorvastatin 40 milliGRAM(s) Oral at bedtime  budesonide 160 MICROgram(s)/formoterol 4.5 MICROgram(s) Inhaler 2 Puff(s) Inhalation two times a day  carvedilol 12.5 milliGRAM(s) Oral every 12 hours  chlorhexidine 2% Cloths 1 Application(s) Topical <User Schedule>  chlorhexidine 4% Liquid 1 Application(s) Topical <User Schedule>  clopidogrel Tablet 75 milliGRAM(s) Oral daily  doxazosin 4 milliGRAM(s) Oral at bedtime  guaiFENesin  milliGRAM(s) Oral every 12 hours  heparin  Infusion 1400 Unit(s)/Hr IV Continuous <Continuous>  insulin lispro (ADMELOG) corrective regimen sliding scale   SubCutaneous three times a day before meals  Nephro-carlton 1 Tablet(s) Oral daily  pantoprazole    Tablet 40 milliGRAM(s) Oral before breakfast  potassium chloride    Tablet ER 40 milliEquivalent(s) Oral once  sodium bicarbonate 1300 milliGRAM(s) Oral three times a day    PRN Inpatient Medications  acetaminophen     Tablet .. 650 milliGRAM(s) Oral every 6 hours PRN      REVIEW OF SYSTEMS  --------------------------------------------------------------------------------  Gen: weakness+  Skin: No rashes  Head/Eyes/Ears: Normal hearing,   Respiratory: No dyspnea, cough  CV: No chest pain  GI: No abdominal pain, diarrhea  : as per HPI,   MSK: No  edema    All other systems were reviewed and are negative, except as noted.    VITALS/PHYSICAL EXAM  --------------------------------------------------------------------------------  T(C): 36.7 (03-22-22 @ 04:29), Max: 37 (03-21-22 @ 23:20)  HR: 89 (03-22-22 @ 04:29) (83 - 98)  BP: 140/73 (03-22-22 @ 04:29) (138/68 - 161/79)  RR: 18 (03-22-22 @ 04:29) (16 - 18)  SpO2: 96% (03-22-22 @ 04:29) (94% - 98%)  Wt(kg): --    Weight (kg): 71.3 (03-21-22 @ 10:52)      03-21-22 @ 07:01  -  03-22-22 @ 07:00  --------------------------------------------------------  IN: 480 mL / OUT: 750 mL / NET: -270 mL        Physical Exam:  	Gen: NAD  	HEENT: MMM  	Pulm: CTA B/L  	CV: S1S2  	Abd: Soft, +BS   	Ext: R AKA, NO edema LE  	Neuro: Awake, alert  	Skin: Warm and dry          : [place] cath+ with clear urine  	Skin: Warm and dry      LABS/STUDIES  --------------------------------------------------------------------------------              9.8    11.31 >-----------<  193      [03-22-22 @ 07:21]              30.1     137  |  104  |  17  ----------------------------<  105      [03-22-22 @ 07:21]  3.4   |  17  |  1.66        Ca     8.7     [03-22-22 @ 07:21]      Mg     2.2     [03-22-22 @ 07:21]      Phos  3.3     [03-22-22 @ 00:13]    Creatinine Trend:  SCr 1.66 [03-22 @ 07:21]  SCr 1.69 [03-22 @ 00:13]  SCr 1.65 [03-21 @ 17:18]  SCr 1.63 [03-20 @ 04:57]  SCr 1.71 [03-19 @ 06:11]    Urinalysis - [03-15-22 @ 02:23]      Color Light Yellow / Appearance Slightly Turbid / SG 1.012 / pH 5.5      Gluc Negative / Ketone Negative  / Bili Negative / Urobili Negative       Blood Large / Protein 30 mg/dL / Leuk Est Negative / Nitrite Negative      RBC 51 / WBC 6 / Hyaline 7 / Gran 2 / Sq Epi  / Non Sq Epi 2 / Bacteria Negative      HCV 0.16, Nonreact      [03-12-22 @ 00:43]

## 2022-03-22 NOTE — PROGRESS NOTE ADULT - ASSESSMENT
76M w/ pmh of HTN, HLD, PVD s/p R AKA who was brought by EMS to [facility] after a mechanical fall from his wheelchair found to have CHF exacerbation, PNA as well as acute LLE arterial occlusions w/ unsuccessful angiogram. Stress test at OSH was positive, transferred for angiogram for cardiac clearance for possible LLE BKA

## 2022-03-22 NOTE — PROGRESS NOTE ADULT - PROBLEM SELECTOR PLAN 1
[facility] completed, LM 10%, LAD 60%, Cx and RCA  with collaterals   Per cardiology high risk patient undergoing intermediate risk surgery/procedure OPTIMIZED to the lowest risk predicted by the RCRI based on co-morbidities and nature of operation/procedure.   Continue medical management with Coreg, Lipitor, Plavix

## 2022-03-22 NOTE — PROGRESS NOTE ADULT - SUBJECTIVE AND OBJECTIVE BOX
Ra Seo MD    Patient is a 76y old  Male who presents with a chief complaint of Cardiac clearnace for ischemic limb (22 Mar 2022 09:45)        SUBJECTIVE / OVERNIGHT EVENTS: No new complaints  TELEMETRY: SR 70-80's, 6 bts WCT      MEDICATIONS  (STANDING):  albuterol/ipratropium for Nebulization 3 milliLiter(s) Nebulizer every 6 hours  ascorbic acid 500 milliGRAM(s) Oral daily  atorvastatin 40 milliGRAM(s) Oral at bedtime  budesonide 160 MICROgram(s)/formoterol 4.5 MICROgram(s) Inhaler 2 Puff(s) Inhalation two times a day  carvedilol 12.5 milliGRAM(s) Oral every 12 hours  chlorhexidine 2% Cloths 1 Application(s) Topical <User Schedule>  chlorhexidine 4% Liquid 1 Application(s) Topical <User Schedule>  clopidogrel Tablet 75 milliGRAM(s) Oral daily  doxazosin 4 milliGRAM(s) Oral at bedtime  guaiFENesin  milliGRAM(s) Oral every 12 hours  heparin  Infusion 1400 Unit(s)/Hr (14 mL/Hr) IV Continuous <Continuous>  insulin lispro (ADMELOG) corrective regimen sliding scale   SubCutaneous three times a day before meals  Nephro-carlton 1 Tablet(s) Oral daily  pantoprazole    Tablet 40 milliGRAM(s) Oral before breakfast  potassium chloride    Tablet ER 40 milliEquivalent(s) Oral once  sodium bicarbonate 1300 milliGRAM(s) Oral three times a day    MEDICATIONS  (PRN):  acetaminophen     Tablet .. 650 milliGRAM(s) Oral every 6 hours PRN Moderate Pain (4 - 6)      Vital Signs Last 24 Hrs  T(C): 36.7 (22 Mar 2022 11:42), Max: 37 (21 Mar 2022 23:20)  T(F): 98 (22 Mar 2022 11:42), Max: 98.6 (21 Mar 2022 23:20)  HR: 79 (22 Mar 2022 11:42) (79 - 91)  BP: 150/78 (22 Mar 2022 11:42) (138/68 - 161/79)  BP(mean): --  RR: 18 (22 Mar 2022 11:42) (16 - 18)  SpO2: 98% (22 Mar 2022 11:42) (94% - 98%)  CAPILLARY BLOOD GLUCOSE      POCT Blood Glucose.: 121 mg/dL (22 Mar 2022 08:00)  POCT Blood Glucose.: 127 mg/dL (21 Mar 2022 21:11)  POCT Blood Glucose.: 116 mg/dL (21 Mar 2022 17:05)    I&O's Summary    21 Mar 2022 07:01  -  22 Mar 2022 07:00  --------------------------------------------------------  IN: 480 mL / OUT: 750 mL / NET: -270 mL    22 Mar 2022 07:01  -  22 Mar 2022 12:03  --------------------------------------------------------  IN: 120 mL / OUT: 1 mL / NET: 119 mL          PHYSICAL EXAM  GENERAL: NAD, well-developed  HEAD:  Atraumatic, normocephalic  EYES: EOMI, PERRLA, conjunctiva and sclera clear  CHEST/LUNG: Clear to auscultation anteriorly; no wheezes  HEART: +S1+S2, regular rate and rhythm  ABDOMEN: Soft, nontender, mildly distended; bowel sounds present  EXTREMITIES:  R AKA; LLE cold to touch below knee, + edema  PSYCH: AAOx3    LABS:                        9.8    11.31 )-----------( 193      ( 22 Mar 2022 07:21 )             30.1     03-22    137  |  104  |  17  ----------------------------<  105<H>  3.4<L>   |  17<L>  |  1.66<H>    Ca    8.7      22 Mar 2022 07:21  Phos  3.3     03-22  Mg     2.2     03-22      PTT - ( 22 Mar 2022 07:18 )  PTT:65.1 sec            RADIOLOGY & ADDITIONAL TESTS:    Imaging Personally Reviewed:  Consultant(s) Notes Reviewed:    Care Discussed with Consultants/Other Providers:

## 2022-03-22 NOTE — PROGRESS NOTE ADULT - PROBLEM SELECTOR PLAN 2
In setting of renal failure. SCo2 improved to 17 Continue PO sodium bicarbonate 1300 mg TID. Monitor SCo2 daily.

## 2022-03-22 NOTE — PROGRESS NOTE ADULT - PROBLEM SELECTOR PLAN 1
BAUDILIO vs BAUDILIO on CKD. Pt with no prior baseline Scr. Pt likely with underlying CKD.   Baseline unknown. No prior labs on [facility].   SCr 3.06 on 3/11/22 and improved to 1.75. Pt. with no prior CKD work up. Renal function plateauing, Scr stable at 1.66 and pt is nonoliguric. Pt. likely with new baseline. Pt with [place] cath, bladder scan prn to monitor PVR and urinary retention. Replete K.   Monitor labs and urine output. Avoid NSAIDs, ACEI/ARBS, RCA and nephrotoxins. Dose medications as per eGFR.

## 2022-03-23 LAB
ANION GAP SERPL CALC-SCNC: 15 MMOL/L — SIGNIFICANT CHANGE UP (ref 5–17)
APTT BLD: 63.3 SEC — HIGH (ref 27.5–35.5)
BUN SERPL-MCNC: 18 MG/DL — SIGNIFICANT CHANGE UP (ref 7–23)
CALCIUM SERPL-MCNC: 8.7 MG/DL — SIGNIFICANT CHANGE UP (ref 8.4–10.5)
CHLORIDE SERPL-SCNC: 106 MMOL/L — SIGNIFICANT CHANGE UP (ref 96–108)
CO2 SERPL-SCNC: 17 MMOL/L — LOW (ref 22–31)
CREAT SERPL-MCNC: 1.64 MG/DL — HIGH (ref 0.5–1.3)
EGFR: 44 ML/MIN/1.73M2 — LOW
GLUCOSE BLDC GLUCOMTR-MCNC: 112 MG/DL — HIGH (ref 70–99)
GLUCOSE BLDC GLUCOMTR-MCNC: 113 MG/DL — HIGH (ref 70–99)
GLUCOSE BLDC GLUCOMTR-MCNC: 125 MG/DL — HIGH (ref 70–99)
GLUCOSE BLDC GLUCOMTR-MCNC: 140 MG/DL — HIGH (ref 70–99)
GLUCOSE SERPL-MCNC: 110 MG/DL — HIGH (ref 70–99)
MAGNESIUM SERPL-MCNC: 1.9 MG/DL — SIGNIFICANT CHANGE UP (ref 1.6–2.6)
POTASSIUM SERPL-MCNC: 3.4 MMOL/L — LOW (ref 3.5–5.3)
POTASSIUM SERPL-SCNC: 3.4 MMOL/L — LOW (ref 3.5–5.3)
SODIUM SERPL-SCNC: 138 MMOL/L — SIGNIFICANT CHANGE UP (ref 135–145)

## 2022-03-23 PROCEDURE — 99232 SBSQ HOSP IP/OBS MODERATE 35: CPT

## 2022-03-23 RX ORDER — MAGNESIUM OXIDE 400 MG ORAL TABLET 241.3 MG
400 TABLET ORAL ONCE
Refills: 0 | Status: COMPLETED | OUTPATIENT
Start: 2022-03-23 | End: 2022-03-23

## 2022-03-23 RX ORDER — POTASSIUM CHLORIDE 20 MEQ
20 PACKET (EA) ORAL ONCE
Refills: 0 | Status: COMPLETED | OUTPATIENT
Start: 2022-03-23 | End: 2022-03-23

## 2022-03-23 RX ADMIN — CHLORHEXIDINE GLUCONATE 1 APPLICATION(S): 213 SOLUTION TOPICAL at 06:15

## 2022-03-23 RX ADMIN — Medication 1300 MILLIGRAM(S): at 22:01

## 2022-03-23 RX ADMIN — HEPARIN SODIUM 14 UNIT(S)/HR: 5000 INJECTION INTRAVENOUS; SUBCUTANEOUS at 07:20

## 2022-03-23 RX ADMIN — BUDESONIDE AND FORMOTEROL FUMARATE DIHYDRATE 2 PUFF(S): 160; 4.5 AEROSOL RESPIRATORY (INHALATION) at 11:29

## 2022-03-23 RX ADMIN — Medication 4 MILLIGRAM(S): at 22:01

## 2022-03-23 RX ADMIN — Medication 1300 MILLIGRAM(S): at 06:15

## 2022-03-23 RX ADMIN — Medication 1300 MILLIGRAM(S): at 11:20

## 2022-03-23 RX ADMIN — CARVEDILOL PHOSPHATE 12.5 MILLIGRAM(S): 80 CAPSULE, EXTENDED RELEASE ORAL at 17:19

## 2022-03-23 RX ADMIN — Medication 500 MILLIGRAM(S): at 11:21

## 2022-03-23 RX ADMIN — CHLORHEXIDINE GLUCONATE 1 APPLICATION(S): 213 SOLUTION TOPICAL at 06:14

## 2022-03-23 RX ADMIN — Medication 3 MILLILITER(S): at 11:20

## 2022-03-23 RX ADMIN — CLOPIDOGREL BISULFATE 75 MILLIGRAM(S): 75 TABLET, FILM COATED ORAL at 11:21

## 2022-03-23 RX ADMIN — MAGNESIUM OXIDE 400 MG ORAL TABLET 400 MILLIGRAM(S): 241.3 TABLET ORAL at 08:27

## 2022-03-23 RX ADMIN — ATORVASTATIN CALCIUM 40 MILLIGRAM(S): 80 TABLET, FILM COATED ORAL at 22:01

## 2022-03-23 RX ADMIN — Medication 3 MILLILITER(S): at 06:14

## 2022-03-23 RX ADMIN — PANTOPRAZOLE SODIUM 40 MILLIGRAM(S): 20 TABLET, DELAYED RELEASE ORAL at 06:15

## 2022-03-23 RX ADMIN — Medication 3 MILLILITER(S): at 17:19

## 2022-03-23 RX ADMIN — CARVEDILOL PHOSPHATE 12.5 MILLIGRAM(S): 80 CAPSULE, EXTENDED RELEASE ORAL at 06:15

## 2022-03-23 RX ADMIN — BUDESONIDE AND FORMOTEROL FUMARATE DIHYDRATE 2 PUFF(S): 160; 4.5 AEROSOL RESPIRATORY (INHALATION) at 22:02

## 2022-03-23 RX ADMIN — Medication 1 TABLET(S): at 11:21

## 2022-03-23 RX ADMIN — Medication 20 MILLIEQUIVALENT(S): at 08:27

## 2022-03-23 RX ADMIN — Medication 600 MILLIGRAM(S): at 06:15

## 2022-03-23 RX ADMIN — Medication 600 MILLIGRAM(S): at 17:20

## 2022-03-23 NOTE — CONSULT NOTE ADULT - REASON FOR ADMISSION
Cardiac clearnace for ischemic limb
Cardiac clearance for ischemic limb
Cardiac clearnace for ischemic limb

## 2022-03-23 NOTE — PROGRESS NOTE ADULT - ASSESSMENT
76M w/ pmh of HTN, HLD, PVD s/p R AKA who was brought by EMS to [facility] after a mechanical fall from his wheelchair found to have CHF exacerbation, PNA as well as acute LLE arterial occlusions w/ unsuccessful angiogram. Stress test at OSH was positive, transferred for angiogram for cardiac clearance for possible LLE BKA

## 2022-03-23 NOTE — PROGRESS NOTE ADULT - PROBLEM SELECTOR PLAN 1
[facility] completed, LM 10%, LAD 60%, Cx and RCA  with collaterals   Per cardiology high risk patient undergoing intermediate risk surgery/procedure OPTIMIZED to the lowest risk predicted by the RCRI based on co-morbidities and nature of operation/procedure.   Continue medical management with Coreg, Lipitor, Plavix

## 2022-03-23 NOTE — PROGRESS NOTE ADULT - SUBJECTIVE AND OBJECTIVE BOX
[facility]-- WOUND TEAM -- FOLLOW UP NOTE  --------------------------------------------------------------------------------    24 hour events/subjective:    pt tolerating po  incontinent  afebrile      Diet:  Diet, Consistent Carbohydrate w/Evening Snack:   DASH/TLC Sodium & Cholesterol Restricted (DASH)  Pureed (PUREED)  Mahin(7 Gm Arginine/7 Gm Glut/1.2 Gm HMB     Qty per Day:  2  Supplement Feeding Modality:  Oral (03-21-22 @ 11:20)      ROS: General/ SKIN see HPI  all other systems negative    ALLERGIES & MEDICATIONS  --------------------------------------------------------------------------------      No Known Allergies    STANDING INPATIENT MEDICATIONS  albuterol/ipratropium for Nebulization 3 milliLiter(s) Nebulizer every 6 hours  ascorbic acid 500 milliGRAM(s) Oral daily  atorvastatin 40 milliGRAM(s) Oral at bedtime  budesonide 160 MICROgram(s)/formoterol 4.5 MICROgram(s) Inhaler 2 Puff(s) Inhalation two times a day  carvedilol 12.5 milliGRAM(s) Oral every 12 hours  chlorhexidine 2% Cloths 1 Application(s) Topical <User Schedule>  chlorhexidine 4% Liquid 1 Application(s) Topical <User Schedule>  clopidogrel Tablet 75 milliGRAM(s) Oral daily  doxazosin 4 milliGRAM(s) Oral at bedtime  guaiFENesin  milliGRAM(s) Oral every 12 hours  heparin  Infusion 1400 Unit(s)/Hr IV Continuous <Continuous>  insulin lispro (ADMELOG) corrective regimen sliding scale   SubCutaneous three times a day before meals  Nephro-carlton 1 Tablet(s) Oral daily  pantoprazole    Tablet 40 milliGRAM(s) Oral before breakfast  sodium bicarbonate 1300 milliGRAM(s) Oral three times a day      PRN INPATIENT MEDICATION  acetaminophen     Tablet .. 650 milliGRAM(s) Oral every 6 hours PRN        VITALS/PHYSICAL EXAM  --------------------------------------------------------------------------------  T(C): 36.8 (23 Mar 2022 11:59), Max: 36.8 (23 Mar 2022 11:59)  T(F): 98.2 (23 Mar 2022 11:59), Max: 98.2 (23 Mar 2022 11:59)  HR: 68 (23 Mar 2022 11:59) (68 - 79)  BP: 121/67 (23 Mar 2022 11:59) (108/59 - 150/74)  BP(mean): --  RR: 18 (23 Mar 2022 11:59) (18 - 18)  SpO2: 99% (23 Mar 2022 11:59) (97% - 99%)      General: Alert, weak, frail  HEENT: nc/at eomi mucous moist neck supple  psych: calm/ appropriate   Neurology: weakened strength & sensation grossly intact  Musculoskeletal: R AKA, BUE FROM   Vascular: LLE cold w/ dry gangrene  Skin: soft good turgor    Sacral/bilateral buttocks w/ incontinence dermatitis   superficially denuded skin  3cm X 1cm x  0.1cm in gluteal cleft    posterior scrotum - with superficially denuded skin 2.5cm x  2.5cm x  0.1cm     no necrotic tissue, no blistering or drainage  No odor, erythema, increased warmth, tenderness, induration, fluctuance, nor crepitus       LABS/ CULTURES/ RADIOLOGY:              9.8    11.31 >-----------<  193      [03-22-22 @ 07:21]              30.1     138  |  106  |  18  ----------------------------<  110      [03-23-22 @ 06:42]  3.4   |  17  |  1.64        Ca     8.7     [03-23-22 @ 06:42]      Mg     1.9     [03-23-22 @ 06:42]      Phos  3.3     [03-22-22 @ 00:13]      CAPILLARY BLOOD GLUCOSE  POCT Blood Glucose.: 112 mg/dL (23 Mar 2022 21:40)  POCT Blood Glucose.: 113 mg/dL (23 Mar 2022 16:58)  POCT Blood Glucose.: 140 mg/dL (23 Mar 2022 11:34)  POCT Blood Glucose.: 125 mg/dL (23 Mar 2022 07:45)      A1C with Estimated Average Glucose Result: 5.7 % (03-11-22 @ 23:30)

## 2022-03-23 NOTE — PROGRESS NOTE ADULT - PROBLEM SELECTOR PLAN 2
Improved- likely source LLE  Blood cultures (3/15) No growth  Start Zosyn if febrile for possible LLE source

## 2022-03-23 NOTE — CONSULT NOTE ADULT - CONSULT REASON
sacral/ buttocks wound
BAUDILIO
leukocytosis
sacral/bilateral buttocks skiin damage
Eval for amputation

## 2022-03-23 NOTE — PROGRESS NOTE ADULT - SUBJECTIVE AND OBJECTIVE BOX
Ra Seo MD    Patient is a 76y old  Male who presents with a chief complaint of Cardiac clearnace for ischemic limb (23 Mar 2022 15:56)        SUBJECTIVE / OVERNIGHT EVENTS: No new events  TELEMETRY: SR 70-90's, PVCs      MEDICATIONS  (STANDING):  albuterol/ipratropium for Nebulization 3 milliLiter(s) Nebulizer every 6 hours  ascorbic acid 500 milliGRAM(s) Oral daily  atorvastatin 40 milliGRAM(s) Oral at bedtime  budesonide 160 MICROgram(s)/formoterol 4.5 MICROgram(s) Inhaler 2 Puff(s) Inhalation two times a day  carvedilol 12.5 milliGRAM(s) Oral every 12 hours  chlorhexidine 2% Cloths 1 Application(s) Topical <User Schedule>  chlorhexidine 4% Liquid 1 Application(s) Topical <User Schedule>  clopidogrel Tablet 75 milliGRAM(s) Oral daily  doxazosin 4 milliGRAM(s) Oral at bedtime  guaiFENesin  milliGRAM(s) Oral every 12 hours  heparin  Infusion 1400 Unit(s)/Hr (14 mL/Hr) IV Continuous <Continuous>  insulin lispro (ADMELOG) corrective regimen sliding scale   SubCutaneous three times a day before meals  Nephro-carlton 1 Tablet(s) Oral daily  pantoprazole    Tablet 40 milliGRAM(s) Oral before breakfast  sodium bicarbonate 1300 milliGRAM(s) Oral three times a day    MEDICATIONS  (PRN):  acetaminophen     Tablet .. 650 milliGRAM(s) Oral every 6 hours PRN Moderate Pain (4 - 6)      Vital Signs Last 24 Hrs  T(C): 36.8 (23 Mar 2022 11:59), Max: 36.8 (23 Mar 2022 11:59)  T(F): 98.2 (23 Mar 2022 11:59), Max: 98.2 (23 Mar 2022 11:59)  HR: 68 (23 Mar 2022 11:59) (68 - 79)  BP: 121/67 (23 Mar 2022 11:59) (108/59 - 150/74)  BP(mean): --  RR: 18 (23 Mar 2022 11:59) (18 - 18)  SpO2: 99% (23 Mar 2022 11:59) (97% - 99%)  CAPILLARY BLOOD GLUCOSE      POCT Blood Glucose.: 140 mg/dL (23 Mar 2022 11:34)  POCT Blood Glucose.: 125 mg/dL (23 Mar 2022 07:45)  POCT Blood Glucose.: 117 mg/dL (22 Mar 2022 20:21)  POCT Blood Glucose.: 99 mg/dL (22 Mar 2022 17:04)    I&O's Summary    22 Mar 2022 07:01  -  23 Mar 2022 07:00  --------------------------------------------------------  IN: 415 mL / OUT: 401 mL / NET: 14 mL    23 Mar 2022 07:01  -  23 Mar 2022 16:06  --------------------------------------------------------  IN: 640 mL / OUT: 0 mL / NET: 640 mL          PHYSICAL EXAM  GENERAL: NAD, well-developed  HEAD:  Atraumatic, normocephalic  EYES: EOMI, PERRLA, conjunctiva and sclera clear  CHEST/LUNG: Clear to auscultation anteriorly; no wheezes  HEART: +S1+S2, regular rate and rhythm  ABDOMEN: Soft, nontender, mildly distended; bowel sounds present  EXTREMITIES:  R AKA; LLE cold to touch below knee, + edema  PSYCH: AAOx3      LABS:                        9.8    11.31 )-----------( 193      ( 22 Mar 2022 07:21 )             30.1     03-23    138  |  106  |  18  ----------------------------<  110<H>  3.4<L>   |  17<L>  |  1.64<H>    Ca    8.7      23 Mar 2022 06:42  Phos  3.3     03-22  Mg     1.9     03-23      PTT - ( 23 Mar 2022 06:42 )  PTT:63.3 sec            RADIOLOGY & ADDITIONAL TESTS:    Imaging Personally Reviewed:  Consultant(s) Notes Reviewed:    Care Discussed with Consultants/Other Providers:

## 2022-03-23 NOTE — CONSULT NOTE ADULT - SUBJECTIVE AND OBJECTIVE BOX
Wound SURGERY CONSULT NOTE    HPI:  75 yo M w/ pmh of HTN, HLD, PVD, R AKA who was brought by EMS to [facility]  after a mechanical fall from his wheelchair at home. NO LOC or head injury and he denies dizziness syncope. He was admitted to [facility] for sepsis due to PNA w/ troponemia 2/2 to demand on 3/6/2022. He was also found to be in CHF as well as a LE arterial embolism. Pt received Azithromycin and Ceftriaxone for a B/L PNA seen on CT Chest. Hypoxic resp failure improved by day 2 of ICU stay. On 3/8 pt begin to complain of severe LLE pain and found to have LLE CFA and prox SFA occlusions.  Pt was started on a heparin gtt and an arterial angiogram was attempted by Vascular surgery however it was unsuccessful. During the aortogram he became hypertensive with RAFIQ and decision was made to abort the case. BKA was recommended. Pt was noted to have elevated CKs and managed for rhabdo. Pre-op stress testing for BKA revealed multiple ischemic regions. Pt was transferred to [facility] for cardiac cath.     ECHO revealed EF 35-40% , grade 2 DD.  (11 Mar 2022 16:57)        N/V/D,  BM/ Flatus,   NGT,     palp/ sob/dyspnea/ cp,       F/C/S  Wound consult requested by team to assist w/ management of      wound/ pressure injury.   Pt (unable to)  c/o pain, drainage, odor, color change,  worsening swelling. Offloading and pericare initiated Increasingly sedentary 2/2 to illness. Pt is Incontinent of urine & stool. (+)urena/ ostomy.   H/o falls, trauma.  (Pt seen by Wound RN and )TRIAD/ Aquacell/ Allevyn/ medihoney/ dakins/ Adaptic/ DSD used at home/ while awaiting consult.  Appetite good/ decreased.  weight loss.  S&S / RD consult appreciated All questions asked and answered to pt's and family's satisfaction.    Current Diet: Diet, Consistent Carbohydrate w/Evening Snack:   DASH/TLC Sodium & Cholesterol Restricted (DASH)  Pureed (PUREED)  Mahin(7 Gm Arginine/7 Gm Glut/1.2 Gm HMB     Qty per Day:  2  Supplement Feeding Modality:  Oral (03-21-22 @ 11:20)      PAST MEDICAL & SURGICAL HISTORY:  HTN (hypertension)    Hyperlipidemia    PVD (peripheral vascular disease)    S/P BKA (below knee amputation), right        REVIEW OF SYSTEMS: Pt unable to offer  General/ Breast/ Skin/ Neuro/ MSK: see HPI  All other systems negative    MEDICATIONS  (STANDING):  albuterol/ipratropium for Nebulization 3 milliLiter(s) Nebulizer every 6 hours  ascorbic acid 500 milliGRAM(s) Oral daily  atorvastatin 40 milliGRAM(s) Oral at bedtime  budesonide 160 MICROgram(s)/formoterol 4.5 MICROgram(s) Inhaler 2 Puff(s) Inhalation two times a day  carvedilol 12.5 milliGRAM(s) Oral every 12 hours  chlorhexidine 2% Cloths 1 Application(s) Topical <User Schedule>  chlorhexidine 4% Liquid 1 Application(s) Topical <User Schedule>  clopidogrel Tablet 75 milliGRAM(s) Oral daily  doxazosin 4 milliGRAM(s) Oral at bedtime  guaiFENesin  milliGRAM(s) Oral every 12 hours  heparin  Infusion 1400 Unit(s)/Hr (14 mL/Hr) IV Continuous <Continuous>  insulin lispro (ADMELOG) corrective regimen sliding scale   SubCutaneous three times a day before meals  Nephro-carlton 1 Tablet(s) Oral daily  pantoprazole    Tablet 40 milliGRAM(s) Oral before breakfast  sodium bicarbonate 1300 milliGRAM(s) Oral three times a day    MEDICATIONS  (PRN):  acetaminophen     Tablet .. 650 milliGRAM(s) Oral every 6 hours PRN Moderate Pain (4 - 6)      Allergies    No Known Allergies    Intolerances        SOCIAL HISTORY:  / /single/ ; (+)HHA/ lives in SNF; Former smoker, No/ Denies smoking, ETOH, drugs    FAMILY HISTORY:   no h/o PVD or wound healing or skin problems    PHYSICAL EXAM:  Vital Signs Last 24 Hrs  T(C): 36.8 (23 Mar 2022 11:59), Max: 36.8 (23 Mar 2022 11:59)  T(F): 98.2 (23 Mar 2022 11:59), Max: 98.2 (23 Mar 2022 11:59)  HR: 68 (23 Mar 2022 11:59) (68 - 79)  BP: 121/67 (23 Mar 2022 11:59) (108/59 - 150/74)  BP(mean): --  RR: 18 (23 Mar 2022 11:59) (18 - 18)  SpO2: 99% (23 Mar 2022 11:59) (97% - 99%)    NAD / Guarded but stable,  A&Ox3/ Alert/ Confused  cachectic/ MO/ Obese/ frail  WD/ WN/ WG/ Disheveled  Total Care Sport/ Versa Care P500 / Envella Progressa bed     HEENT:  NC/AT, PERRL, EOMI, mucosa moist, throat clear, trachea midline, neck supple  Cardiovascular: RRR (+)m  Respiratory: CTA  Gastrointestinal soft NT/ND (+)BS  (+)PEG (+)ostomy (+)NGT  Neurology:  weakened strength & sensation grossly intact/ paraesthesia  nonverbal, no follow commands/ paraplegic  Psych: calm/ appropriate/ flat affect/ easily aggitated/ restless  Musculoskeletal:  limited stiff / FROM, no deformities/ contractures  Vascular: BLE equally warm/ cool,  no cyanosis, clubbing, edema           >LE //BLE edema equal           BLE DP/PT pulses palpable           no acute ischemia noted          BLE hemosiderin staining  Skin:  moist w/ good turgor  pale, frail,  ecchymosis w/o hematoma  serosanguinous drainage  No odor, erythema, increased warmth, tenderness, induration, fluctuance    LABS/ CULTURES/ RADIOLOGY:                        9.8    11.31 )-----------( 193      ( 22 Mar 2022 07:21 )             30.1       138  |  106  |  18  ----------------------------<  110      [03-23-22 @ 06:42]  3.4   |  17  |  1.64        Ca     8.7     [03-23-22 @ 06:42]      Mg     1.9     [03-23-22 @ 06:42]      Phos  3.3     [03-22-22 @ 00:13]        PTT: 63.3       [03-23-22 @ 06:42]                        A/P:    Wound Consult requested to assist w/ management of      Consider DASHAWN/PVR, XRay, Duplex, MRI, CT  BLE elevation & Compression  Abx per Medicine/ ID  Moisturize intact skin w/ SWEEN cream BID  Nutrition Consult for optimization in pt w/ Severe Protein Calorie Malnutrition,        Inadequate PO intake, & Increased nutritional needs            encourage high quality protein, MVI & Vit C to promote wound healing  Hyperglycemia - improving w/ ADA diet and Lantus/ NPH,         FS w/ ISS q6h/ qmeal & qhs, consider Endo Consult, consider HgA1c  Anemia- improving transfusions, Fe studies, protonix  Continue turning and positioning w/ offloading assistive devices as per protocol  Melisa/ TRIAD BID and prn soiling //       Continue w/ Pericare w/ urena/ primafit care, attends underpads as per protocol  Waffle Cushion to chair when oob to chair  Continue w/ low air loss pressure redistribution bed surface   Care as per medicine, will follow w/ you  Upon discharge f/u as outpatient at Wound Center [address] [facility] [phone]  Seen w/ attng & RN and D/w team & RN  Thank you for this consult  Madhuri Leavitt PA-C CWS [id number]

## 2022-03-23 NOTE — PROGRESS NOTE ADULT - ASSESSMENT
76M w/ pmh of HTN, HLD, PVD s/p R AKBIN who was brought by EMS to [facility] after a mechanical fall from his wheelchair found to have CHF exacerbation, PNA as well as acute LLE arterial occlusions w/ unsuccessful angiogram. Stress test at OSH was positive, transferred for angiogram for cardiac clearance for possible LLE BKA      Sacral/bilateral Buttocks stage 2 pressure injury present on admission  Incontinence of bowel and bladder  Incontinence Dermatitis    Recommend:  1.) sacral/buttock injury - Triad ointment twice daily and PRN for incontinent episodes  2.) Incontinence Management -continue w/ incontinence cleanser, pads, pericare BID, avoid diapers  3.) Maintain on an alternating air with low air loss surface  4.) Turn and reposition Q 2 hours w/ offloading devices  5.) Nutrition optimization to assist w/ wound healing  6.) Offload heels/feet with complete cair air fluidized boots; ensure that the soles of the feet are not resting on the foot board of the bed.  Care as per medicine, will follow w/you  Upon discharge f/u as outpatient at Wound Center [address] [place] [address] [phone]  seen w/ douglas brunner/w RN and team  Madhuri Leavitt PA-C, CWS [id number]  we spent 25minutes face to face w/ this pt of which more than 50% of the time was spent counseling & coordinating care of this pt.  76M w/ pmh of HTN, HLD, PVD s/p R AKBIN who was brought by EMS to [facility] after a mechanical fall from his wheelchair found to have CHF exacerbation, PNA as well as acute LLE arterial occlusions w/ unsuccessful angiogram. Stress test at OSH was positive, transferred for angiogram for cardiac clearance for possible LLE BKA      Sacral/bilateral Buttocks stage 2 pressure injury present on admission  Incontinence of bowel and bladder  Incontinence Dermatitis    Recommend:  1.) sacral/buttock injury - Triad ointment twice daily and PRN for incontinent episodes  2.) Incontinence Management -continue w/ incontinence cleanser, pads, pericare BID, avoid diapers  3.) Maintain on an alternating air with low air loss surface  4.) Turn and reposition Q 2 hours w/ offloading devices  5.) Nutrition optimization to assist w/ wound healing  6.) LLE as per Vascular.  Offload heels/feet with complete cair air fluidized boots; ensure that the soles of the feet are not resting on the foot board of the bed.  Care as per medicine, will follow w/you  Upon discharge f/u as outpatient at Wound Center [address] [place], [place] [phone]  seen w/ myesha d/w RN and team  Madhuri Leavitt PA-C, CWS [id number]  we spent 25minutes face to face w/ this pt of which more than 50% of the time was spent counseling & coordinating care of this pt.

## 2022-03-24 LAB
ANION GAP SERPL CALC-SCNC: 17 MMOL/L — SIGNIFICANT CHANGE UP (ref 5–17)
APTT BLD: 67.6 SEC — HIGH (ref 27.5–35.5)
BUN SERPL-MCNC: 19 MG/DL — SIGNIFICANT CHANGE UP (ref 7–23)
CALCIUM SERPL-MCNC: 8.7 MG/DL — SIGNIFICANT CHANGE UP (ref 8.4–10.5)
CHLORIDE SERPL-SCNC: 105 MMOL/L — SIGNIFICANT CHANGE UP (ref 96–108)
CO2 SERPL-SCNC: 16 MMOL/L — LOW (ref 22–31)
CREAT SERPL-MCNC: 1.69 MG/DL — HIGH (ref 0.5–1.3)
EGFR: 42 ML/MIN/1.73M2 — LOW
GLUCOSE BLDC GLUCOMTR-MCNC: 106 MG/DL — HIGH (ref 70–99)
GLUCOSE BLDC GLUCOMTR-MCNC: 110 MG/DL — HIGH (ref 70–99)
GLUCOSE BLDC GLUCOMTR-MCNC: 114 MG/DL — HIGH (ref 70–99)
GLUCOSE BLDC GLUCOMTR-MCNC: 121 MG/DL — HIGH (ref 70–99)
GLUCOSE SERPL-MCNC: 95 MG/DL — SIGNIFICANT CHANGE UP (ref 70–99)
HCT VFR BLD CALC: 30.4 % — LOW (ref 39–50)
HGB BLD-MCNC: 9.4 G/DL — LOW (ref 13–17)
MAGNESIUM SERPL-MCNC: 1.7 MG/DL — SIGNIFICANT CHANGE UP (ref 1.6–2.6)
MCHC RBC-ENTMCNC: 25.9 PG — LOW (ref 27–34)
MCHC RBC-ENTMCNC: 30.9 GM/DL — LOW (ref 32–36)
MCV RBC AUTO: 83.7 FL — SIGNIFICANT CHANGE UP (ref 80–100)
NRBC # BLD: 0 /100 WBCS — SIGNIFICANT CHANGE UP (ref 0–0)
PLATELET # BLD AUTO: 165 K/UL — SIGNIFICANT CHANGE UP (ref 150–400)
POTASSIUM SERPL-MCNC: 3.2 MMOL/L — LOW (ref 3.5–5.3)
POTASSIUM SERPL-SCNC: 3.2 MMOL/L — LOW (ref 3.5–5.3)
RBC # BLD: 3.63 M/UL — LOW (ref 4.2–5.8)
RBC # FLD: 17.3 % — HIGH (ref 10.3–14.5)
SARS-COV-2 RNA SPEC QL NAA+PROBE: SIGNIFICANT CHANGE UP
SODIUM SERPL-SCNC: 138 MMOL/L — SIGNIFICANT CHANGE UP (ref 135–145)
WBC # BLD: 9.75 K/UL — SIGNIFICANT CHANGE UP (ref 3.8–10.5)
WBC # FLD AUTO: 9.75 K/UL — SIGNIFICANT CHANGE UP (ref 3.8–10.5)

## 2022-03-24 PROCEDURE — 99232 SBSQ HOSP IP/OBS MODERATE 35: CPT

## 2022-03-24 RX ORDER — POTASSIUM CHLORIDE 20 MEQ
40 PACKET (EA) ORAL EVERY 4 HOURS
Refills: 0 | Status: COMPLETED | OUTPATIENT
Start: 2022-03-24 | End: 2022-03-24

## 2022-03-24 RX ORDER — MAGNESIUM OXIDE 400 MG ORAL TABLET 241.3 MG
400 TABLET ORAL ONCE
Refills: 0 | Status: COMPLETED | OUTPATIENT
Start: 2022-03-24 | End: 2022-03-24

## 2022-03-24 RX ORDER — POTASSIUM CHLORIDE 20 MEQ
20 PACKET (EA) ORAL
Refills: 0 | Status: COMPLETED | OUTPATIENT
Start: 2022-03-24 | End: 2022-03-24

## 2022-03-24 RX ADMIN — ATORVASTATIN CALCIUM 40 MILLIGRAM(S): 80 TABLET, FILM COATED ORAL at 21:32

## 2022-03-24 RX ADMIN — Medication 3 MILLILITER(S): at 00:02

## 2022-03-24 RX ADMIN — Medication 500 MILLIGRAM(S): at 12:27

## 2022-03-24 RX ADMIN — CARVEDILOL PHOSPHATE 12.5 MILLIGRAM(S): 80 CAPSULE, EXTENDED RELEASE ORAL at 17:14

## 2022-03-24 RX ADMIN — Medication 1300 MILLIGRAM(S): at 13:48

## 2022-03-24 RX ADMIN — CARVEDILOL PHOSPHATE 12.5 MILLIGRAM(S): 80 CAPSULE, EXTENDED RELEASE ORAL at 06:01

## 2022-03-24 RX ADMIN — Medication 3 MILLILITER(S): at 12:27

## 2022-03-24 RX ADMIN — Medication 3 MILLILITER(S): at 06:02

## 2022-03-24 RX ADMIN — Medication 1 TABLET(S): at 12:27

## 2022-03-24 RX ADMIN — Medication 20 MILLIEQUIVALENT(S): at 21:32

## 2022-03-24 RX ADMIN — CHLORHEXIDINE GLUCONATE 1 APPLICATION(S): 213 SOLUTION TOPICAL at 06:02

## 2022-03-24 RX ADMIN — MAGNESIUM OXIDE 400 MG ORAL TABLET 400 MILLIGRAM(S): 241.3 TABLET ORAL at 12:27

## 2022-03-24 RX ADMIN — Medication 3 MILLILITER(S): at 17:15

## 2022-03-24 RX ADMIN — Medication 1300 MILLIGRAM(S): at 06:01

## 2022-03-24 RX ADMIN — PANTOPRAZOLE SODIUM 40 MILLIGRAM(S): 20 TABLET, DELAYED RELEASE ORAL at 06:01

## 2022-03-24 RX ADMIN — Medication 600 MILLIGRAM(S): at 06:01

## 2022-03-24 RX ADMIN — HEPARIN SODIUM 14 UNIT(S)/HR: 5000 INJECTION INTRAVENOUS; SUBCUTANEOUS at 07:38

## 2022-03-24 RX ADMIN — Medication 40 MILLIEQUIVALENT(S): at 13:48

## 2022-03-24 RX ADMIN — CLOPIDOGREL BISULFATE 75 MILLIGRAM(S): 75 TABLET, FILM COATED ORAL at 12:46

## 2022-03-24 RX ADMIN — Medication 40 MILLIEQUIVALENT(S): at 12:27

## 2022-03-24 RX ADMIN — Medication 600 MILLIGRAM(S): at 17:15

## 2022-03-24 RX ADMIN — CHLORHEXIDINE GLUCONATE 1 APPLICATION(S): 213 SOLUTION TOPICAL at 06:03

## 2022-03-24 RX ADMIN — Medication 1300 MILLIGRAM(S): at 21:31

## 2022-03-24 RX ADMIN — Medication 4 MILLIGRAM(S): at 21:31

## 2022-03-24 RX ADMIN — BUDESONIDE AND FORMOTEROL FUMARATE DIHYDRATE 2 PUFF(S): 160; 4.5 AEROSOL RESPIRATORY (INHALATION) at 08:53

## 2022-03-24 RX ADMIN — BUDESONIDE AND FORMOTEROL FUMARATE DIHYDRATE 2 PUFF(S): 160; 4.5 AEROSOL RESPIRATORY (INHALATION) at 21:32

## 2022-03-24 NOTE — PRE-ANESTHESIA EVALUATION ADULT - NSANTHADDINFOFT_GEN_ALL_CORE
IF no changes in symptoms, electrolyte disturbances, and/or events prior to planned procedure no contraindications to planned procedure however final decision/ recommendations to be determined by covering anesthesiologist.

## 2022-03-24 NOTE — PROGRESS NOTE ADULT - ASSESSMENT
76M w/ pmh of HTN, HLD, PVD s/p R AKA who was brought by EMS to [facility] after a mechanical fall from his wheelchair found to have CHF exacerbation, PNA as well as acute LLE arterial occlusions w/ unsuccessful angiogram. Stress test at OSH was positive, transferred for angiogram for cardiac clearance for possible LLE BKA

## 2022-03-24 NOTE — PROGRESS NOTE ADULT - PROBLEM SELECTOR PLAN 1
[facility] completed, LM 10%, LAD 60%, Cx and RCA  with collaterals   Per cardiology high risk patient undergoing intermediate risk surgery/procedure OPTIMIZED to the lowest risk predicted by the RCRI based on co-morbidities and nature of operation/procedure.   Continue medical management with Coreg, Lipitor, Plavix

## 2022-03-24 NOTE — PROGRESS NOTE ADULT - SUBJECTIVE AND OBJECTIVE BOX
VASCULAR SURGERY PROGRESS NOTE   ___________________________________________________________________    THI HASTINGS | 76y Male   [facility] 2DSU 254 D1   1945 | [id number]     LOS 13d    Attending: Ra Seo    ___________________________________________________________________      Allergies:  NKDA    OBJECTIVE:  Vitals:    T(C): 36.8 (22 @ 05:51), Max: 36.8 (22 @ 11:59)  HR: 89 (22 @ 05:51) (68 - 89)  BP: 117/66 (22 @ 05:51) (104/67 - 133/64)  RR: 16 (22 @ 05:51) (16 - 18)  SpO2: 94% (22 @ 05:51) (94% - 99%)      OUT:    Incontinent per Condom Catheter (mL): 350 mL    Stool (mL): 1 mL  Total OUT: 351 mL      OUT:  Total OUT: 0 mL          Medications:  albuterol/ipratropium for Nebulization 3 milliLiter(s) Nebulizer every 6 hours  budesonide 160 MICROgram(s)/formoterol 4.5 MICROgram(s) Inhaler 2 Puff(s) Inhalation two times a day  carvedilol 12.5 milliGRAM(s) Oral every 12 hours  doxazosin 4 milliGRAM(s) Oral at bedtime  guaiFENesin  milliGRAM(s) Oral every 12 hours  pantoprazole    Tablet 40 milliGRAM(s) Oral before breakfast          Laboratory:  WBC: 9.75 H&H: 9.4/30.4 Plt: 165  WBC: 11.31 H&H: 9.8/30.1 Plt: 193    Chemistry:                               Phos: xx M.7  138  |  105  |  19  ----------------------------<  95  3.2   |  16  |  1.69        ,                              Phos: xx M.9  138  |  106  |  18  ----------------------------<  110  3.4   |  17  |  1.64          PTT 67.6 PT/INR ---/---          Reviewed laboratory and imaging    clopidogrel Tablet 75 Oral daily  heparin  Infusion 1400 IV Continuous <Continuous>      COVID-19 PCR: Suha (18 Mar 2022 12:23)

## 2022-03-24 NOTE — PROGRESS NOTE ADULT - ASSESSMENT
76M w/ hx of HTN, HLD, PAD, right AKA, transferred from [facility] for cardiac cath after he underwent a LLE angiogram and had ST elevation during the procedure. Since transfer patient underwent diagnostic cardiac catheterization showing severe CAD in the LCx and RCA. Following discussion of GOC decision made to undergo amputation.     Recommendation   - Plan for OR for L AKA with Dr. Sterling; pending OR date  - Risk optimization documentation noted; high risk with highest possible optimization; cleared for surgery with current risk  - Will discuss with Anesthesia regarding current risk assessment  - Will follow with you     Enoch Sparks MD PGY2  Vascular Surgery Team  [phone]  76M w/ hx of HTN, HLD, PAD, right AKA, transferred from [facility] for cardiac cath after he underwent a LLE angiogram and had ST elevation during the procedure. Since transfer patient underwent diagnostic cardiac catheterization showing severe CAD in the LCx and RCA. Following discussion of GOC decision made to undergo amputation.     Recommendation   - Plan for OR for L AKA with Dr. Sterling; Tentative date for Monday  - Risk optimization documentation noted; high risk with highest possible optimization; cleared for surgery with current risk  - Appreciate pre-anesthesia evaluation  - Will follow with you     Enoch Sparks MD PGY2  Vascular Surgery Team  [phone]

## 2022-03-24 NOTE — PROGRESS NOTE ADULT - SUBJECTIVE AND OBJECTIVE BOX
Ra Seo MD    Patient is a 76y old  Male who presents with a chief complaint of Cardiac clearnace for ischemic limb (23 Mar 2022 16:51)        SUBJECTIVE / OVERNIGHT EVENTS: No new events  TELEMETRY: SR 60-90's, PVCs, PACs, TCG397 3.3 and 3.9 secs      MEDICATIONS  (STANDING):  albuterol/ipratropium for Nebulization 3 milliLiter(s) Nebulizer every 6 hours  ascorbic acid 500 milliGRAM(s) Oral daily  atorvastatin 40 milliGRAM(s) Oral at bedtime  budesonide 160 MICROgram(s)/formoterol 4.5 MICROgram(s) Inhaler 2 Puff(s) Inhalation two times a day  carvedilol 12.5 milliGRAM(s) Oral every 12 hours  chlorhexidine 2% Cloths 1 Application(s) Topical <User Schedule>  chlorhexidine 4% Liquid 1 Application(s) Topical <User Schedule>  clopidogrel Tablet 75 milliGRAM(s) Oral daily  doxazosin 4 milliGRAM(s) Oral at bedtime  guaiFENesin  milliGRAM(s) Oral every 12 hours  heparin  Infusion 1400 Unit(s)/Hr (14 mL/Hr) IV Continuous <Continuous>  insulin lispro (ADMELOG) corrective regimen sliding scale   SubCutaneous three times a day before meals  Nephro-carlton 1 Tablet(s) Oral daily  pantoprazole    Tablet 40 milliGRAM(s) Oral before breakfast  potassium chloride    Tablet ER 20 milliEquivalent(s) Oral every 2 hours  sodium bicarbonate 1300 milliGRAM(s) Oral three times a day    MEDICATIONS  (PRN):  acetaminophen     Tablet .. 650 milliGRAM(s) Oral every 6 hours PRN Moderate Pain (4 - 6)      Vital Signs Last 24 Hrs  T(C): 37.6 (24 Mar 2022 13:08), Max: 37.6 (24 Mar 2022 11:47)  T(F): 99.7 (24 Mar 2022 11:47), Max: 99.7 (24 Mar 2022 11:47)  HR: 98 (24 Mar 2022 13:08) (71 - 98)  BP: 144/65 (24 Mar 2022 13:08) (104/67 - 144/65)  BP(mean): 83 (24 Mar 2022 05:51) (83 - 83)  RR: 16 (24 Mar 2022 13:08) (16 - 18)  SpO2: 94% (24 Mar 2022 13:08) (94% - 96%)  CAPILLARY BLOOD GLUCOSE      POCT Blood Glucose.: 106 mg/dL (24 Mar 2022 11:23)  POCT Blood Glucose.: 114 mg/dL (24 Mar 2022 08:45)  POCT Blood Glucose.: 112 mg/dL (23 Mar 2022 21:40)  POCT Blood Glucose.: 113 mg/dL (23 Mar 2022 16:58)    I&O's Summary    23 Mar 2022 07:01  -  24 Mar 2022 07:00  --------------------------------------------------------  IN: 1055 mL / OUT: 351 mL / NET: 704 mL    24 Mar 2022 07:01  -  24 Mar 2022 14:42  --------------------------------------------------------  IN: 640 mL / OUT: 100 mL / NET: 540 mL          PHYSICAL EXAM  GENERAL: NAD, well-developed  HEAD:  Atraumatic, normocephalic  EYES: EOMI, PERRLA, conjunctiva and sclera clear  CHEST/LUNG: Clear to auscultation anteriorly; no wheezes  HEART: +S1+S2, regular rate and rhythm  ABDOMEN: Soft, nontender, mildly distended; bowel sounds present  EXTREMITIES:  R AKA; LLE cold to touch below knee, + edema  PSYCH: AAOx3      LABS:                        9.4    9.75  )-----------( 165      ( 24 Mar 2022 06:32 )             30.4     03-24    138  |  105  |  19  ----------------------------<  95  3.2<L>   |  16<L>  |  1.69<H>    Ca    8.7      24 Mar 2022 06:30  Mg     1.7     03-24      PTT - ( 24 Mar 2022 06:33 )  PTT:67.6 sec            RADIOLOGY & ADDITIONAL TESTS:    Imaging Personally Reviewed:  Consultant(s) Notes Reviewed:    Care Discussed with Consultants/Other Providers:

## 2022-03-25 LAB
ANION GAP SERPL CALC-SCNC: 15 MMOL/L — SIGNIFICANT CHANGE UP (ref 5–17)
APTT BLD: 71.3 SEC — HIGH (ref 27.5–35.5)
APTT BLD: 75.4 SEC — HIGH (ref 27.5–35.5)
APTT BLD: 90.7 SEC — HIGH (ref 27.5–35.5)
BUN SERPL-MCNC: 16 MG/DL — SIGNIFICANT CHANGE UP (ref 7–23)
CALCIUM SERPL-MCNC: 8.4 MG/DL — SIGNIFICANT CHANGE UP (ref 8.4–10.5)
CHLORIDE SERPL-SCNC: 107 MMOL/L — SIGNIFICANT CHANGE UP (ref 96–108)
CO2 SERPL-SCNC: 18 MMOL/L — LOW (ref 22–31)
CREAT SERPL-MCNC: 1.5 MG/DL — HIGH (ref 0.5–1.3)
EGFR: 49 ML/MIN/1.73M2 — LOW
GLUCOSE BLDC GLUCOMTR-MCNC: 103 MG/DL — HIGH (ref 70–99)
GLUCOSE BLDC GLUCOMTR-MCNC: 105 MG/DL — HIGH (ref 70–99)
GLUCOSE BLDC GLUCOMTR-MCNC: 116 MG/DL — HIGH (ref 70–99)
GLUCOSE BLDC GLUCOMTR-MCNC: 121 MG/DL — HIGH (ref 70–99)
GLUCOSE SERPL-MCNC: 102 MG/DL — HIGH (ref 70–99)
HCT VFR BLD CALC: 25.8 % — LOW (ref 39–50)
HGB BLD-MCNC: 8.6 G/DL — LOW (ref 13–17)
MAGNESIUM SERPL-MCNC: 1.6 MG/DL — SIGNIFICANT CHANGE UP (ref 1.6–2.6)
MCHC RBC-ENTMCNC: 26 PG — LOW (ref 27–34)
MCHC RBC-ENTMCNC: 33.3 GM/DL — SIGNIFICANT CHANGE UP (ref 32–36)
MCV RBC AUTO: 77.9 FL — LOW (ref 80–100)
NRBC # BLD: 0 /100 WBCS — SIGNIFICANT CHANGE UP (ref 0–0)
PLATELET # BLD AUTO: 234 K/UL — SIGNIFICANT CHANGE UP (ref 150–400)
POTASSIUM SERPL-MCNC: 2.7 MMOL/L — CRITICAL LOW (ref 3.5–5.3)
POTASSIUM SERPL-SCNC: 2.7 MMOL/L — CRITICAL LOW (ref 3.5–5.3)
RBC # BLD: 3.31 M/UL — LOW (ref 4.2–5.8)
RBC # FLD: 16.3 % — HIGH (ref 10.3–14.5)
SODIUM SERPL-SCNC: 140 MMOL/L — SIGNIFICANT CHANGE UP (ref 135–145)
WBC # BLD: 11.65 K/UL — HIGH (ref 3.8–10.5)
WBC # FLD AUTO: 11.65 K/UL — HIGH (ref 3.8–10.5)

## 2022-03-25 PROCEDURE — 99232 SBSQ HOSP IP/OBS MODERATE 35: CPT

## 2022-03-25 PROCEDURE — 99232 SBSQ HOSP IP/OBS MODERATE 35: CPT | Mod: GC

## 2022-03-25 RX ORDER — MAGNESIUM SULFATE 500 MG/ML
1 VIAL (ML) INJECTION ONCE
Refills: 0 | Status: COMPLETED | OUTPATIENT
Start: 2022-03-25 | End: 2022-03-25

## 2022-03-25 RX ORDER — POTASSIUM CHLORIDE 20 MEQ
40 PACKET (EA) ORAL
Refills: 0 | Status: COMPLETED | OUTPATIENT
Start: 2022-03-25 | End: 2022-03-25

## 2022-03-25 RX ORDER — POTASSIUM CHLORIDE 20 MEQ
20 PACKET (EA) ORAL
Refills: 0 | Status: COMPLETED | OUTPATIENT
Start: 2022-03-25 | End: 2022-03-25

## 2022-03-25 RX ORDER — POTASSIUM CHLORIDE 20 MEQ
40 PACKET (EA) ORAL EVERY 4 HOURS
Refills: 0 | Status: DISCONTINUED | OUTPATIENT
Start: 2022-03-25 | End: 2022-03-25

## 2022-03-25 RX ADMIN — Medication 50 MILLIEQUIVALENT(S): at 06:41

## 2022-03-25 RX ADMIN — Medication 1300 MILLIGRAM(S): at 22:20

## 2022-03-25 RX ADMIN — Medication 4 MILLIGRAM(S): at 22:20

## 2022-03-25 RX ADMIN — Medication 600 MILLIGRAM(S): at 05:23

## 2022-03-25 RX ADMIN — BUDESONIDE AND FORMOTEROL FUMARATE DIHYDRATE 2 PUFF(S): 160; 4.5 AEROSOL RESPIRATORY (INHALATION) at 22:15

## 2022-03-25 RX ADMIN — Medication 500 MILLIGRAM(S): at 12:50

## 2022-03-25 RX ADMIN — HEPARIN SODIUM 13.5 UNIT(S)/HR: 5000 INJECTION INTRAVENOUS; SUBCUTANEOUS at 17:05

## 2022-03-25 RX ADMIN — CARVEDILOL PHOSPHATE 12.5 MILLIGRAM(S): 80 CAPSULE, EXTENDED RELEASE ORAL at 05:23

## 2022-03-25 RX ADMIN — BUDESONIDE AND FORMOTEROL FUMARATE DIHYDRATE 2 PUFF(S): 160; 4.5 AEROSOL RESPIRATORY (INHALATION) at 08:58

## 2022-03-25 RX ADMIN — CLOPIDOGREL BISULFATE 75 MILLIGRAM(S): 75 TABLET, FILM COATED ORAL at 12:50

## 2022-03-25 RX ADMIN — CHLORHEXIDINE GLUCONATE 1 APPLICATION(S): 213 SOLUTION TOPICAL at 05:24

## 2022-03-25 RX ADMIN — Medication 40 MILLIEQUIVALENT(S): at 12:49

## 2022-03-25 RX ADMIN — Medication 100 GRAM(S): at 15:00

## 2022-03-25 RX ADMIN — CARVEDILOL PHOSPHATE 12.5 MILLIGRAM(S): 80 CAPSULE, EXTENDED RELEASE ORAL at 17:06

## 2022-03-25 RX ADMIN — HEPARIN SODIUM 13.5 UNIT(S)/HR: 5000 INJECTION INTRAVENOUS; SUBCUTANEOUS at 06:14

## 2022-03-25 RX ADMIN — HEPARIN SODIUM 13.5 UNIT(S)/HR: 5000 INJECTION INTRAVENOUS; SUBCUTANEOUS at 08:58

## 2022-03-25 RX ADMIN — Medication 3 MILLILITER(S): at 17:06

## 2022-03-25 RX ADMIN — Medication 40 MILLIEQUIVALENT(S): at 17:04

## 2022-03-25 RX ADMIN — Medication 3 MILLILITER(S): at 12:50

## 2022-03-25 RX ADMIN — PANTOPRAZOLE SODIUM 40 MILLIGRAM(S): 20 TABLET, DELAYED RELEASE ORAL at 05:23

## 2022-03-25 RX ADMIN — Medication 50 MILLIEQUIVALENT(S): at 08:56

## 2022-03-25 RX ADMIN — Medication 1 TABLET(S): at 12:50

## 2022-03-25 RX ADMIN — Medication 1300 MILLIGRAM(S): at 05:22

## 2022-03-25 RX ADMIN — CHLORHEXIDINE GLUCONATE 1 APPLICATION(S): 213 SOLUTION TOPICAL at 05:23

## 2022-03-25 RX ADMIN — Medication 3 MILLILITER(S): at 05:22

## 2022-03-25 RX ADMIN — Medication 50 MILLIEQUIVALENT(S): at 12:46

## 2022-03-25 RX ADMIN — Medication 1300 MILLIGRAM(S): at 14:57

## 2022-03-25 RX ADMIN — Medication 600 MILLIGRAM(S): at 17:06

## 2022-03-25 RX ADMIN — ATORVASTATIN CALCIUM 40 MILLIGRAM(S): 80 TABLET, FILM COATED ORAL at 22:20

## 2022-03-25 RX ADMIN — Medication 40 MILLIEQUIVALENT(S): at 14:56

## 2022-03-25 NOTE — PROGRESS NOTE ADULT - SUBJECTIVE AND OBJECTIVE BOX
Ra Seo MD    Patient is a 76y old  Male who presents with a chief complaint of Cardiac clearnace for ischemic limb (25 Mar 2022 09:46)        SUBJECTIVE / OVERNIGHT EVENTS: No new events  TELEMETRY: SR 80-90's, PACs, PVCs      MEDICATIONS  (STANDING):  albuterol/ipratropium for Nebulization 3 milliLiter(s) Nebulizer every 6 hours  ascorbic acid 500 milliGRAM(s) Oral daily  atorvastatin 40 milliGRAM(s) Oral at bedtime  budesonide 160 MICROgram(s)/formoterol 4.5 MICROgram(s) Inhaler 2 Puff(s) Inhalation two times a day  carvedilol 12.5 milliGRAM(s) Oral every 12 hours  chlorhexidine 2% Cloths 1 Application(s) Topical <User Schedule>  chlorhexidine 4% Liquid 1 Application(s) Topical <User Schedule>  clopidogrel Tablet 75 milliGRAM(s) Oral daily  doxazosin 4 milliGRAM(s) Oral at bedtime  guaiFENesin  milliGRAM(s) Oral every 12 hours  heparin  Infusion 1400 Unit(s)/Hr (13.5 mL/Hr) IV Continuous <Continuous>  insulin lispro (ADMELOG) corrective regimen sliding scale   SubCutaneous three times a day before meals  magnesium sulfate  IVPB 1 Gram(s) IV Intermittent once  Nephro-carlton 1 Tablet(s) Oral daily  pantoprazole    Tablet 40 milliGRAM(s) Oral before breakfast  potassium chloride   Powder 40 milliEquivalent(s) Oral every 2 hours  potassium chloride  20 mEq/100 mL IVPB 20 milliEquivalent(s) IV Intermittent every 2 hours  sodium bicarbonate 1300 milliGRAM(s) Oral three times a day    MEDICATIONS  (PRN):  acetaminophen     Tablet .. 650 milliGRAM(s) Oral every 6 hours PRN Moderate Pain (4 - 6)      Vital Signs Last 24 Hrs  T(C): 36.7 (25 Mar 2022 04:14), Max: 37.6 (24 Mar 2022 11:47)  T(F): 98.1 (25 Mar 2022 04:14), Max: 99.7 (24 Mar 2022 11:47)  HR: 74 (25 Mar 2022 04:14) (74 - 98)  BP: 143/69 (25 Mar 2022 04:14) (130/58 - 144/65)  BP(mean): --  RR: 18 (25 Mar 2022 04:14) (16 - 18)  SpO2: 96% (25 Mar 2022 04:14) (92% - 96%)  CAPILLARY BLOOD GLUCOSE      POCT Blood Glucose.: 103 mg/dL (25 Mar 2022 08:16)  POCT Blood Glucose.: 121 mg/dL (24 Mar 2022 21:53)  POCT Blood Glucose.: 110 mg/dL (24 Mar 2022 17:20)    I&O's Summary    24 Mar 2022 07:01  -  25 Mar 2022 07:00  --------------------------------------------------------  IN: 640 mL / OUT: 101 mL / NET: 539 mL          PHYSICAL EXAM  GENERAL: NAD, well-developed  HEAD:  Atraumatic, normocephalic  EYES: EOMI, PERRLA, conjunctiva and sclera clear  CHEST/LUNG: Clear to auscultation anteriorly; no wheezes  HEART: +S1+S2, regular rate and rhythm  ABDOMEN: Soft, nontender, mildly distended; bowel sounds present  EXTREMITIES:  R AKA; LLE cold to touch below knee, + edema  PSYCH: AAOx3    LABS:                        8.6    11.65 )-----------( 234      ( 25 Mar 2022 05:36 )             25.8     03-25    140  |  107  |  16  ----------------------------<  102<H>  2.7<LL>   |  18<L>  |  1.50<H>    Ca    8.4      25 Mar 2022 05:36  Mg     1.6     03-25      PTT - ( 25 Mar 2022 05:36 )  PTT:90.7 sec            RADIOLOGY & ADDITIONAL TESTS:    Imaging Personally Reviewed:  Consultant(s) Notes Reviewed:    Care Discussed with Consultants/Other Providers:

## 2022-03-25 NOTE — PROGRESS NOTE ADULT - PROBLEM SELECTOR PLAN 3
Baseline unknown but improved  Renal sono with decreased echogenicty c/w parenchymal disease  Continue Sodium Bicarb  Hypernatremia- resolved- encourage free water intake  Hypokalemia- aggressively replete (IV/PO), replete magnesium as well

## 2022-03-25 NOTE — PROGRESS NOTE ADULT - ASSESSMENT
76M w/ pmh of HTN, HLD, PVD s/p R AKA who was brought by EMS to [facility] after a mechanical fall from his wheelchair found to have CHF exacerbation, PNA as well as acute LLE arterial occlusions w/ unsuccessful angiogram. Stress test at OSH was positive, transferred for angiogram for cardiac clearance for possible LLE BKA

## 2022-03-25 NOTE — PROGRESS NOTE ADULT - ASSESSMENT
76M w/ hx of HTN, HLD, PAD, right AKA, transferred from [facility] for cardiac cath after he underwent a LLE angiogram and had ST elevation during the procedure. Since transfer patient underwent diagnostic cardiac catheterization showing severe CAD in the LCx and RCA. Following discussion of GOC decision made to undergo amputation.     Recommendation   - Plan for OR for L AKA with Dr. Sterling; Tentative date for Monday  - Risk optimization documentation noted; high risk with highest possible optimization; cleared for surgery with current risk  - Appreciate pre-anesthesia evaluation  - Preoperative workup:     O COVID within 4  days of OR      O NPO at Midnight day before OR     O PTT and PT/INR within 72 hours of OR (collect one day prior to OR)     O CBC, BMP, Mg, Phos drawn at 4AM to provide time for correction if needed (STAT activatable)     O Type and Screen X2 (One within 72 hours of OR and at least one other this admission)     O 2u of pRBC on hold for OR     O Documented optimized/cleared for OR by primary team     O No HD prior to surgery (can be postop)   - Will follow with you     Enoch Sparks MD PGY2  Vascular Surgery Team  [phone]

## 2022-03-25 NOTE — PROGRESS NOTE ADULT - SUBJECTIVE AND OBJECTIVE BOX
VASCULAR SURGERY PROGRESS NOTE   ___________________________________________________________________    THI HASTINGS | 76y Male   [facility] 2DSU 254 D1   1945 | [id number]     LOS 14d    Attending: Ra Seo    ___________________________________________________________________      Allergies:  NKDA    OBJECTIVE:  Vitals:  Height (cm): 154.2  Weight (kg): 71.3  BMI (kg/m2): 30  T(C): 36.7 (22 @ 04:14), Max: 37.6 (22 @ 11:47)  HR: 74 (22 @ 04:14) (74 - 98)  BP: 143/69 (22 @ 04:14) (130/58 - 144/65)  RR: 18 (22 @ 04:14) (16 - 18)  SpO2: 96% (22 @ 04:14) (92% - 96%)      OUT:    Stool (mL): 1 mL    Voided (mL): 100 mL  Total OUT: 101 mL          Medications:  albuterol/ipratropium for Nebulization 3 milliLiter(s) Nebulizer every 6 hours  budesonide 160 MICROgram(s)/formoterol 4.5 MICROgram(s) Inhaler 2 Puff(s) Inhalation two times a day  carvedilol 12.5 milliGRAM(s) Oral every 12 hours  doxazosin 4 milliGRAM(s) Oral at bedtime  guaiFENesin  milliGRAM(s) Oral every 12 hours  pantoprazole    Tablet 40 milliGRAM(s) Oral before breakfast          Laboratory:  WBC: 11.65 H&H: 8.6/25.8 Plt: 234  WBC: 9.75 H&H: 9.4/30.4 Plt: 165    Chemistry:                               Phos: xx M.6  140  |  107  |  16  ----------------------------<  102  2.7   |  18  |  1.50        ,                              Phos: xx M.7  138  |  105  |  19  ----------------------------<  95  3.2   |  16  |  1.69          PTT 90.7 PT/INR ---/---          Reviewed laboratory and imaging    clopidogrel Tablet 75 Oral daily  heparin  Infusion 1400 IV Continuous <Continuous>      COVID-19 PCR: Suha (24 Mar 2022 06:35)

## 2022-03-25 NOTE — PROGRESS NOTE ADULT - PROBLEM SELECTOR PLAN 2
Improved/stable- likely source LLE  Blood cultures (3/15) No growth  Seen by ID previously, observing off antibiotics but start Zosyn if febrile for possible LLE source

## 2022-03-25 NOTE — PROGRESS NOTE ADULT - PROBLEM SELECTOR PLAN 1
[facility] completed, LM 10%, LAD 60%, Cx and RCA  with collaterals   Per cardiology high risk patient undergoing intermediate risk surgery/procedure OPTIMIZED to the lowest risk predicted by the RCRI based on co-morbidities and nature of operation/procedure.   Continue medical management with Coreg, Lipitor, Plavix

## 2022-03-25 NOTE — PROGRESS NOTE ADULT - PROBLEM SELECTOR PLAN 1
BAUDILIO vs BAUDILIO on CKD. Pt with no prior baseline Scr. Pt likely with underlying CKD.   Baseline unknown. No prior labs on [facility].   SCr was elevated at 3.06 on 3/11/22 and improved to 1.75. Pt. with no prior CKD work up. Renal function plateauing, Scr stable at 1.5 and pt is nonoliguric. Pt. likely with new baseline. Pt with [place] cath, bladder scan prn to monitor PVR and urinary retention. Replete K and Magnesium.  Monitor labs and urine output. Avoid NSAIDs, ACEI/ARBS, RCA and nephrotoxins. Dose medications as per eGFR.

## 2022-03-25 NOTE — PROGRESS NOTE ADULT - ATTENDING COMMENTS
I have seen this patient with the fellow and agree with their assessment and plan. I was physically present for significant portions of the evaluation and management (E/M) service provided.  I agree with the above history, physical, and plan which I have reviewed and edited where appropriate.    Renea Pereira MD  Pager   Office     Contact me directly via [employer] Teams     (After 5 pm or on weekends please page the on-call fellow/attending, can check [email or web address] for schedule. Login is jamilah woo, schedule under [facility] medicine, psych, derm)

## 2022-03-25 NOTE — PROGRESS NOTE ADULT - SUBJECTIVE AND OBJECTIVE BOX
[facility] DIVISION OF KIDNEY DISEASES AND HYPERTENSION -- FOLLOW UP NOTE  --------------------------------------------------------------------------------  Chief Complaint: Eri on CKD    24 hour events/subjective: Pt. seen and examined today, reports feeling okay. Scr stable at 1.6.      PAST HISTORY  --------------------------------------------------------------------------------  No significant changes to PMH, PSH, FHx, SHx, unless otherwise noted    ALLERGIES & MEDICATIONS  --------------------------------------------------------------------------------  Allergies    No Known Allergies    Intolerances      Standing Inpatient Medications  albuterol/ipratropium for Nebulization 3 milliLiter(s) Nebulizer every 6 hours  ascorbic acid 500 milliGRAM(s) Oral daily  atorvastatin 40 milliGRAM(s) Oral at bedtime  budesonide 160 MICROgram(s)/formoterol 4.5 MICROgram(s) Inhaler 2 Puff(s) Inhalation two times a day  carvedilol 12.5 milliGRAM(s) Oral every 12 hours  chlorhexidine 2% Cloths 1 Application(s) Topical <User Schedule>  chlorhexidine 4% Liquid 1 Application(s) Topical <User Schedule>  clopidogrel Tablet 75 milliGRAM(s) Oral daily  doxazosin 4 milliGRAM(s) Oral at bedtime  guaiFENesin  milliGRAM(s) Oral every 12 hours  heparin  Infusion 1400 Unit(s)/Hr IV Continuous <Continuous>  insulin lispro (ADMELOG) corrective regimen sliding scale   SubCutaneous three times a day before meals  magnesium sulfate  IVPB 1 Gram(s) IV Intermittent once  Nephro-carlton 1 Tablet(s) Oral daily  pantoprazole    Tablet 40 milliGRAM(s) Oral before breakfast  potassium chloride    Tablet ER 40 milliEquivalent(s) Oral every 4 hours  potassium chloride  20 mEq/100 mL IVPB 20 milliEquivalent(s) IV Intermittent every 2 hours  sodium bicarbonate 1300 milliGRAM(s) Oral three times a day    PRN Inpatient Medications  acetaminophen     Tablet .. 650 milliGRAM(s) Oral every 6 hours PRN      REVIEW OF SYSTEMS  --------------------------------------------------------------------------------  Gen: weakness+  Skin: No rashes  Head/Eyes/Ears: Normal hearing,   Respiratory: No dyspnea, cough  CV: No chest pain  GI: No abdominal pain, diarrhea  : as per HPI,   MSK: No  edema  All other systems were reviewed and are negative, except as noted.    VITALS/PHYSICAL EXAM  --------------------------------------------------------------------------------  T(C): 36.7 (03-25-22 @ 04:14), Max: 37.6 (03-24-22 @ 11:47)  HR: 74 (03-25-22 @ 04:14) (74 - 98)  BP: 143/69 (03-25-22 @ 04:14) (130/58 - 144/65)  RR: 18 (03-25-22 @ 04:14) (16 - 18)  SpO2: 96% (03-25-22 @ 04:14) (92% - 96%)  Wt(kg): --  Height (cm): 154.2 (03-24-22 @ 13:08)  Weight (kg): 71.3 (03-24-22 @ 13:08)  BMI (kg/m2): 30 (03-24-22 @ 13:08)  BSA (m2): 1.7 (03-24-22 @ 13:08)      03-24-22 @ 07:01  -  03-25-22 @ 07:00  --------------------------------------------------------  IN: 640 mL / OUT: 101 mL / NET: 539 mL        Physical Exam:  	Gen: NAD  	HEENT: MMM  	Pulm: CTA B/L  	CV: S1S2  	Abd: Soft, +BS   	Ext: R AKA, NO edema LE  	Neuro: Awake, alert  	Skin: Warm and dry          : [place] cath+ with clear urine  	Skin: Warm and dry    LABS/STUDIES  --------------------------------------------------------------------------------              8.6    11.65 >-----------<  234      [03-25-22 @ 05:36]              25.8     140  |  107  |  16  ----------------------------<  102      [03-25-22 @ 05:36]  2.7   |  18  |  1.50        Ca     8.4     [03-25-22 @ 05:36]      Mg     1.6     [03-25-22 @ 05:36]    Creatinine Trend:  SCr 1.50 [03-25 @ 05:36]  SCr 1.69 [03-24 @ 06:30]  SCr 1.64 [03-23 @ 06:42]  SCr 1.66 [03-22 @ 07:21]  SCr 1.69 [03-22 @ 00:13]    Urinalysis - [03-15-22 @ 02:23]      Color Light Yellow / Appearance Slightly Turbid / SG 1.012 / pH 5.5      Gluc Negative / Ketone Negative  / Bili Negative / Urobili Negative       Blood Large / Protein 30 mg/dL / Leuk Est Negative / Nitrite Negative      RBC 51 / WBC 6 / Hyaline 7 / Gran 2 / Sq Epi  / Non Sq Epi 2 / Bacteria Negative      HCV 0.16, Nonreact      [03-12-22 @ 00:43]

## 2022-03-26 LAB
ANION GAP SERPL CALC-SCNC: 12 MMOL/L — SIGNIFICANT CHANGE UP (ref 5–17)
APTT BLD: 66.2 SEC — HIGH (ref 27.5–35.5)
BUN SERPL-MCNC: 15 MG/DL — SIGNIFICANT CHANGE UP (ref 7–23)
CALCIUM SERPL-MCNC: 8.7 MG/DL — SIGNIFICANT CHANGE UP (ref 8.4–10.5)
CHLORIDE SERPL-SCNC: 109 MMOL/L — HIGH (ref 96–108)
CO2 SERPL-SCNC: 19 MMOL/L — LOW (ref 22–31)
CREAT SERPL-MCNC: 1.42 MG/DL — HIGH (ref 0.5–1.3)
EGFR: 52 ML/MIN/1.73M2 — LOW
GLUCOSE BLDC GLUCOMTR-MCNC: 107 MG/DL — HIGH (ref 70–99)
GLUCOSE BLDC GLUCOMTR-MCNC: 110 MG/DL — HIGH (ref 70–99)
GLUCOSE BLDC GLUCOMTR-MCNC: 116 MG/DL — HIGH (ref 70–99)
GLUCOSE SERPL-MCNC: 109 MG/DL — HIGH (ref 70–99)
HCT VFR BLD CALC: 26.9 % — LOW (ref 39–50)
HGB BLD-MCNC: 9 G/DL — LOW (ref 13–17)
MAGNESIUM SERPL-MCNC: 1.8 MG/DL — SIGNIFICANT CHANGE UP (ref 1.6–2.6)
MCHC RBC-ENTMCNC: 26.2 PG — LOW (ref 27–34)
MCHC RBC-ENTMCNC: 33.5 GM/DL — SIGNIFICANT CHANGE UP (ref 32–36)
MCV RBC AUTO: 78.4 FL — LOW (ref 80–100)
NRBC # BLD: 0 /100 WBCS — SIGNIFICANT CHANGE UP (ref 0–0)
PLATELET # BLD AUTO: 235 K/UL — SIGNIFICANT CHANGE UP (ref 150–400)
POTASSIUM SERPL-MCNC: 3.3 MMOL/L — LOW (ref 3.5–5.3)
POTASSIUM SERPL-SCNC: 3.3 MMOL/L — LOW (ref 3.5–5.3)
RBC # BLD: 3.43 M/UL — LOW (ref 4.2–5.8)
RBC # FLD: 16.8 % — HIGH (ref 10.3–14.5)
SODIUM SERPL-SCNC: 140 MMOL/L — SIGNIFICANT CHANGE UP (ref 135–145)
WBC # BLD: 9.21 K/UL — SIGNIFICANT CHANGE UP (ref 3.8–10.5)
WBC # FLD AUTO: 9.21 K/UL — SIGNIFICANT CHANGE UP (ref 3.8–10.5)

## 2022-03-26 PROCEDURE — 99232 SBSQ HOSP IP/OBS MODERATE 35: CPT

## 2022-03-26 RX ORDER — POTASSIUM CHLORIDE 20 MEQ
40 PACKET (EA) ORAL EVERY 4 HOURS
Refills: 0 | Status: COMPLETED | OUTPATIENT
Start: 2022-03-26 | End: 2022-03-26

## 2022-03-26 RX ADMIN — Medication 3 MILLILITER(S): at 00:10

## 2022-03-26 RX ADMIN — CHLORHEXIDINE GLUCONATE 1 APPLICATION(S): 213 SOLUTION TOPICAL at 05:38

## 2022-03-26 RX ADMIN — Medication 1 TABLET(S): at 12:24

## 2022-03-26 RX ADMIN — Medication 600 MILLIGRAM(S): at 17:46

## 2022-03-26 RX ADMIN — BUDESONIDE AND FORMOTEROL FUMARATE DIHYDRATE 2 PUFF(S): 160; 4.5 AEROSOL RESPIRATORY (INHALATION) at 08:12

## 2022-03-26 RX ADMIN — CLOPIDOGREL BISULFATE 75 MILLIGRAM(S): 75 TABLET, FILM COATED ORAL at 12:25

## 2022-03-26 RX ADMIN — Medication 1300 MILLIGRAM(S): at 21:12

## 2022-03-26 RX ADMIN — Medication 40 MILLIEQUIVALENT(S): at 14:29

## 2022-03-26 RX ADMIN — Medication 40 MILLIEQUIVALENT(S): at 17:46

## 2022-03-26 RX ADMIN — Medication 3 MILLILITER(S): at 12:24

## 2022-03-26 RX ADMIN — CHLORHEXIDINE GLUCONATE 1 APPLICATION(S): 213 SOLUTION TOPICAL at 05:39

## 2022-03-26 RX ADMIN — PANTOPRAZOLE SODIUM 40 MILLIGRAM(S): 20 TABLET, DELAYED RELEASE ORAL at 08:12

## 2022-03-26 RX ADMIN — ATORVASTATIN CALCIUM 40 MILLIGRAM(S): 80 TABLET, FILM COATED ORAL at 21:12

## 2022-03-26 RX ADMIN — Medication 4 MILLIGRAM(S): at 21:12

## 2022-03-26 RX ADMIN — Medication 3 MILLILITER(S): at 17:45

## 2022-03-26 RX ADMIN — Medication 3 MILLILITER(S): at 23:41

## 2022-03-26 RX ADMIN — HEPARIN SODIUM 13.5 UNIT(S)/HR: 5000 INJECTION INTRAVENOUS; SUBCUTANEOUS at 12:24

## 2022-03-26 RX ADMIN — CARVEDILOL PHOSPHATE 12.5 MILLIGRAM(S): 80 CAPSULE, EXTENDED RELEASE ORAL at 17:46

## 2022-03-26 RX ADMIN — Medication 3 MILLILITER(S): at 05:31

## 2022-03-26 RX ADMIN — Medication 1300 MILLIGRAM(S): at 14:29

## 2022-03-26 RX ADMIN — BUDESONIDE AND FORMOTEROL FUMARATE DIHYDRATE 2 PUFF(S): 160; 4.5 AEROSOL RESPIRATORY (INHALATION) at 21:13

## 2022-03-26 RX ADMIN — Medication 500 MILLIGRAM(S): at 12:25

## 2022-03-26 RX ADMIN — CARVEDILOL PHOSPHATE 12.5 MILLIGRAM(S): 80 CAPSULE, EXTENDED RELEASE ORAL at 05:32

## 2022-03-26 RX ADMIN — Medication 1300 MILLIGRAM(S): at 05:31

## 2022-03-26 RX ADMIN — Medication 600 MILLIGRAM(S): at 05:31

## 2022-03-26 NOTE — PROGRESS NOTE ADULT - SUBJECTIVE AND OBJECTIVE BOX
[facility] Division of Hospital Medicine  Jessica VigilDO available via microsoft teams     Patient is a 76y old  Male who presents with a chief complaint of Cardiac clearance for ischemic limb (25 Mar 2022 11:27)      SUBJECTIVE / OVERNIGHT EVENTS:  No acute complaints this AM. Feels well    MEDICATIONS  (STANDING):  albuterol/ipratropium for Nebulization 3 milliLiter(s) Nebulizer every 6 hours  ascorbic acid 500 milliGRAM(s) Oral daily  atorvastatin 40 milliGRAM(s) Oral at bedtime  budesonide 160 MICROgram(s)/formoterol 4.5 MICROgram(s) Inhaler 2 Puff(s) Inhalation two times a day  carvedilol 12.5 milliGRAM(s) Oral every 12 hours  chlorhexidine 2% Cloths 1 Application(s) Topical <User Schedule>  chlorhexidine 4% Liquid 1 Application(s) Topical <User Schedule>  clopidogrel Tablet 75 milliGRAM(s) Oral daily  doxazosin 4 milliGRAM(s) Oral at bedtime  guaiFENesin  milliGRAM(s) Oral every 12 hours  heparin  Infusion 1400 Unit(s)/Hr (13.5 mL/Hr) IV Continuous <Continuous>  insulin lispro (ADMELOG) corrective regimen sliding scale   SubCutaneous three times a day before meals  Nephro-carlton 1 Tablet(s) Oral daily  pantoprazole    Tablet 40 milliGRAM(s) Oral before breakfast  potassium chloride   Powder 40 milliEquivalent(s) Oral every 4 hours  sodium bicarbonate 1300 milliGRAM(s) Oral three times a day    MEDICATIONS  (PRN):  acetaminophen     Tablet .. 650 milliGRAM(s) Oral every 6 hours PRN Moderate Pain (4 - 6)      CAPILLARY BLOOD GLUCOSE      POCT Blood Glucose.: 107 mg/dL (26 Mar 2022 12:01)  POCT Blood Glucose.: 110 mg/dL (26 Mar 2022 08:30)  POCT Blood Glucose.: 116 mg/dL (25 Mar 2022 21:33)  POCT Blood Glucose.: 105 mg/dL (25 Mar 2022 17:01)    I&O's Summary    25 Mar 2022 07:01  -  26 Mar 2022 07:00  --------------------------------------------------------  IN: 300 mL / OUT: 0 mL / NET: 300 mL    26 Mar 2022 07:01  -  26 Mar 2022 16:40  --------------------------------------------------------  IN: 240 mL / OUT: 1 mL / NET: 239 mL        PHYSICAL EXAM:  Vital Signs Last 24 Hrs  T(C): 36.7 (26 Mar 2022 11:44), Max: 36.7 (25 Mar 2022 20:48)  T(F): 98.1 (26 Mar 2022 11:44), Max: 98.1 (25 Mar 2022 20:48)  HR: 77 (26 Mar 2022 11:44) (62 - 77)  BP: 130/70 (26 Mar 2022 11:44) (118/66 - 135/72)  BP(mean): --  RR: 18 (26 Mar 2022 11:44) (18 - 18)  SpO2: 98% (26 Mar 2022 11:44) (95% - 98%)    CONSTITUTIONAL: NAD, well-developed  ENMT: Moist oral mucosa  RESPIRATORY: Normal respiratory effort; lungs are clear to auscultation bilaterally  CARDIOVASCULAR: Regular rate and rhythm, normal S1 and S2  ABDOMEN: Nontender to palpation, normoactive bowel sounds, no rebound/guarding  MUSCULOSKELETAL: no clubbing or cyanosis of digits; no joint swelling or tenderness to palpation, R AKA, LLE cool  PSYCH: A+O to person, place, and time; affect appropriate      LABS:                        9.0    9.21  )-----------( 235      ( 26 Mar 2022 06:32 )             26.9     03-26    140  |  109<H>  |  15  ----------------------------<  109<H>  3.3<L>   |  19<L>  |  1.42<H>    Ca    8.7      26 Mar 2022 06:32  Mg     1.8     03-26      PTT - ( 26 Mar 2022 07:20 )  PTT:66.2 sec            RADIOLOGY & ADDITIONAL TESTS:  Results Reviewed:   Imaging Personally Reviewed:  Electrocardiogram Personally Reviewed:    COORDINATION OF CARE:  Care Discussed with Consultants/Other Providers [Y/N]:  Prior or Outpatient Records Reviewed [Y/N]:

## 2022-03-26 NOTE — PROGRESS NOTE ADULT - PROBLEM SELECTOR PLAN 1
[facility] completed, LM 10%, LAD 60%, Cx and RCA  with collaterals   Per cardiology high risk patient undergoing intermediate risk surgery/procedure OPTIMIZED to the lowest risk predicted by the RCRI based on co-morbidities and nature of operation/procedure.   Continue medical management with Coreg, Lipitor, Plavix

## 2022-03-26 NOTE — PROGRESS NOTE ADULT - ASSESSMENT
76M w/ pmh of HTN, HLD, PVD s/p R AKA who was brought by EMS to [facility] after a mechanical fall from his wheelchair found to have CHF exacerbation, PNA as well as acute LLE arterial occlusions w/ unsuccessful angiogram. Stress test at OSH was positive, transferred for angiogram for cardiac clearance for possible LLE BKA

## 2022-03-27 LAB
ANION GAP SERPL CALC-SCNC: 12 MMOL/L — SIGNIFICANT CHANGE UP (ref 5–17)
APTT BLD: 84.2 SEC — HIGH (ref 27.5–35.5)
BUN SERPL-MCNC: 13 MG/DL — SIGNIFICANT CHANGE UP (ref 7–23)
CALCIUM SERPL-MCNC: 8.8 MG/DL — SIGNIFICANT CHANGE UP (ref 8.4–10.5)
CHLORIDE SERPL-SCNC: 107 MMOL/L — SIGNIFICANT CHANGE UP (ref 96–108)
CO2 SERPL-SCNC: 20 MMOL/L — LOW (ref 22–31)
CREAT SERPL-MCNC: 1.35 MG/DL — HIGH (ref 0.5–1.3)
EGFR: 55 ML/MIN/1.73M2 — LOW
GLUCOSE BLDC GLUCOMTR-MCNC: 109 MG/DL — HIGH (ref 70–99)
GLUCOSE BLDC GLUCOMTR-MCNC: 113 MG/DL — HIGH (ref 70–99)
GLUCOSE BLDC GLUCOMTR-MCNC: 124 MG/DL — HIGH (ref 70–99)
GLUCOSE BLDC GLUCOMTR-MCNC: 128 MG/DL — HIGH (ref 70–99)
GLUCOSE SERPL-MCNC: 112 MG/DL — HIGH (ref 70–99)
INR BLD: 1.3 RATIO — HIGH (ref 0.88–1.16)
MAGNESIUM SERPL-MCNC: 1.7 MG/DL — SIGNIFICANT CHANGE UP (ref 1.6–2.6)
POTASSIUM SERPL-MCNC: 3.6 MMOL/L — SIGNIFICANT CHANGE UP (ref 3.5–5.3)
POTASSIUM SERPL-SCNC: 3.6 MMOL/L — SIGNIFICANT CHANGE UP (ref 3.5–5.3)
PROTHROM AB SERPL-ACNC: 15.1 SEC — HIGH (ref 10.5–13.4)
SARS-COV-2 RNA SPEC QL NAA+PROBE: SIGNIFICANT CHANGE UP
SODIUM SERPL-SCNC: 139 MMOL/L — SIGNIFICANT CHANGE UP (ref 135–145)

## 2022-03-27 PROCEDURE — 99232 SBSQ HOSP IP/OBS MODERATE 35: CPT

## 2022-03-27 RX ORDER — POTASSIUM CHLORIDE 20 MEQ
20 PACKET (EA) ORAL ONCE
Refills: 0 | Status: DISCONTINUED | OUTPATIENT
Start: 2022-03-27 | End: 2022-03-27

## 2022-03-27 RX ORDER — MAGNESIUM SULFATE 500 MG/ML
2 VIAL (ML) INJECTION ONCE
Refills: 0 | Status: COMPLETED | OUTPATIENT
Start: 2022-03-27 | End: 2022-03-27

## 2022-03-27 RX ORDER — POTASSIUM CHLORIDE 20 MEQ
20 PACKET (EA) ORAL ONCE
Refills: 0 | Status: COMPLETED | OUTPATIENT
Start: 2022-03-27 | End: 2022-03-27

## 2022-03-27 RX ADMIN — ATORVASTATIN CALCIUM 40 MILLIGRAM(S): 80 TABLET, FILM COATED ORAL at 21:04

## 2022-03-27 RX ADMIN — Medication 3 MILLILITER(S): at 17:14

## 2022-03-27 RX ADMIN — Medication 500 MILLIGRAM(S): at 11:37

## 2022-03-27 RX ADMIN — CARVEDILOL PHOSPHATE 12.5 MILLIGRAM(S): 80 CAPSULE, EXTENDED RELEASE ORAL at 05:54

## 2022-03-27 RX ADMIN — BUDESONIDE AND FORMOTEROL FUMARATE DIHYDRATE 2 PUFF(S): 160; 4.5 AEROSOL RESPIRATORY (INHALATION) at 08:24

## 2022-03-27 RX ADMIN — Medication 20 MILLIEQUIVALENT(S): at 22:38

## 2022-03-27 RX ADMIN — CARVEDILOL PHOSPHATE 12.5 MILLIGRAM(S): 80 CAPSULE, EXTENDED RELEASE ORAL at 17:15

## 2022-03-27 RX ADMIN — Medication 3 MILLILITER(S): at 11:37

## 2022-03-27 RX ADMIN — Medication 600 MILLIGRAM(S): at 05:53

## 2022-03-27 RX ADMIN — Medication 1 TABLET(S): at 11:37

## 2022-03-27 RX ADMIN — PANTOPRAZOLE SODIUM 40 MILLIGRAM(S): 20 TABLET, DELAYED RELEASE ORAL at 07:14

## 2022-03-27 RX ADMIN — Medication 3 MILLILITER(S): at 23:29

## 2022-03-27 RX ADMIN — Medication 1300 MILLIGRAM(S): at 05:53

## 2022-03-27 RX ADMIN — Medication 1300 MILLIGRAM(S): at 15:06

## 2022-03-27 RX ADMIN — Medication 25 GRAM(S): at 17:19

## 2022-03-27 RX ADMIN — CLOPIDOGREL BISULFATE 75 MILLIGRAM(S): 75 TABLET, FILM COATED ORAL at 11:36

## 2022-03-27 RX ADMIN — Medication 3 MILLILITER(S): at 05:54

## 2022-03-27 RX ADMIN — BUDESONIDE AND FORMOTEROL FUMARATE DIHYDRATE 2 PUFF(S): 160; 4.5 AEROSOL RESPIRATORY (INHALATION) at 19:50

## 2022-03-27 RX ADMIN — Medication 4 MILLIGRAM(S): at 21:04

## 2022-03-27 RX ADMIN — Medication 1300 MILLIGRAM(S): at 21:04

## 2022-03-27 RX ADMIN — HEPARIN SODIUM 13.5 UNIT(S)/HR: 5000 INJECTION INTRAVENOUS; SUBCUTANEOUS at 11:12

## 2022-03-27 RX ADMIN — CHLORHEXIDINE GLUCONATE 1 APPLICATION(S): 213 SOLUTION TOPICAL at 06:08

## 2022-03-27 RX ADMIN — Medication 600 MILLIGRAM(S): at 17:15

## 2022-03-27 NOTE — PROGRESS NOTE ADULT - PROBLEM SELECTOR PLAN 1
[facility] completed, LM 10%, LAD 60%, Cx and RCA  with collaterals   Per cardiology high risk patient undergoing intermediate risk surgery/procedure OPTIMIZED to the lowest risk predicted by the RCRI based on co-morbidities and nature of operation/procedure.   Continue medical management with Coreg, Lipitor, Plavix

## 2022-03-27 NOTE — CHART NOTE - NSCHARTNOTEFT_GEN_A_CORE
Vascular Surgery Preop Note    Procedure: Left above knee amputation  Surgeon: Dr. Sterling  Date: 3/28/22    Vital Signs Last 24 Hrs  T(C): 36.5 (27 Mar 2022 04:22), Max: 37.1 (26 Mar 2022 20:15)  T(F): 97.7 (27 Mar 2022 04:22), Max: 98.8 (26 Mar 2022 20:15)  HR: 72 (27 Mar 2022 04:22) (71 - 77)  BP: 128/71 (27 Mar 2022 04:22) (126/66 - 130/70)  BP(mean): --  RR: 16 (27 Mar 2022 04:22) (16 - 18)  SpO2: 96% (27 Mar 2022 04:22) (96% - 98%)    LABS:                        9.0    9.21  )-----------( 235      ( 26 Mar 2022 06:32 )             26.9     03-26    140  |  109<H>  |  15  ----------------------------<  109<H>  3.3<L>   |  19<L>  |  1.42<H>    Ca    8.7      26 Mar 2022 06:32  Mg     1.8     03-26      PTT - ( 26 Mar 2022 07:20 )  PTT:66.2 sec      INs and OUTs:    03-26-22 @ 07:01  -  03-27-22 @ 07:00  --------------------------------------------------------  IN: 480 mL / OUT: 1 mL / NET: 479 mL    76M w/ hx of HTN, HLD, PAD, right AKA, transferred from [facility] for cardiac cath after he underwent a LLE angiogram and had ST elevation during the procedure. Since transfer patient underwent diagnostic cardiac catheterization showing severe CAD in the LCx and RCA. Following discussion of GOC decision made to undergo amputation.     Recommendation   - Plan for OR for L AKA with Dr. Sterling tomorrow 3/28  - Risk optimization documentation noted; high risk with highest possible optimization; cleared for surgery with current risk  - Appreciate pre-anesthesia evaluation  - Preoperative workup:     Obtain Covid swab today 3/27      NPO at midnight tonight      Obtain CBC, BMP, Mg, Phos, Type and screen, coags at 4AM      Please ensure 2U pRBC on hold for OR     Documented optimized/cleared for OR by primary team     No HD prior to surgery (can be postop) Vascular Surgery Preop Note    Procedure: Left above knee amputation  Surgeon: Dr. Sterling  Date: 3/28/22    Vital Signs Last 24 Hrs  T(C): 36.5 (27 Mar 2022 04:22), Max: 37.1 (26 Mar 2022 20:15)  T(F): 97.7 (27 Mar 2022 04:22), Max: 98.8 (26 Mar 2022 20:15)  HR: 72 (27 Mar 2022 04:22) (71 - 77)  BP: 128/71 (27 Mar 2022 04:22) (126/66 - 130/70)  BP(mean): --  RR: 16 (27 Mar 2022 04:22) (16 - 18)  SpO2: 96% (27 Mar 2022 04:22) (96% - 98%)    LABS:                        9.0    9.21  )-----------( 235      ( 26 Mar 2022 06:32 )             26.9     03-26    140  |  109<H>  |  15  ----------------------------<  109<H>  3.3<L>   |  19<L>  |  1.42<H>    Ca    8.7      26 Mar 2022 06:32  Mg     1.8     03-26      PTT - ( 26 Mar 2022 07:20 )  PTT:66.2 sec      INs and OUTs:    03-26-22 @ 07:01  -  03-27-22 @ 07:00  --------------------------------------------------------  IN: 480 mL / OUT: 1 mL / NET: 479 mL    76M w/ hx of HTN, HLD, PAD, right AKA, transferred from [facility] for cardiac cath after he underwent a LLE angiogram and had ST elevation during the procedure. Since transfer patient underwent diagnostic cardiac catheterization showing severe CAD in the LCx and RCA. Following discussion of GOC decision made to undergo amputation.     Recommendation   - Plan for OR for L AKA with Dr. Sterling tomorrow 3/28  - Risk optimization documentation noted; high risk with highest possible optimization; cleared for surgery with current risk  - Appreciate pre-anesthesia evaluation  - Preoperative workup:     Obtain Covid swab today 3/27      NPO at midnight tonight      Obtain CBC, BMP, Mg, Phos, Type and screen, coags at 4AM      Please ensure 2U pRBC on hold for OR     Documented optimized/cleared for OR by primary team     No HD prior to surgery (can be postop)  - Heparin gtt off at midnight

## 2022-03-27 NOTE — PROGRESS NOTE ADULT - PROBLEM SELECTOR PLAN 3
Baseline unknown but improved  Renal sono with decreased echogenicty c/w parenchymal disease  Continue Sodium Bicarb  Hypernatremia- resolved- encourage free water intake  Mild hypokalemia repleted today  Hypomag repleted today

## 2022-03-27 NOTE — PROGRESS NOTE ADULT - ASSESSMENT
76M w/ pmh of HTN, HLD, PVD s/p R AKA who was brought by EMS to [facility] after a mechanical fall from his wheelchair found to have CHF exacerbation, PNA as well as acute LLE arterial occlusions w/ unsuccessful angiogram. Stress test at OSH was positive, transferred for angiogram for cardiac clearance for possible LLE BKA

## 2022-03-27 NOTE — PROGRESS NOTE ADULT - SUBJECTIVE AND OBJECTIVE BOX
[facility] Division of Hospital Medicine  Jessica Vigil DO pager [phone]    Patient is a 76y old  Male who presents with a chief complaint of Cardiac eval for ischemic limb (26 Mar 2022 16:40)      SUBJECTIVE / OVERNIGHT EVENTS:  No acute overnight events.   No CP, SOB, dizziness, pain, fever, chills.       MEDICATIONS  (STANDING):  albuterol/ipratropium for Nebulization 3 milliLiter(s) Nebulizer every 6 hours  ascorbic acid 500 milliGRAM(s) Oral daily  atorvastatin 40 milliGRAM(s) Oral at bedtime  budesonide 160 MICROgram(s)/formoterol 4.5 MICROgram(s) Inhaler 2 Puff(s) Inhalation two times a day  carvedilol 12.5 milliGRAM(s) Oral every 12 hours  chlorhexidine 2% Cloths 1 Application(s) Topical <User Schedule>  chlorhexidine 4% Liquid 1 Application(s) Topical <User Schedule>  clopidogrel Tablet 75 milliGRAM(s) Oral daily  doxazosin 4 milliGRAM(s) Oral at bedtime  guaiFENesin  milliGRAM(s) Oral every 12 hours  heparin  Infusion 1400 Unit(s)/Hr (13.5 mL/Hr) IV Continuous <Continuous>  insulin lispro (ADMELOG) corrective regimen sliding scale   SubCutaneous three times a day before meals  Nephro-carlton 1 Tablet(s) Oral daily  pantoprazole    Tablet 40 milliGRAM(s) Oral before breakfast  sodium bicarbonate 1300 milliGRAM(s) Oral three times a day    MEDICATIONS  (PRN):  acetaminophen     Tablet .. 650 milliGRAM(s) Oral every 6 hours PRN Moderate Pain (4 - 6)      CAPILLARY BLOOD GLUCOSE      POCT Blood Glucose.: 113 mg/dL (27 Mar 2022 11:39)  POCT Blood Glucose.: 124 mg/dL (27 Mar 2022 08:11)  POCT Blood Glucose.: 116 mg/dL (26 Mar 2022 16:50)    I&O's Summary    26 Mar 2022 07:01  -  27 Mar 2022 07:00  --------------------------------------------------------  IN: 480 mL / OUT: 1 mL / NET: 479 mL    27 Mar 2022 07:01  -  27 Mar 2022 16:19  --------------------------------------------------------  IN: 120 mL / OUT: 0 mL / NET: 120 mL        PHYSICAL EXAM:  Vital Signs Last 24 Hrs  T(C): 36.9 (27 Mar 2022 11:27), Max: 37.1 (26 Mar 2022 20:15)  T(F): 98.5 (27 Mar 2022 11:27), Max: 98.8 (26 Mar 2022 20:15)  HR: 74 (27 Mar 2022 11:27) (71 - 74)  BP: 125/67 (27 Mar 2022 11:27) (125/67 - 128/71)  BP(mean): --  RR: 18 (27 Mar 2022 11:27) (16 - 18)  SpO2: 97% (27 Mar 2022 11:27) (96% - 97%)    CONSTITUTIONAL: NAD, well-developed  ENMT: Moist oral mucosa  RESPIRATORY: Normal respiratory effort; lungs are clear to auscultation bilaterally  CARDIOVASCULAR: Regular rate and rhythm, normal S1 and S2  ABDOMEN: Nontender to palpation, normoactive bowel sounds, no rebound/guarding  MUSCULOSKELETAL: no clubbing or cyanosis of digits; no joint swelling or tenderness to palpation, R AKA, LLE cool  PSYCH: A+O to person, place, and time; affect appropriate    LABS:                        9.0    9.21  )-----------( 235      ( 26 Mar 2022 06:32 )             26.9     03-27    139  |  107  |  13  ----------------------------<  112<H>  3.6   |  20<L>  |  1.35<H>    Ca    8.8      27 Mar 2022 10:01  Mg     1.7     03-27      PT/INR - ( 27 Mar 2022 10:01 )   PT: 15.1 sec;   INR: 1.30 ratio         PTT - ( 27 Mar 2022 10:01 )  PTT:84.2 sec            RADIOLOGY & ADDITIONAL TESTS:  Results Reviewed:   Imaging Personally Reviewed:  Electrocardiogram Personally Reviewed:    COORDINATION OF CARE:  Care Discussed with Consultants/Other Providers [Y/N]:  Prior or Outpatient Records Reviewed [Y/N]:

## 2022-03-28 LAB
ANION GAP SERPL CALC-SCNC: 14 MMOL/L — SIGNIFICANT CHANGE UP (ref 5–17)
ANION GAP SERPL CALC-SCNC: 14 MMOL/L — SIGNIFICANT CHANGE UP (ref 5–17)
APTT BLD: 27 SEC — LOW (ref 27.5–35.5)
BLD GP AB SCN SERPL QL: NEGATIVE — SIGNIFICANT CHANGE UP
BUN SERPL-MCNC: 13 MG/DL — SIGNIFICANT CHANGE UP (ref 7–23)
BUN SERPL-MCNC: 13 MG/DL — SIGNIFICANT CHANGE UP (ref 7–23)
C DIFF GDH STL QL: NEGATIVE — SIGNIFICANT CHANGE UP
C DIFF GDH STL QL: SIGNIFICANT CHANGE UP
CALCIUM SERPL-MCNC: 8.6 MG/DL — SIGNIFICANT CHANGE UP (ref 8.4–10.5)
CALCIUM SERPL-MCNC: 8.9 MG/DL — SIGNIFICANT CHANGE UP (ref 8.4–10.5)
CHLORIDE SERPL-SCNC: 104 MMOL/L — SIGNIFICANT CHANGE UP (ref 96–108)
CHLORIDE SERPL-SCNC: 110 MMOL/L — HIGH (ref 96–108)
CO2 SERPL-SCNC: 17 MMOL/L — LOW (ref 22–31)
CO2 SERPL-SCNC: 18 MMOL/L — LOW (ref 22–31)
CREAT SERPL-MCNC: 1.14 MG/DL — SIGNIFICANT CHANGE UP (ref 0.5–1.3)
CREAT SERPL-MCNC: 1.33 MG/DL — HIGH (ref 0.5–1.3)
EGFR: 56 ML/MIN/1.73M2 — LOW
EGFR: 67 ML/MIN/1.73M2 — SIGNIFICANT CHANGE UP
GLUCOSE BLDC GLUCOMTR-MCNC: 103 MG/DL — HIGH (ref 70–99)
GLUCOSE BLDC GLUCOMTR-MCNC: 103 MG/DL — HIGH (ref 70–99)
GLUCOSE BLDC GLUCOMTR-MCNC: 111 MG/DL — HIGH (ref 70–99)
GLUCOSE BLDC GLUCOMTR-MCNC: 129 MG/DL — HIGH (ref 70–99)
GLUCOSE SERPL-MCNC: 109 MG/DL — HIGH (ref 70–99)
GLUCOSE SERPL-MCNC: 124 MG/DL — HIGH (ref 70–99)
HCT VFR BLD CALC: 26 % — LOW (ref 39–50)
HGB BLD-MCNC: 8.6 G/DL — LOW (ref 13–17)
INR BLD: 1.32 RATIO — HIGH (ref 0.88–1.16)
MAGNESIUM SERPL-MCNC: 1.8 MG/DL — SIGNIFICANT CHANGE UP (ref 1.6–2.6)
MCHC RBC-ENTMCNC: 25.8 PG — LOW (ref 27–34)
MCHC RBC-ENTMCNC: 33.1 GM/DL — SIGNIFICANT CHANGE UP (ref 32–36)
MCV RBC AUTO: 78.1 FL — LOW (ref 80–100)
NRBC # BLD: 0 /100 WBCS — SIGNIFICANT CHANGE UP (ref 0–0)
PHOSPHATE SERPL-MCNC: 2.9 MG/DL — SIGNIFICANT CHANGE UP (ref 2.5–4.5)
PLATELET # BLD AUTO: 222 K/UL — SIGNIFICANT CHANGE UP (ref 150–400)
POTASSIUM SERPL-MCNC: 3 MMOL/L — LOW (ref 3.5–5.3)
POTASSIUM SERPL-MCNC: 3.2 MMOL/L — LOW (ref 3.5–5.3)
POTASSIUM SERPL-SCNC: 3 MMOL/L — LOW (ref 3.5–5.3)
POTASSIUM SERPL-SCNC: 3.2 MMOL/L — LOW (ref 3.5–5.3)
PROTHROM AB SERPL-ACNC: 15.3 SEC — HIGH (ref 10.5–13.4)
RBC # BLD: 3.33 M/UL — LOW (ref 4.2–5.8)
RBC # FLD: 16.7 % — HIGH (ref 10.3–14.5)
RH IG SCN BLD-IMP: POSITIVE — SIGNIFICANT CHANGE UP
SODIUM SERPL-SCNC: 135 MMOL/L — SIGNIFICANT CHANGE UP (ref 135–145)
SODIUM SERPL-SCNC: 142 MMOL/L — SIGNIFICANT CHANGE UP (ref 135–145)
WBC # BLD: 7.36 K/UL — SIGNIFICANT CHANGE UP (ref 3.8–10.5)
WBC # FLD AUTO: 7.36 K/UL — SIGNIFICANT CHANGE UP (ref 3.8–10.5)

## 2022-03-28 PROCEDURE — 88307 TISSUE EXAM BY PATHOLOGIST: CPT | Mod: 26

## 2022-03-28 PROCEDURE — 99233 SBSQ HOSP IP/OBS HIGH 50: CPT

## 2022-03-28 RX ORDER — POTASSIUM CHLORIDE 20 MEQ
10 PACKET (EA) ORAL
Refills: 0 | Status: COMPLETED | OUTPATIENT
Start: 2022-03-28 | End: 2022-03-28

## 2022-03-28 RX ORDER — HYDROMORPHONE HYDROCHLORIDE 2 MG/ML
0.5 INJECTION INTRAMUSCULAR; INTRAVENOUS; SUBCUTANEOUS
Refills: 0 | Status: DISCONTINUED | OUTPATIENT
Start: 2022-03-28 | End: 2022-03-28

## 2022-03-28 RX ORDER — SODIUM CHLORIDE 9 MG/ML
1000 INJECTION, SOLUTION INTRAVENOUS
Refills: 0 | Status: DISCONTINUED | OUTPATIENT
Start: 2022-03-28 | End: 2022-03-28

## 2022-03-28 RX ADMIN — Medication 100 MILLIEQUIVALENT(S): at 08:58

## 2022-03-28 RX ADMIN — CHLORHEXIDINE GLUCONATE 1 APPLICATION(S): 213 SOLUTION TOPICAL at 12:21

## 2022-03-28 RX ADMIN — BUDESONIDE AND FORMOTEROL FUMARATE DIHYDRATE 2 PUFF(S): 160; 4.5 AEROSOL RESPIRATORY (INHALATION) at 19:26

## 2022-03-28 RX ADMIN — Medication 100 MILLIEQUIVALENT(S): at 11:00

## 2022-03-28 RX ADMIN — SODIUM CHLORIDE 75 MILLILITER(S): 9 INJECTION, SOLUTION INTRAVENOUS at 17:21

## 2022-03-28 RX ADMIN — BUDESONIDE AND FORMOTEROL FUMARATE DIHYDRATE 2 PUFF(S): 160; 4.5 AEROSOL RESPIRATORY (INHALATION) at 08:16

## 2022-03-28 RX ADMIN — Medication 600 MILLIGRAM(S): at 21:16

## 2022-03-28 RX ADMIN — Medication 1300 MILLIGRAM(S): at 05:03

## 2022-03-28 RX ADMIN — Medication 4 MILLIGRAM(S): at 21:16

## 2022-03-28 RX ADMIN — Medication 3 MILLILITER(S): at 05:04

## 2022-03-28 RX ADMIN — Medication 100 MILLIEQUIVALENT(S): at 10:00

## 2022-03-28 RX ADMIN — CARVEDILOL PHOSPHATE 12.5 MILLIGRAM(S): 80 CAPSULE, EXTENDED RELEASE ORAL at 17:21

## 2022-03-28 RX ADMIN — CARVEDILOL PHOSPHATE 12.5 MILLIGRAM(S): 80 CAPSULE, EXTENDED RELEASE ORAL at 05:03

## 2022-03-28 RX ADMIN — ATORVASTATIN CALCIUM 40 MILLIGRAM(S): 80 TABLET, FILM COATED ORAL at 21:16

## 2022-03-28 RX ADMIN — CLOPIDOGREL BISULFATE 75 MILLIGRAM(S): 75 TABLET, FILM COATED ORAL at 21:16

## 2022-03-28 RX ADMIN — Medication 3 MILLILITER(S): at 17:21

## 2022-03-28 RX ADMIN — Medication 1300 MILLIGRAM(S): at 21:16

## 2022-03-28 NOTE — CHART NOTE - NSCHARTNOTEFT_GEN_A_CORE
POST-OPERATIVE NOTE    Subjective:  Patient is s/p left above knee amputation. Recovering appropriately. States pain is controlled.     Vital Signs Last 24 Hrs  T(C): 36.2 (28 Mar 2022 19:00), Max: 36.8 (27 Mar 2022 20:39)  T(F): 97.2 (28 Mar 2022 19:00), Max: 98.3 (27 Mar 2022 20:39)  HR: 79 (28 Mar 2022 19:15) (74 - 86)  BP: 131/61 (28 Mar 2022 19:00) (124/65 - 145/72)  BP(mean): 85 (28 Mar 2022 19:00) (85 - 104)  RR: 17 (28 Mar 2022 19:15) (14 - 19)  SpO2: 99% (28 Mar 2022 19:15) (95% - 100%)  I&O's Detail    27 Mar 2022 07:01  -  28 Mar 2022 07:00  --------------------------------------------------------  IN:    Heparin: 67.5 mL    Oral Fluid: 360 mL  Total IN: 427.5 mL    OUT:  Total OUT: 0 mL    Total NET: 427.5 mL      28 Mar 2022 07:01  -  28 Mar 2022 19:19  --------------------------------------------------------  IN:    Lactated Ringers: 225 mL  Total IN: 225 mL    OUT:  Total OUT: 0 mL    Total NET: 225 mL        carvedilol 12.5  clopidogrel Tablet 75  doxazosin 4    PAST MEDICAL & SURGICAL HISTORY:  HTN (hypertension)    Hyperlipidemia    PVD (peripheral vascular disease)    S/P BKA (below knee amputation), right          Physical Exam:  General: NAD, resting comfortably in bed  Pulmonary: Nonlabored breathing, no respiratory distress  Extremities: right aka intact, left aka with acewrap and overlying ioband, no strikethrough       LABS:                        8.6    7.36  )-----------( 222      ( 28 Mar 2022 03:44 )             26.0     03-28    135  |  104  |  13  ----------------------------<  109<H>  3.0<L>   |  17<L>  |  1.33<H>    Ca    8.9      28 Mar 2022 03:44  Phos  2.9     03-28  Mg     1.8     03-28      PT/INR - ( 28 Mar 2022 03:44 )   PT: 15.3 sec;   INR: 1.32 ratio         PTT - ( 28 Mar 2022 03:44 )  PTT:27.0 sec  CAPILLARY BLOOD GLUCOSE      POCT Blood Glucose.: 103 mg/dL (28 Mar 2022 17:59)  POCT Blood Glucose.: 103 mg/dL (28 Mar 2022 11:29)  POCT Blood Glucose.: 111 mg/dL (28 Mar 2022 07:59)  POCT Blood Glucose.: 128 mg/dL (27 Mar 2022 21:38)      Radiology and Additional Studies:    Assessment:  The patient is a 76y Male who is now several hours post-op from a left AKA     Plan:  - Pain control as needed  - DVT ppx  - F/u AM labs  - care per primary team     Vascular Surgery   [id number]

## 2022-03-28 NOTE — PRE-ANESTHESIA EVALUATION ADULT - NSANTHPMHFT_GEN_ALL_CORE
75 yo M with pmh of HTN, HLD, CKD, CHF (EF 35%), and PVD s/p R AKA who was transferred from OSH for cardiac evaluation s/p ST changes during unsuccessful angiogram/ angioplasty. LHC showing MVD that is being management medically (collaterals to LCx and RCA). Patient has been seen and evaluated by cardiology- deemed optimized for planned AKA. Patient followed by nephrology- improved Cr and electrolyte imbalances. Patient currently denies any chest pain, shortness of breath, cough, and/or new symptoms.
75 yo M with pmh of HTN, HLD, CKD, CHF (EF 35%), and PVD s/p R AKA who was transferred from OSH for cardiac evaluation s/p ST changes during unsuccessful angiogram/ angioplasty. LHC showing MVD that is being management medically (collaterals to LCx and RCA). Patient has been seen and evaluated by cardiology- deemed optimized for planned AKA. Patient followed by nephrology- improved Cr and electrolyte imbalances. Patient currently denies any chest pain, shortness of breath, cough, and/or new symptoms.

## 2022-03-28 NOTE — BRIEF OPERATIVE NOTE - OPERATION/FINDINGS
Left above knee amputation, femoral artery and vein controlled with silk ties, femur divided with oscillating saw, sciatic nerve secured with silk tie. Anterior and posterior flaps closed over bone, skin stapled. Hemostatic at end of case.

## 2022-03-28 NOTE — PROGRESS NOTE ADULT - PROBLEM SELECTOR PLAN 3
Baseline unknown but improved  Renal sono with decreased echogenicty c/w parenchymal disease  Continue Sodium Bicarb  Hypernatremia- resolved- encourage free water intake  Hypokalemia repleted prior to OR

## 2022-03-28 NOTE — PRE-ANESTHESIA EVALUATION ADULT - NSANTHOSAYNRD_GEN_A_CORE
No. JOSE screening performed.  STOP BANG Legend: 0-2 = LOW Risk; 3-4 = INTERMEDIATE Risk; 5-8 = HIGH Risk
No. JOSE screening performed.  STOP BANG Legend: 0-2 = LOW Risk; 3-4 = INTERMEDIATE Risk; 5-8 = HIGH Risk

## 2022-03-28 NOTE — PROGRESS NOTE ADULT - SUBJECTIVE AND OBJECTIVE BOX
[facility] Division of Hospital Medicine  Jessica Vigil DO pager [phone]    Patient is a 76y old  Male who presents with a chief complaint of Cardiac clearance for ischemic limb (27 Mar 2022 16:18)      SUBJECTIVE / OVERNIGHT EVENTS:  Seen prior to the OR.   No acute overnight events. No CP, SOB, dizziness, HA. Feels ready for surgery     MEDICATIONS  (STANDING):  albuterol/ipratropium for Nebulization 3 milliLiter(s) Nebulizer every 6 hours  ascorbic acid 500 milliGRAM(s) Oral daily  atorvastatin 40 milliGRAM(s) Oral at bedtime  budesonide 160 MICROgram(s)/formoterol 4.5 MICROgram(s) Inhaler 2 Puff(s) Inhalation two times a day  carvedilol 12.5 milliGRAM(s) Oral every 12 hours  chlorhexidine 2% Cloths 1 Application(s) Topical <User Schedule>  chlorhexidine 4% Liquid 1 Application(s) Topical <User Schedule>  clopidogrel Tablet 75 milliGRAM(s) Oral daily  doxazosin 4 milliGRAM(s) Oral at bedtime  guaiFENesin  milliGRAM(s) Oral every 12 hours  insulin lispro (ADMELOG) corrective regimen sliding scale   SubCutaneous three times a day before meals  Nephro-carlton 1 Tablet(s) Oral daily  pantoprazole    Tablet 40 milliGRAM(s) Oral before breakfast  sodium bicarbonate 1300 milliGRAM(s) Oral three times a day    MEDICATIONS  (PRN):  acetaminophen     Tablet .. 650 milliGRAM(s) Oral every 6 hours PRN Moderate Pain (4 - 6)      CAPILLARY BLOOD GLUCOSE      POCT Blood Glucose.: 103 mg/dL (28 Mar 2022 11:29)  POCT Blood Glucose.: 111 mg/dL (28 Mar 2022 07:59)  POCT Blood Glucose.: 128 mg/dL (27 Mar 2022 21:38)  POCT Blood Glucose.: 109 mg/dL (27 Mar 2022 16:59)    I&O's Summary    27 Mar 2022 07:01  -  28 Mar 2022 07:00  --------------------------------------------------------  IN: 427.5 mL / OUT: 0 mL / NET: 427.5 mL        PHYSICAL EXAM:  Vital Signs Last 24 Hrs  T(C): 36.5 (28 Mar 2022 13:02), Max: 36.8 (27 Mar 2022 20:39)  T(F): 97.7 (28 Mar 2022 12:39), Max: 98.3 (27 Mar 2022 20:39)  HR: 86 (28 Mar 2022 13:02) (74 - 86)  BP: 139/57 (28 Mar 2022 13:02) (124/65 - 139/57)  BP(mean): --  RR: 18 (28 Mar 2022 13:02) (18 - 19)  SpO2: 95% (28 Mar 2022 13:02) (95% - 99%)    CONSTITUTIONAL: NAD, well-developed  RESPIRATORY: Normal respiratory effort; lungs are clear to auscultation bilaterally  CARDIOVASCULAR: Regular rate and rhythm, normal S1 and S2  ABDOMEN: Nontender to palpation, normoactive bowel sounds, no rebound/guarding  MUSCULOSKELETAL: no clubbing or cyanosis of digits; no joint swelling or tenderness to palpation, R AKA, LLE blackened, cool  PSYCH: A+O to person, place, and time; affect appropriate    LABS:                        8.6    7.36  )-----------( 222      ( 28 Mar 2022 03:44 )             26.0     03-28    135  |  104  |  13  ----------------------------<  109<H>  3.0<L>   |  17<L>  |  1.33<H>    Ca    8.9      28 Mar 2022 03:44  Phos  2.9     03-28  Mg     1.8     03-28      PT/INR - ( 28 Mar 2022 03:44 )   PT: 15.3 sec;   INR: 1.32 ratio         PTT - ( 28 Mar 2022 03:44 )  PTT:27.0 sec            RADIOLOGY & ADDITIONAL TESTS:  Results Reviewed:   Imaging Personally Reviewed:  Electrocardiogram Personally Reviewed:    COORDINATION OF CARE:  Care Discussed with Consultants/Other Providers [Y/N]:  Prior or Outpatient Records Reviewed [Y/N]:

## 2022-03-28 NOTE — PROGRESS NOTE ADULT - PROBLEM SELECTOR PLAN 1
[facility] completed, LM 10%, LAD 60%, Cx and RCA  with collaterals   Per cardiology high risk patient undergoing intermediate risk surgery/procedure OPTIMIZED to the lowest risk predicted by the RCRI based on co-morbidities and nature of operation/procedure.   Continue medical management with Coreg, Lipitor, Plavix

## 2022-03-28 NOTE — PROGRESS NOTE ADULT - ASSESSMENT
76M w/ pmh of HTN, HLD, PVD s/p R CARMELINA who was brought by EMS to [facility] after a mechanical fall from his wheelchair found to have CHF exacerbation, PNA as well as acute LLE arterial occlusions w/ unsuccessful angiogram. Stress test at OSH was positive, transferred for angiogram for cardiac clearance for E AKA

## 2022-03-28 NOTE — PROGRESS NOTE ADULT - ASSESSMENT
76M w/ hx of HTN, HLD, PAD, right AKA, transferred from [facility] for cardiac cath after he underwent a LLE angiogram and had ST elevation during the procedure. Since transfer patient underwent diagnostic cardiac catheterization showing severe CAD in the LCx and RCA. Following discussion of GOC decision made to undergo amputation.     Recommendation   - Plan for OR for L AKA with Dr. Sterling; OR TODAY  - Risk optimization documentation noted; high risk with highest possible optimization; cleared for surgery with current risk  - Appreciate pre-anesthesia evaluation  - Preoperative workup: Complete     O COVID within 4  days of OR      O NPO at Midnight day before OR     O PTT and PT/INR within 72 hours of OR (collect one day prior to OR)     O CBC, BMP, Mg, Phos drawn at 4AM to provide time for correction if needed (STAT activatable)     O Type and Screen X2 (One within 72 hours of OR and at least one other this admission)     O 2u of pRBC on hold for OR     O Documented optimized/cleared for OR by primary team     O No HD prior to surgery (can be postop)   - Will follow with you     Enoch Sparks MD PGY2  Vascular Surgery Team  [phone]

## 2022-03-28 NOTE — PROGRESS NOTE ADULT - SUBJECTIVE AND OBJECTIVE BOX
VASCULAR SURGERY PROGRESS NOTE   ___________________________________________________________________    THI HASTINGS | 76y Male   [facility] 2DSU 254 D1   1945 | [id number]     LOS 17d    Attending: Ra Seo    ___________________________________________________________________      Allergies:  NKDA    OBJECTIVE:  Vitals:    T(C): 36.6 (22 @ 04:59), Max: 36.9 (22 @ 11:27)  HR: 79 (22 @ 04:59) (74 - 79)  BP: 138/70 (22 @ 04:59) (124/65 - 138/70)  RR: 19 (22 @ 04:59) (18 - 19)  SpO2: 97% (22 @ 04:59) (97% - 99%)      OUT:  Total OUT: 0 mL          Medications:  albuterol/ipratropium for Nebulization 3 milliLiter(s) Nebulizer every 6 hours  budesonide 160 MICROgram(s)/formoterol 4.5 MICROgram(s) Inhaler 2 Puff(s) Inhalation two times a day  carvedilol 12.5 milliGRAM(s) Oral every 12 hours  doxazosin 4 milliGRAM(s) Oral at bedtime  guaiFENesin  milliGRAM(s) Oral every 12 hours  pantoprazole    Tablet 40 milliGRAM(s) Oral before breakfast          Laboratory:  WBC: 7.36 H&H: 8.6/26.0 Plt: 222  WBC: 9.21 H&H: 9.0/26.9 Plt: 235    Chemistry:                               Phos: 2.9 M.8  135  |  104  |  13  ----------------------------<  109  3.0   |  17  |  1.33        ,                              Phos: xx M.7  139  |  107  |  13  ----------------------------<  112  3.6   |  20  |  1.35          PTT 27.0 PT/INR 15.3/1.32          Reviewed laboratory and imaging    clopidogrel Tablet 75 Oral daily      COVID-19 PCR: Suha (27 Mar 2022 06:56)

## 2022-03-29 LAB
ANION GAP SERPL CALC-SCNC: 17 MMOL/L — SIGNIFICANT CHANGE UP (ref 5–17)
BUN SERPL-MCNC: 14 MG/DL — SIGNIFICANT CHANGE UP (ref 7–23)
CALCIUM SERPL-MCNC: 8.7 MG/DL — SIGNIFICANT CHANGE UP (ref 8.4–10.5)
CHLORIDE SERPL-SCNC: 112 MMOL/L — HIGH (ref 96–108)
CO2 SERPL-SCNC: 17 MMOL/L — LOW (ref 22–31)
CREAT SERPL-MCNC: 1.14 MG/DL — SIGNIFICANT CHANGE UP (ref 0.5–1.3)
EGFR: 67 ML/MIN/1.73M2 — SIGNIFICANT CHANGE UP
GLUCOSE BLDC GLUCOMTR-MCNC: 106 MG/DL — HIGH (ref 70–99)
GLUCOSE BLDC GLUCOMTR-MCNC: 111 MG/DL — HIGH (ref 70–99)
GLUCOSE BLDC GLUCOMTR-MCNC: 112 MG/DL — HIGH (ref 70–99)
GLUCOSE BLDC GLUCOMTR-MCNC: 116 MG/DL — HIGH (ref 70–99)
GLUCOSE SERPL-MCNC: 107 MG/DL — HIGH (ref 70–99)
HCT VFR BLD CALC: 28.6 % — LOW (ref 39–50)
HGB BLD-MCNC: 9.5 G/DL — LOW (ref 13–17)
MAGNESIUM SERPL-MCNC: 1.7 MG/DL — SIGNIFICANT CHANGE UP (ref 1.6–2.6)
MCHC RBC-ENTMCNC: 26 PG — LOW (ref 27–34)
MCHC RBC-ENTMCNC: 33.2 GM/DL — SIGNIFICANT CHANGE UP (ref 32–36)
MCV RBC AUTO: 78.4 FL — LOW (ref 80–100)
NRBC # BLD: 0 /100 WBCS — SIGNIFICANT CHANGE UP (ref 0–0)
PLATELET # BLD AUTO: 218 K/UL — SIGNIFICANT CHANGE UP (ref 150–400)
POTASSIUM SERPL-MCNC: 3.3 MMOL/L — LOW (ref 3.5–5.3)
POTASSIUM SERPL-SCNC: 3.3 MMOL/L — LOW (ref 3.5–5.3)
RBC # BLD: 3.65 M/UL — LOW (ref 4.2–5.8)
RBC # FLD: 17.3 % — HIGH (ref 10.3–14.5)
SODIUM SERPL-SCNC: 146 MMOL/L — HIGH (ref 135–145)
WBC # BLD: 10.84 K/UL — HIGH (ref 3.8–10.5)
WBC # FLD AUTO: 10.84 K/UL — HIGH (ref 3.8–10.5)

## 2022-03-29 PROCEDURE — 99232 SBSQ HOSP IP/OBS MODERATE 35: CPT

## 2022-03-29 RX ORDER — POTASSIUM CHLORIDE 20 MEQ
40 PACKET (EA) ORAL EVERY 4 HOURS
Refills: 0 | Status: COMPLETED | OUTPATIENT
Start: 2022-03-29 | End: 2022-03-29

## 2022-03-29 RX ORDER — OXYCODONE HYDROCHLORIDE 5 MG/1
5 TABLET ORAL EVERY 4 HOURS
Refills: 0 | Status: DISCONTINUED | OUTPATIENT
Start: 2022-03-29 | End: 2022-03-29

## 2022-03-29 RX ORDER — POTASSIUM CHLORIDE 20 MEQ
20 PACKET (EA) ORAL
Refills: 0 | Status: DISCONTINUED | OUTPATIENT
Start: 2022-03-29 | End: 2022-03-29

## 2022-03-29 RX ORDER — MAGNESIUM SULFATE 500 MG/ML
2 VIAL (ML) INJECTION ONCE
Refills: 0 | Status: COMPLETED | OUTPATIENT
Start: 2022-03-29 | End: 2022-03-29

## 2022-03-29 RX ORDER — SENNA PLUS 8.6 MG/1
2 TABLET ORAL AT BEDTIME
Refills: 0 | Status: DISCONTINUED | OUTPATIENT
Start: 2022-03-29 | End: 2022-04-01

## 2022-03-29 RX ORDER — POLYETHYLENE GLYCOL 3350 17 G/17G
17 POWDER, FOR SOLUTION ORAL DAILY
Refills: 0 | Status: DISCONTINUED | OUTPATIENT
Start: 2022-03-29 | End: 2022-04-01

## 2022-03-29 RX ORDER — OXYCODONE HYDROCHLORIDE 5 MG/1
5 TABLET ORAL EVERY 4 HOURS
Refills: 0 | Status: DISCONTINUED | OUTPATIENT
Start: 2022-03-29 | End: 2022-04-01

## 2022-03-29 RX ORDER — POTASSIUM CHLORIDE 20 MEQ
20 PACKET (EA) ORAL
Refills: 0 | Status: COMPLETED | OUTPATIENT
Start: 2022-03-29 | End: 2022-03-29

## 2022-03-29 RX ORDER — PANTOPRAZOLE SODIUM 20 MG/1
40 TABLET, DELAYED RELEASE ORAL
Refills: 0 | Status: DISCONTINUED | OUTPATIENT
Start: 2022-03-29 | End: 2022-04-01

## 2022-03-29 RX ADMIN — OXYCODONE HYDROCHLORIDE 5 MILLIGRAM(S): 5 TABLET ORAL at 17:42

## 2022-03-29 RX ADMIN — CLOPIDOGREL BISULFATE 75 MILLIGRAM(S): 75 TABLET, FILM COATED ORAL at 12:47

## 2022-03-29 RX ADMIN — OXYCODONE HYDROCHLORIDE 5 MILLIGRAM(S): 5 TABLET ORAL at 22:24

## 2022-03-29 RX ADMIN — ATORVASTATIN CALCIUM 40 MILLIGRAM(S): 80 TABLET, FILM COATED ORAL at 21:54

## 2022-03-29 RX ADMIN — Medication 1300 MILLIGRAM(S): at 06:01

## 2022-03-29 RX ADMIN — Medication 40 MILLIEQUIVALENT(S): at 06:01

## 2022-03-29 RX ADMIN — CHLORHEXIDINE GLUCONATE 1 APPLICATION(S): 213 SOLUTION TOPICAL at 08:47

## 2022-03-29 RX ADMIN — Medication 1300 MILLIGRAM(S): at 12:47

## 2022-03-29 RX ADMIN — Medication 50 MILLIEQUIVALENT(S): at 19:23

## 2022-03-29 RX ADMIN — Medication 3 MILLILITER(S): at 12:47

## 2022-03-29 RX ADMIN — PANTOPRAZOLE SODIUM 40 MILLIGRAM(S): 20 TABLET, DELAYED RELEASE ORAL at 06:19

## 2022-03-29 RX ADMIN — OXYCODONE HYDROCHLORIDE 5 MILLIGRAM(S): 5 TABLET ORAL at 17:12

## 2022-03-29 RX ADMIN — Medication 25 GRAM(S): at 12:45

## 2022-03-29 RX ADMIN — Medication 40 MILLIEQUIVALENT(S): at 03:08

## 2022-03-29 RX ADMIN — OXYCODONE HYDROCHLORIDE 5 MILLIGRAM(S): 5 TABLET ORAL at 21:54

## 2022-03-29 RX ADMIN — Medication 4 MILLIGRAM(S): at 21:53

## 2022-03-29 RX ADMIN — Medication 50 MILLIEQUIVALENT(S): at 15:07

## 2022-03-29 RX ADMIN — CARVEDILOL PHOSPHATE 12.5 MILLIGRAM(S): 80 CAPSULE, EXTENDED RELEASE ORAL at 17:10

## 2022-03-29 RX ADMIN — BUDESONIDE AND FORMOTEROL FUMARATE DIHYDRATE 2 PUFF(S): 160; 4.5 AEROSOL RESPIRATORY (INHALATION) at 21:51

## 2022-03-29 RX ADMIN — Medication 500 MILLIGRAM(S): at 12:46

## 2022-03-29 RX ADMIN — Medication 3 MILLILITER(S): at 06:01

## 2022-03-29 RX ADMIN — OXYCODONE HYDROCHLORIDE 5 MILLIGRAM(S): 5 TABLET ORAL at 09:30

## 2022-03-29 RX ADMIN — Medication 1 TABLET(S): at 12:46

## 2022-03-29 RX ADMIN — OXYCODONE HYDROCHLORIDE 5 MILLIGRAM(S): 5 TABLET ORAL at 08:58

## 2022-03-29 RX ADMIN — Medication 1300 MILLIGRAM(S): at 21:53

## 2022-03-29 RX ADMIN — CARVEDILOL PHOSPHATE 12.5 MILLIGRAM(S): 80 CAPSULE, EXTENDED RELEASE ORAL at 06:01

## 2022-03-29 RX ADMIN — Medication 50 MILLIEQUIVALENT(S): at 17:06

## 2022-03-29 NOTE — PROGRESS NOTE ADULT - ASSESSMENT
76M w/ hx of HTN, HLD, PAD, right AKA, transferred from [facility] for cardiac cath after he underwent a LLE angiogram and had ST elevation during the procedure. Since transfer patient underwent diagnostic cardiac catheterization showing severe CAD in the LCx and RCA. Following discussion of GOC decision made to undergo amputation.     Recommendation   - S/P L AKA with Dr. Miranda; procedure well tolerated; on exam this AM referring expected postoperative pain  - Pain medication as needed; recommend oral narcotic pain medication with IV breakthrough medication; around the clock Tylenol  - Maintain dressing; page vascular if saturated or compromised  - Appreciate rest of care per primary team  - Will follow with you    Enoch Sparks MD PGY2  Vascular Surgery Team  [phone]

## 2022-03-29 NOTE — PROGRESS NOTE ADULT - ASSESSMENT
76M w/ pmh of HTN, HLD, PVD s/p R CARMELINA who was brought by EMS to [facility] after a mechanical fall from his wheelchair found to have CHF exacerbation, PNA as well as acute LLE arterial occlusions w/ unsuccessful angiogram. Stress test at OSH was positive, transferred for angiogram for cardiac clearance for LLE AKA.

## 2022-03-29 NOTE — PROGRESS NOTE ADULT - SUBJECTIVE AND OBJECTIVE BOX
VASCULAR SURGERY PROGRESS NOTE   ___________________________________________________________________    THI HASTINGS | 76y Male   [facility] 2DSU 254 D1   1945 | [id number]     LOS 18d    Attending: Jessica Vigil    ___________________________________________________________________      SUBJECTIVE:   Patient seen today during morning rounds at bedside and found to be without acute distress. Denies chest pain, fever, severe pain, or SOB.     Allergies:  NKDA    OBJECTIVE:  Vitals:  Height (cm): 154.2  Weight (kg): 71.3  BMI (kg/m2): 30  T(C): 36.6 (22 @ 04:20), Max: 36.7 (22 @ 20:57)  HR: 85 (22 @ 04:20) (75 - 86)  BP: 128/66 (22 @ 04:20) (125/62 - 145/72)  RR: 18 (22 @ 04:20) (14 - 18)  SpO2: 96% (22 @ 04:20) (95% - 100%)      OUT:  Total OUT: 0 mL          Medications:  albuterol/ipratropium for Nebulization 3 milliLiter(s) Nebulizer every 6 hours  budesonide 160 MICROgram(s)/formoterol 4.5 MICROgram(s) Inhaler 2 Puff(s) Inhalation two times a day  carvedilol 12.5 milliGRAM(s) Oral every 12 hours  doxazosin 4 milliGRAM(s) Oral at bedtime  guaiFENesin  milliGRAM(s) Oral every 12 hours  pantoprazole   Suspension 40 milliGRAM(s) Oral before breakfast          Laboratory:  WBC: 10.84 H&H: 9.5/28.6 Plt: 218  WBC: 7.36 H&H: 8.6/26.0 Plt: 222    Chemistry:                               Phos: xx Mg: xx  142  |  110  |  13  ----------------------------<  124  3.2   |  18  |  1.14        ,                              Phos: 2.9 M.8  135  |  104  |  13  ----------------------------<  109  3.0   |  17  |  1.33          PTT 27.0 PT/INR 15.3/1.32    ABG - ( 28 Mar 2022 14:49 )  pH, Arterial: 7.39  pH, Blood: x     /  pCO2: 32    /  pO2: 181   / HCO3: 19    / Base Excess: -4.9  /  SaO2: 99.5                  Reviewed laboratory and imaging    clopidogrel Tablet 75 Oral daily      COVID-19 PCR: NotDetec (27 Mar 2022 06:56)        Physical Exam:   Constitutional: resting in bed with no acute distress  Respiratory: unlabored breathing, clear respiration  Gastrointestinal: Abdomen soft, non distended, non-tender  Vascular:  LLE s/p AKA dressing CDI with Ioban dressing and ACE wrap

## 2022-03-29 NOTE — PROGRESS NOTE ADULT - PROBLEM SELECTOR PLAN 1
[facility] completed, LM 10%, LAD 60%, Cx and RCA  with collaterals   Per cardiology high risk patient undergoing intermediate risk surgery/procedure OPTIMIZED to the lowest risk predicted by the RCRI based on co-morbidities and nature of operation/procedure.   Continue medical management with Coreg, Lipitor, Plavix

## 2022-03-29 NOTE — PROGRESS NOTE ADULT - PROBLEM SELECTOR PLAN 3
Baseline unknown but improved  Renal sono with decreased echogenicty c/w parenchymal disease  Continue Sodium Bicarb  Hypokalemia and hypomg repleted

## 2022-03-29 NOTE — PROGRESS NOTE ADULT - SUBJECTIVE AND OBJECTIVE BOX
[facility] Division of Hospital Medicine  Jessica Vigil DO pager [phone]    Patient is a 76y old  Male who presents with a chief complaint of Cardiac clearnace for ischemic limb (28 Mar 2022 14:56)      SUBJECTIVE / OVERNIGHT EVENTS:  s/p amputation yesterday.  Pain well controlled with oxycodone. Otherwise no CP, SOB, dizziness, HA.     MEDICATIONS  (STANDING):  albuterol/ipratropium for Nebulization 3 milliLiter(s) Nebulizer every 6 hours  ascorbic acid 500 milliGRAM(s) Oral daily  atorvastatin 40 milliGRAM(s) Oral at bedtime  budesonide 160 MICROgram(s)/formoterol 4.5 MICROgram(s) Inhaler 2 Puff(s) Inhalation two times a day  carvedilol 12.5 milliGRAM(s) Oral every 12 hours  chlorhexidine 2% Cloths 1 Application(s) Topical <User Schedule>  chlorhexidine 4% Liquid 1 Application(s) Topical <User Schedule>  clopidogrel Tablet 75 milliGRAM(s) Oral daily  doxazosin 4 milliGRAM(s) Oral at bedtime  insulin lispro (ADMELOG) corrective regimen sliding scale   SubCutaneous three times a day before meals  Nephro-carlton 1 Tablet(s) Oral daily  pantoprazole   Suspension 40 milliGRAM(s) Oral before breakfast  potassium chloride  20 mEq/100 mL IVPB 20 milliEquivalent(s) IV Intermittent every 2 hours  senna 2 Tablet(s) Oral at bedtime  sodium bicarbonate 1300 milliGRAM(s) Oral three times a day    MEDICATIONS  (PRN):  acetaminophen     Tablet .. 650 milliGRAM(s) Oral every 6 hours PRN Moderate Pain (4 - 6)  oxyCODONE    IR 5 milliGRAM(s) Oral every 4 hours PRN Severe Pain (7 - 10)  oxyCODONE    IR 5 milliGRAM(s) Oral every 4 hours PRN Moderate Pain (4 - 6)  polyethylene glycol 3350 17 Gram(s) Oral daily PRN Constipation      CAPILLARY BLOOD GLUCOSE      POCT Blood Glucose.: 116 mg/dL (29 Mar 2022 12:37)  POCT Blood Glucose.: 106 mg/dL (29 Mar 2022 08:08)  POCT Blood Glucose.: 129 mg/dL (28 Mar 2022 21:14)  POCT Blood Glucose.: 103 mg/dL (28 Mar 2022 17:59)    I&O's Summary    28 Mar 2022 07:01  -  29 Mar 2022 07:00  --------------------------------------------------------  IN: 300 mL / OUT: 0 mL / NET: 300 mL    29 Mar 2022 07:01  -  29 Mar 2022 15:48  --------------------------------------------------------  IN: 200 mL / OUT: 0 mL / NET: 200 mL        PHYSICAL EXAM:  Vital Signs Last 24 Hrs  T(C): 36.7 (29 Mar 2022 12:03), Max: 36.7 (28 Mar 2022 20:57)  T(F): 98 (29 Mar 2022 12:03), Max: 98 (28 Mar 2022 20:57)  HR: 78 (29 Mar 2022 12:03) (78 - 85)  BP: 138/62 (29 Mar 2022 12:03) (125/62 - 145/72)  BP(mean): 98 (28 Mar 2022 19:30) (85 - 104)  RR: 18 (29 Mar 2022 12:03) (14 - 18)  SpO2: 99% (29 Mar 2022 12:03) (95% - 100%)    CONSTITUTIONAL: NAD, well-developed  RESPIRATORY: Normal respiratory effort; lungs are clear to auscultation bilaterally  CARDIOVASCULAR: Regular rate and rhythm, normal S1 and S2  ABDOMEN: Nontender to palpation, normoactive bowel sounds, no rebound/guarding  MUSCULOSKELETAL: no clubbing or cyanosis of digits; no joint swelling or tenderness to palpation, R AKA, L AKA - dressing intact  PSYCH: A+O to person, place, and time; affect appropriate    LABS:                        9.5    10.84 )-----------( 218      ( 29 Mar 2022 06:38 )             28.6     03-29    146<H>  |  112<H>  |  14  ----------------------------<  107<H>  3.3<L>   |  17<L>  |  1.14    Ca    8.7      29 Mar 2022 06:40  Phos  2.9     03-28  Mg     1.7     03-29      PT/INR - ( 28 Mar 2022 03:44 )   PT: 15.3 sec;   INR: 1.32 ratio         PTT - ( 28 Mar 2022 03:44 )  PTT:27.0 sec            RADIOLOGY & ADDITIONAL TESTS:  Results Reviewed:   Imaging Personally Reviewed:  Electrocardiogram Personally Reviewed:    COORDINATION OF CARE:  Care Discussed with Consultants/Other Providers [Y/N]:  Prior or Outpatient Records Reviewed [Y/N]:

## 2022-03-30 LAB
ANION GAP SERPL CALC-SCNC: 12 MMOL/L — SIGNIFICANT CHANGE UP (ref 5–17)
BUN SERPL-MCNC: 12 MG/DL — SIGNIFICANT CHANGE UP (ref 7–23)
CALCIUM SERPL-MCNC: 8.4 MG/DL — SIGNIFICANT CHANGE UP (ref 8.4–10.5)
CHLORIDE SERPL-SCNC: 114 MMOL/L — HIGH (ref 96–108)
CO2 SERPL-SCNC: 19 MMOL/L — LOW (ref 22–31)
CREAT SERPL-MCNC: 1.23 MG/DL — SIGNIFICANT CHANGE UP (ref 0.5–1.3)
EGFR: 62 ML/MIN/1.73M2 — SIGNIFICANT CHANGE UP
GLUCOSE BLDC GLUCOMTR-MCNC: 102 MG/DL — HIGH (ref 70–99)
GLUCOSE BLDC GLUCOMTR-MCNC: 87 MG/DL — SIGNIFICANT CHANGE UP (ref 70–99)
GLUCOSE BLDC GLUCOMTR-MCNC: 90 MG/DL — SIGNIFICANT CHANGE UP (ref 70–99)
GLUCOSE BLDC GLUCOMTR-MCNC: 96 MG/DL — SIGNIFICANT CHANGE UP (ref 70–99)
GLUCOSE BLDC GLUCOMTR-MCNC: 99 MG/DL — SIGNIFICANT CHANGE UP (ref 70–99)
GLUCOSE SERPL-MCNC: 86 MG/DL — SIGNIFICANT CHANGE UP (ref 70–99)
HCT VFR BLD CALC: 25.7 % — LOW (ref 39–50)
HGB BLD-MCNC: 8.6 G/DL — LOW (ref 13–17)
MCHC RBC-ENTMCNC: 27 PG — SIGNIFICANT CHANGE UP (ref 27–34)
MCHC RBC-ENTMCNC: 33.5 GM/DL — SIGNIFICANT CHANGE UP (ref 32–36)
MCV RBC AUTO: 80.6 FL — SIGNIFICANT CHANGE UP (ref 80–100)
NRBC # BLD: 0 /100 WBCS — SIGNIFICANT CHANGE UP (ref 0–0)
PLATELET # BLD AUTO: 200 K/UL — SIGNIFICANT CHANGE UP (ref 150–400)
POTASSIUM SERPL-MCNC: 3.8 MMOL/L — SIGNIFICANT CHANGE UP (ref 3.5–5.3)
POTASSIUM SERPL-SCNC: 3.8 MMOL/L — SIGNIFICANT CHANGE UP (ref 3.5–5.3)
RBC # BLD: 3.19 M/UL — LOW (ref 4.2–5.8)
RBC # FLD: 17.7 % — HIGH (ref 10.3–14.5)
SODIUM SERPL-SCNC: 145 MMOL/L — SIGNIFICANT CHANGE UP (ref 135–145)
WBC # BLD: 9.22 K/UL — SIGNIFICANT CHANGE UP (ref 3.8–10.5)
WBC # FLD AUTO: 9.22 K/UL — SIGNIFICANT CHANGE UP (ref 3.8–10.5)

## 2022-03-30 PROCEDURE — 99232 SBSQ HOSP IP/OBS MODERATE 35: CPT

## 2022-03-30 RX ORDER — POTASSIUM CHLORIDE 20 MEQ
20 PACKET (EA) ORAL ONCE
Refills: 0 | Status: DISCONTINUED | OUTPATIENT
Start: 2022-03-30 | End: 2022-03-30

## 2022-03-30 RX ORDER — POTASSIUM CHLORIDE 20 MEQ
20 PACKET (EA) ORAL ONCE
Refills: 0 | Status: COMPLETED | OUTPATIENT
Start: 2022-03-30 | End: 2022-03-30

## 2022-03-30 RX ORDER — HYDROMORPHONE HYDROCHLORIDE 2 MG/ML
2 INJECTION INTRAMUSCULAR; INTRAVENOUS; SUBCUTANEOUS THREE TIMES A DAY
Refills: 0 | Status: DISCONTINUED | OUTPATIENT
Start: 2022-03-30 | End: 2022-04-01

## 2022-03-30 RX ADMIN — ATORVASTATIN CALCIUM 40 MILLIGRAM(S): 80 TABLET, FILM COATED ORAL at 22:02

## 2022-03-30 RX ADMIN — Medication 1300 MILLIGRAM(S): at 05:46

## 2022-03-30 RX ADMIN — CHLORHEXIDINE GLUCONATE 1 APPLICATION(S): 213 SOLUTION TOPICAL at 08:14

## 2022-03-30 RX ADMIN — CARVEDILOL PHOSPHATE 12.5 MILLIGRAM(S): 80 CAPSULE, EXTENDED RELEASE ORAL at 17:20

## 2022-03-30 RX ADMIN — Medication 20 MILLIEQUIVALENT(S): at 12:03

## 2022-03-30 RX ADMIN — Medication 500 MILLIGRAM(S): at 12:03

## 2022-03-30 RX ADMIN — OXYCODONE HYDROCHLORIDE 5 MILLIGRAM(S): 5 TABLET ORAL at 05:46

## 2022-03-30 RX ADMIN — Medication 1 TABLET(S): at 12:03

## 2022-03-30 RX ADMIN — Medication 650 MILLIGRAM(S): at 05:47

## 2022-03-30 RX ADMIN — Medication 3 MILLILITER(S): at 12:03

## 2022-03-30 RX ADMIN — Medication 650 MILLIGRAM(S): at 06:17

## 2022-03-30 RX ADMIN — OXYCODONE HYDROCHLORIDE 5 MILLIGRAM(S): 5 TABLET ORAL at 06:17

## 2022-03-30 RX ADMIN — OXYCODONE HYDROCHLORIDE 5 MILLIGRAM(S): 5 TABLET ORAL at 12:02

## 2022-03-30 RX ADMIN — BUDESONIDE AND FORMOTEROL FUMARATE DIHYDRATE 2 PUFF(S): 160; 4.5 AEROSOL RESPIRATORY (INHALATION) at 22:01

## 2022-03-30 RX ADMIN — OXYCODONE HYDROCHLORIDE 5 MILLIGRAM(S): 5 TABLET ORAL at 13:19

## 2022-03-30 RX ADMIN — CARVEDILOL PHOSPHATE 12.5 MILLIGRAM(S): 80 CAPSULE, EXTENDED RELEASE ORAL at 05:46

## 2022-03-30 RX ADMIN — PANTOPRAZOLE SODIUM 40 MILLIGRAM(S): 20 TABLET, DELAYED RELEASE ORAL at 06:52

## 2022-03-30 RX ADMIN — Medication 3 MILLILITER(S): at 17:20

## 2022-03-30 RX ADMIN — CLOPIDOGREL BISULFATE 75 MILLIGRAM(S): 75 TABLET, FILM COATED ORAL at 12:03

## 2022-03-30 RX ADMIN — Medication 4 MILLIGRAM(S): at 22:02

## 2022-03-30 RX ADMIN — Medication 3 MILLILITER(S): at 05:46

## 2022-03-30 RX ADMIN — Medication 650 MILLIGRAM(S): at 22:03

## 2022-03-30 RX ADMIN — Medication 1300 MILLIGRAM(S): at 12:03

## 2022-03-30 RX ADMIN — SENNA PLUS 2 TABLET(S): 8.6 TABLET ORAL at 22:02

## 2022-03-30 RX ADMIN — Medication 1300 MILLIGRAM(S): at 22:02

## 2022-03-30 RX ADMIN — Medication 650 MILLIGRAM(S): at 23:03

## 2022-03-30 NOTE — PROGRESS NOTE ADULT - SUBJECTIVE AND OBJECTIVE BOX
[facility] Division of Hospital Medicine  Jessica Vigil DO pager [phone]    Patient is a 76y old  Male who presents with a chief complaint of Cardiac clearnace for ischemic limb (29 Mar 2022 15:48)      SUBJECTIVE / OVERNIGHT EVENTS:  Pain controlled at rest. Per RN having some pain with turning that oxycodone not sufficient for.   Afebrile, no SOB, CP, bleeding.     MEDICATIONS  (STANDING):  albuterol/ipratropium for Nebulization 3 milliLiter(s) Nebulizer every 6 hours  ascorbic acid 500 milliGRAM(s) Oral daily  atorvastatin 40 milliGRAM(s) Oral at bedtime  budesonide 160 MICROgram(s)/formoterol 4.5 MICROgram(s) Inhaler 2 Puff(s) Inhalation two times a day  carvedilol 12.5 milliGRAM(s) Oral every 12 hours  chlorhexidine 2% Cloths 1 Application(s) Topical <User Schedule>  chlorhexidine 4% Liquid 1 Application(s) Topical <User Schedule>  clopidogrel Tablet 75 milliGRAM(s) Oral daily  doxazosin 4 milliGRAM(s) Oral at bedtime  insulin lispro (ADMELOG) corrective regimen sliding scale   SubCutaneous three times a day before meals  Nephro-carlton 1 Tablet(s) Oral daily  pantoprazole   Suspension 40 milliGRAM(s) Oral before breakfast  senna 2 Tablet(s) Oral at bedtime  sodium bicarbonate 1300 milliGRAM(s) Oral three times a day    MEDICATIONS  (PRN):  acetaminophen     Tablet .. 650 milliGRAM(s) Oral every 6 hours PRN Moderate Pain (4 - 6)  oxyCODONE    IR 5 milliGRAM(s) Oral every 4 hours PRN Moderate Pain (4 - 6)  polyethylene glycol 3350 17 Gram(s) Oral daily PRN Constipation      CAPILLARY BLOOD GLUCOSE      POCT Blood Glucose.: 96 mg/dL (30 Mar 2022 11:58)  POCT Blood Glucose.: 102 mg/dL (30 Mar 2022 08:09)  POCT Blood Glucose.: 111 mg/dL (29 Mar 2022 21:24)  POCT Blood Glucose.: 112 mg/dL (29 Mar 2022 16:26)    I&O's Summary    29 Mar 2022 07:01  -  30 Mar 2022 07:00  --------------------------------------------------------  IN: 500 mL / OUT: 1 mL / NET: 499 mL        PHYSICAL EXAM:  Vital Signs Last 24 Hrs  T(C): 36.8 (30 Mar 2022 11:46), Max: 36.8 (29 Mar 2022 20:38)  T(F): 98.3 (30 Mar 2022 11:46), Max: 98.3 (29 Mar 2022 20:38)  HR: 78 (30 Mar 2022 11:46) (68 - 78)  BP: 126/65 (30 Mar 2022 11:46) (126/65 - 141/72)  BP(mean): --  RR: 16 (30 Mar 2022 11:46) (13 - 17)  SpO2: 97% (30 Mar 2022 11:46) (96% - 97%)    CONSTITUTIONAL: NAD, well-developed  RESPIRATORY: Normal respiratory effort; lungs are clear to auscultation bilaterally  CARDIOVASCULAR: Regular rate and rhythm, normal S1 and S2  ABDOMEN: Nontender to palpation, normoactive bowel sounds, no rebound/guarding  MUSCULOSKELETAL: no clubbing or cyanosis of digits; no joint swelling or tenderness to palpation, R AKA, L AKA - dressing intact  PSYCH: A+O to person, place, and time; affect appropriate    LABS:                        8.6    9.22  )-----------( 200      ( 30 Mar 2022 06:38 )             25.7     03-30    145  |  114<H>  |  12  ----------------------------<  86  3.8   |  19<L>  |  1.23    Ca    8.4      30 Mar 2022 06:38  Mg     1.7     03-29                  RADIOLOGY & ADDITIONAL TESTS:  Results Reviewed:   Imaging Personally Reviewed:  Electrocardiogram Personally Reviewed:    COORDINATION OF CARE:  Care Discussed with Consultants/Other Providers [Y/N]:  Prior or Outpatient Records Reviewed [Y/N]:

## 2022-03-30 NOTE — PROGRESS NOTE ADULT - ASSESSMENT
76M w/ pmh of HTN, HLD, PVD s/p R CARMELINA who was brought by EMS to [facility] after a mechanical fall from his wheelchair found to have CHF exacerbation, PNA as well as acute LLE arterial occlusions w/ unsuccessful angiogram. Stress test at OSH was positive, transferred for angiogram for cardiac clearance for LLE AKA.

## 2022-03-30 NOTE — PROGRESS NOTE ADULT - PROBLEM SELECTOR PLAN 3
Baseline unknown but improved  Renal sono with decreased echogenicty c/w parenchymal disease  Continue Sodium Bicarb  potassium stable today

## 2022-03-30 NOTE — PROGRESS NOTE ADULT - PROBLEM SELECTOR PLAN 1
[facility] completed, LM 10%, LAD 60%, Cx and RCA  with collaterals   Per cardiology high risk patient undergoing intermediate risk surgery/procedure OPTIMIZED to the lowest risk predicted by the RCRI based on co-morbidities and nature of operation/procedure.   Continue medical management with Coreg, Lipitor, Plavix

## 2022-03-31 LAB
ANION GAP SERPL CALC-SCNC: 14 MMOL/L — SIGNIFICANT CHANGE UP (ref 5–17)
BUN SERPL-MCNC: 12 MG/DL — SIGNIFICANT CHANGE UP (ref 7–23)
CALCIUM SERPL-MCNC: 8.2 MG/DL — LOW (ref 8.4–10.5)
CHLORIDE SERPL-SCNC: 114 MMOL/L — HIGH (ref 96–108)
CO2 SERPL-SCNC: 20 MMOL/L — LOW (ref 22–31)
CREAT SERPL-MCNC: 1.04 MG/DL — SIGNIFICANT CHANGE UP (ref 0.5–1.3)
EGFR: 75 ML/MIN/1.73M2 — SIGNIFICANT CHANGE UP
GLUCOSE BLDC GLUCOMTR-MCNC: 113 MG/DL — HIGH (ref 70–99)
GLUCOSE BLDC GLUCOMTR-MCNC: 137 MG/DL — HIGH (ref 70–99)
GLUCOSE BLDC GLUCOMTR-MCNC: 214 MG/DL — HIGH (ref 70–99)
GLUCOSE BLDC GLUCOMTR-MCNC: 96 MG/DL — SIGNIFICANT CHANGE UP (ref 70–99)
GLUCOSE SERPL-MCNC: 89 MG/DL — SIGNIFICANT CHANGE UP (ref 70–99)
HCT VFR BLD CALC: 27.2 % — LOW (ref 39–50)
HGB BLD-MCNC: 9 G/DL — LOW (ref 13–17)
MAGNESIUM SERPL-MCNC: 1.8 MG/DL — SIGNIFICANT CHANGE UP (ref 1.6–2.6)
MCHC RBC-ENTMCNC: 26.8 PG — LOW (ref 27–34)
MCHC RBC-ENTMCNC: 33.1 GM/DL — SIGNIFICANT CHANGE UP (ref 32–36)
MCV RBC AUTO: 81 FL — SIGNIFICANT CHANGE UP (ref 80–100)
NRBC # BLD: 0 /100 WBCS — SIGNIFICANT CHANGE UP (ref 0–0)
PHOSPHATE SERPL-MCNC: 2.4 MG/DL — LOW (ref 2.5–4.5)
PLATELET # BLD AUTO: 185 K/UL — SIGNIFICANT CHANGE UP (ref 150–400)
POTASSIUM SERPL-MCNC: 3.7 MMOL/L — SIGNIFICANT CHANGE UP (ref 3.5–5.3)
POTASSIUM SERPL-SCNC: 3.7 MMOL/L — SIGNIFICANT CHANGE UP (ref 3.5–5.3)
RBC # BLD: 3.36 M/UL — LOW (ref 4.2–5.8)
RBC # FLD: 17.9 % — HIGH (ref 10.3–14.5)
SODIUM SERPL-SCNC: 148 MMOL/L — HIGH (ref 135–145)
WBC # BLD: 8.14 K/UL — SIGNIFICANT CHANGE UP (ref 3.8–10.5)
WBC # FLD AUTO: 8.14 K/UL — SIGNIFICANT CHANGE UP (ref 3.8–10.5)

## 2022-03-31 PROCEDURE — 99232 SBSQ HOSP IP/OBS MODERATE 35: CPT

## 2022-03-31 RX ORDER — POTASSIUM CHLORIDE 20 MEQ
10 PACKET (EA) ORAL DAILY
Refills: 0 | Status: DISCONTINUED | OUTPATIENT
Start: 2022-04-01 | End: 2022-04-01

## 2022-03-31 RX ORDER — POTASSIUM PHOSPHATE, MONOBASIC POTASSIUM PHOSPHATE, DIBASIC 236; 224 MG/ML; MG/ML
15 INJECTION, SOLUTION INTRAVENOUS ONCE
Refills: 0 | Status: COMPLETED | OUTPATIENT
Start: 2022-03-31 | End: 2022-03-31

## 2022-03-31 RX ORDER — POTASSIUM CHLORIDE 20 MEQ
20 PACKET (EA) ORAL ONCE
Refills: 0 | Status: COMPLETED | OUTPATIENT
Start: 2022-03-31 | End: 2022-03-31

## 2022-03-31 RX ORDER — POTASSIUM CHLORIDE 20 MEQ
20 PACKET (EA) ORAL ONCE
Refills: 0 | Status: DISCONTINUED | OUTPATIENT
Start: 2022-03-31 | End: 2022-03-31

## 2022-03-31 RX ORDER — MAGNESIUM OXIDE 400 MG ORAL TABLET 241.3 MG
400 TABLET ORAL ONCE
Refills: 0 | Status: COMPLETED | OUTPATIENT
Start: 2022-03-31 | End: 2022-03-31

## 2022-03-31 RX ORDER — SODIUM CHLORIDE 9 MG/ML
1000 INJECTION, SOLUTION INTRAVENOUS
Refills: 0 | Status: DISCONTINUED | OUTPATIENT
Start: 2022-03-31 | End: 2022-04-01

## 2022-03-31 RX ORDER — HYDROMORPHONE HYDROCHLORIDE 2 MG/ML
0.5 INJECTION INTRAMUSCULAR; INTRAVENOUS; SUBCUTANEOUS ONCE
Refills: 0 | Status: DISCONTINUED | OUTPATIENT
Start: 2022-03-31 | End: 2022-03-31

## 2022-03-31 RX ADMIN — OXYCODONE HYDROCHLORIDE 5 MILLIGRAM(S): 5 TABLET ORAL at 06:24

## 2022-03-31 RX ADMIN — Medication 1 TABLET(S): at 12:38

## 2022-03-31 RX ADMIN — Medication 2: at 08:54

## 2022-03-31 RX ADMIN — SENNA PLUS 2 TABLET(S): 8.6 TABLET ORAL at 21:08

## 2022-03-31 RX ADMIN — Medication 650 MILLIGRAM(S): at 22:09

## 2022-03-31 RX ADMIN — PANTOPRAZOLE SODIUM 40 MILLIGRAM(S): 20 TABLET, DELAYED RELEASE ORAL at 05:24

## 2022-03-31 RX ADMIN — Medication 650 MILLIGRAM(S): at 21:09

## 2022-03-31 RX ADMIN — Medication 500 MILLIGRAM(S): at 12:38

## 2022-03-31 RX ADMIN — Medication 4 MILLIGRAM(S): at 21:09

## 2022-03-31 RX ADMIN — HYDROMORPHONE HYDROCHLORIDE 0.5 MILLIGRAM(S): 2 INJECTION INTRAMUSCULAR; INTRAVENOUS; SUBCUTANEOUS at 08:37

## 2022-03-31 RX ADMIN — CHLORHEXIDINE GLUCONATE 1 APPLICATION(S): 213 SOLUTION TOPICAL at 09:04

## 2022-03-31 RX ADMIN — Medication 1300 MILLIGRAM(S): at 21:08

## 2022-03-31 RX ADMIN — Medication 1300 MILLIGRAM(S): at 05:24

## 2022-03-31 RX ADMIN — CARVEDILOL PHOSPHATE 12.5 MILLIGRAM(S): 80 CAPSULE, EXTENDED RELEASE ORAL at 18:28

## 2022-03-31 RX ADMIN — Medication 1300 MILLIGRAM(S): at 15:06

## 2022-03-31 RX ADMIN — Medication 20 MILLIEQUIVALENT(S): at 10:08

## 2022-03-31 RX ADMIN — Medication 650 MILLIGRAM(S): at 12:37

## 2022-03-31 RX ADMIN — CARVEDILOL PHOSPHATE 12.5 MILLIGRAM(S): 80 CAPSULE, EXTENDED RELEASE ORAL at 05:24

## 2022-03-31 RX ADMIN — POTASSIUM PHOSPHATE, MONOBASIC POTASSIUM PHOSPHATE, DIBASIC 62.5 MILLIMOLE(S): 236; 224 INJECTION, SOLUTION INTRAVENOUS at 10:07

## 2022-03-31 RX ADMIN — BUDESONIDE AND FORMOTEROL FUMARATE DIHYDRATE 2 PUFF(S): 160; 4.5 AEROSOL RESPIRATORY (INHALATION) at 21:08

## 2022-03-31 RX ADMIN — MAGNESIUM OXIDE 400 MG ORAL TABLET 400 MILLIGRAM(S): 241.3 TABLET ORAL at 09:01

## 2022-03-31 RX ADMIN — CLOPIDOGREL BISULFATE 75 MILLIGRAM(S): 75 TABLET, FILM COATED ORAL at 12:38

## 2022-03-31 RX ADMIN — BUDESONIDE AND FORMOTEROL FUMARATE DIHYDRATE 2 PUFF(S): 160; 4.5 AEROSOL RESPIRATORY (INHALATION) at 08:55

## 2022-03-31 RX ADMIN — SODIUM CHLORIDE 75 MILLILITER(S): 9 INJECTION, SOLUTION INTRAVENOUS at 14:53

## 2022-03-31 RX ADMIN — OXYCODONE HYDROCHLORIDE 5 MILLIGRAM(S): 5 TABLET ORAL at 05:24

## 2022-03-31 RX ADMIN — ATORVASTATIN CALCIUM 40 MILLIGRAM(S): 80 TABLET, FILM COATED ORAL at 21:09

## 2022-03-31 NOTE — DISCHARGE NOTE PROVIDER - NSDCCPCAREPLAN_GEN_ALL_CORE_FT
PRINCIPAL DISCHARGE DIAGNOSIS  Diagnosis: Peripheral arterial occlusive disease  Assessment and Plan of Treatment: s/p Left AKA 3/28.   continue pain control.   continue PT and safety precaution.   continue daily dressing change at rehab.      SECONDARY DISCHARGE DIAGNOSES  Diagnosis: Positive cardiac stress test  Assessment and Plan of Treatment: s/p cardiac cath w/  LM 10%, LAD 60%, Cx and RCA  with collaterals.   Continue medical management with Coreg, Lipitor, Plavix.    Diagnosis: Leukocytosis  Assessment and Plan of Treatment: no signs and symptoms of infection. afebrile.  resolved.    Diagnosis: BAUDILIO (acute kidney injury)  Assessment and Plan of Treatment: Avoid taking (NSAIDs) - (ex: Ibuprofen, Advil, Celebrex, Naprosyn)  Avoid taking any nephrotoxic agents (can harm kidneys) - Intravenous contrast for diagnostic testing, combination cold medications.  Have all medications adjusted for your renal function by your Health Care Provider.  Blood pressure control is important.  Take all medication as prescribed.      Diagnosis: Chronic heart failure  Assessment and Plan of Treatment: Weigh yourself daily.  If you gain 3lbs in 3 days, or 5lbs in a week call your Health Care Provider.  Do not eat or drink foods containing more than 2000mg of salt (sodium) in your diet every day.  Call your Health Care Provider if you have any swelling or increased swelling in your feet, ankles, and/or stomach.  Take all of your medication as directed.  If you become dizzy call your Health Care Provider.       PRINCIPAL DISCHARGE DIAGNOSIS  Diagnosis: Peripheral arterial occlusive disease  Assessment and Plan of Treatment: s/p Left AKA 3/28.   continue pain control.   continue PT and safety precaution.   continue daily dressing change at rehab.      SECONDARY DISCHARGE DIAGNOSES  Diagnosis: Hypokalemia  Assessment and Plan of Treatment: Potassium is one of the primary electrolytes.Potassium lost from the GI tract may be caused by vomiting or diarrhea.  Notify your doctor if you develop: muscle weakness, muscle aches, muscle cramps, and/or palpitations (irregular heart rate).  Follow up with your doctor for blood work and electrolyte monitoring.      Diagnosis: Positive cardiac stress test  Assessment and Plan of Treatment: s/p cardiac cath w/  LM 10%, LAD 60%, Cx and RCA  with collaterals.   Continue medical management with Coreg, Lipitor, Plavix.    Diagnosis: Leukocytosis  Assessment and Plan of Treatment: no signs and symptoms of infection. afebrile.  resolved.    Diagnosis: BAUDILIO (acute kidney injury)  Assessment and Plan of Treatment: Avoid taking (NSAIDs) - (ex: Ibuprofen, Advil, Celebrex, Naprosyn)  Avoid taking any nephrotoxic agents (can harm kidneys) - Intravenous contrast for diagnostic testing, combination cold medications.  Have all medications adjusted for your renal function by your Health Care Provider.  Blood pressure control is important.  Take all medication as prescribed.      Diagnosis: HTN (hypertension)  Assessment and Plan of Treatment: Hypertension, also known simply as "high blood pressure" is very common, however can lead to many significant complications if left uncontrolled. When the blood pressure is elevated, the force the blood puts on the walls of the arteries is high and can lead to artery damage. Also, when the heart muscle has to pump blood against a high blood pressure, it thickens and enlarges, just like any muscle does when it has to do more work (think of a weight ). When the blood pressure is very high, people may feel a headache or tired. Some people can feel pounding in their head or have blurry vision. Hearing the heart beating in the ear especially at night can be a sign of high blood pressure. Eventually, symptoms of stroke, heart attack, heart failure or irregular heartbeats can occur  - Exercise: Doing cardiovascular exercise such as running, biking or swimming at least 30 minutes per day most days of the week is recommended to help keep blood pressure healthy  - Lose weight: Maintaining a normal BMI (body mass index) is very important in keeping blood pressure readings normal   - Avoid salt: Sodium in the diet increases the blood pressure in many ways. Salt comes in many foods, so just because you don't add salt to your food it does not mean that you are eating a low salt diet. Read labels and keep sodium intake to less than 2000 mg per day   - Avoid alcohol: Even 1 or 2 alcoholic drinks can significantly increase blood pressure   - DASH Diet: The DASH diet has been shown to reduce blood pressure   - Take all medication as prescribed.   - Follow up with your medical doctor for routine blood pressure monitoring at your next visit.   - Notify your doctor if you have any of the following symptoms:    - Dizziness, Lightheadedness, Blurry vision, Headache, Chest pain, Shortness of breath      Diagnosis: Chronic heart failure  Assessment and Plan of Treatment: Weigh yourself daily.  If you gain 3lbs in 3 days, or 5lbs in a week call your Health Care Provider.  Do not eat or drink foods containing more than 2000mg of salt (sodium) in your diet every day.  Call your Health Care Provider if you have any swelling or increased swelling in your feet, ankles, and/or stomach.  Take all of your medication as directed.  If you become dizzy call your Health Care Provider.

## 2022-03-31 NOTE — DISCHARGE NOTE PROVIDER - NSDCFUADDINST_GEN_ALL_CORE_FT
Wound care:   Buttocks: TRIAD paste BID & prn soiling to buttocks w/ pericare hygeine   SKIN: SWEEN cream to moisturize skin BID  Z molly & Complete CAIR boot to assist w/ offloading  WAFFLE cushion to chair when oob to chair    Left Leg  Packing: cover the incision of gauze and abd, then warp with cling and Ace wrap  Primary dressing: Dry Gauze and ABD pad  Frequency: Daily

## 2022-03-31 NOTE — DISCHARGE NOTE PROVIDER - HOSPITAL COURSE
76M w/ pmh of HTN, HLD, PVD s/p R AKA who was brought by EMS to [facility] after a mechanical fall from his wheelchair found to have CHF exacerbation, PNA as well as acute LLE arterial occlusions w/ unsuccessful angiogram. Stress test at OSH was positive, transferred for angiogram for cardiac clearance for LLE AKA. s/p LHC with LM 10%, LAD 60%, Cx and RCA  with collaterals. hx of chf, s/p echo w/ EF 35%. Off ACE-I/ARB 2/2 BAUDILIO on CKD. CXR showed decreased congestive changes. BAUDILIO on CKD, s/p renal us w/ decreased echogenicty c/w parenchymal disease, on Sodium Bicarb. pt underwent L AKA on 3/28. patient will require daily dressing change at rehab but no further inpatient needs, c/w pain control as per vascular.     pt remains stable for DC and will f/u with PCP, vascular and cardio

## 2022-03-31 NOTE — PROGRESS NOTE ADULT - PROBLEM SELECTOR PLAN 1
[facility] completed, LM 10%, LAD 60%, Cx and RCA  with collaterals   Per cardiology high risk patient undergoing intermediate risk surgery/procedure OPTIMIZED to the lowest risk predicted by the RCRI based on co-morbidities and nature of operation/procedure.   Continue medical management with Coreg, Lipitor, Plavix

## 2022-03-31 NOTE — PROGRESS NOTE ADULT - ASSESSMENT
76M w/ hx of HTN, HLD, PAD, right AKA, transferred from [facility] for cardiac cath after he underwent a LLE angiogram and had ST elevation during the procedure. Since transfer patient underwent diagnostic cardiac catheterization showing severe CAD in the LCx and RCA. Following discussion of GOC decision made to undergo amputation.     Recommendation   - S/P L AKA with Dr. Miranda; procedure well tolerated; incision c/d/i on exam this AM   - Pain medication as needed;  - L AKA dressing change daily, wound care ordered in, dilaudid 0.5mg iv before dressing change as needed  - care per primary team  - Please recall us if there are any questions.    Vascular Surgery Team  [phone]

## 2022-03-31 NOTE — PROGRESS NOTE ADULT - SUBJECTIVE AND OBJECTIVE BOX
[facility] Division of Hospital Medicine  Jessica Vigil  pager [phone]    Patient is a 76y old  Male who presents with a chief complaint of Cardiac clearnace for ischemic limb (31 Mar 2022 08:38)      SUBJECTIVE / OVERNIGHT EVENTS:  Endorses some pain this AM.  Otherwise no CP, SOB, dizziness, fever. Poor appetite     MEDICATIONS  (STANDING):  albuterol/ipratropium for Nebulization 3 milliLiter(s) Nebulizer every 6 hours  ascorbic acid 500 milliGRAM(s) Oral daily  atorvastatin 40 milliGRAM(s) Oral at bedtime  budesonide 160 MICROgram(s)/formoterol 4.5 MICROgram(s) Inhaler 2 Puff(s) Inhalation two times a day  carvedilol 12.5 milliGRAM(s) Oral every 12 hours  chlorhexidine 2% Cloths 1 Application(s) Topical <User Schedule>  chlorhexidine 4% Liquid 1 Application(s) Topical <User Schedule>  clopidogrel Tablet 75 milliGRAM(s) Oral daily  dextrose 5%. 1000 milliLiter(s) (75 mL/Hr) IV Continuous <Continuous>  doxazosin 4 milliGRAM(s) Oral at bedtime  insulin lispro (ADMELOG) corrective regimen sliding scale   SubCutaneous three times a day before meals  Nephro-carlton 1 Tablet(s) Oral daily  pantoprazole   Suspension 40 milliGRAM(s) Oral before breakfast  senna 2 Tablet(s) Oral at bedtime  sodium bicarbonate 1300 milliGRAM(s) Oral three times a day    MEDICATIONS  (PRN):  acetaminophen     Tablet .. 650 milliGRAM(s) Oral every 6 hours PRN Moderate Pain (4 - 6)  HYDROmorphone   Tablet 2 milliGRAM(s) Oral three times a day PRN Severe Pain (7 - 10)  oxyCODONE    IR 5 milliGRAM(s) Oral every 4 hours PRN Moderate Pain (4 - 6)  polyethylene glycol 3350 17 Gram(s) Oral daily PRN Constipation      CAPILLARY BLOOD GLUCOSE      POCT Blood Glucose.: 214 mg/dL (31 Mar 2022 08:16)  POCT Blood Glucose.: 99 mg/dL (30 Mar 2022 22:20)  POCT Blood Glucose.: 87 mg/dL (30 Mar 2022 21:51)  POCT Blood Glucose.: 90 mg/dL (30 Mar 2022 16:58)  POCT Blood Glucose.: 96 mg/dL (30 Mar 2022 11:58)    I&O's Summary    30 Mar 2022 07:01  -  31 Mar 2022 07:00  --------------------------------------------------------  IN: 240 mL / OUT: 0 mL / NET: 240 mL    31 Mar 2022 07:01  -  31 Mar 2022 11:49  --------------------------------------------------------  IN: 120 mL / OUT: 0 mL / NET: 120 mL        PHYSICAL EXAM:  Vital Signs Last 24 Hrs  T(C): 36.7 (31 Mar 2022 11:33), Max: 37.1 (30 Mar 2022 20:58)  T(F): 98 (31 Mar 2022 11:33), Max: 98.8 (30 Mar 2022 20:58)  HR: 78 (31 Mar 2022 11:33) (78 - 86)  BP: 138/74 (31 Mar 2022 11:33) (138/74 - 153/72)  BP(mean): --  RR: 18 (31 Mar 2022 11:33) (16 - 18)  SpO2: 99% (31 Mar 2022 11:33) (95% - 99%)    CONSTITUTIONAL: NAD, well-developed  RESPIRATORY: Normal respiratory effort; lungs are clear to auscultation bilaterally  CARDIOVASCULAR: Regular rate and rhythm, normal S1 and S2  ABDOMEN: Nontender to palpation, normoactive bowel sounds, no rebound/guarding  MUSCULOSKELETAL: no clubbing or cyanosis of digits; no joint swelling or tenderness to palpation, R AKA, L AKA - dressing and ACE wrap c/d/i  PSYCH: A+O to person, place, and time; affect appropriate    LABS:                        9.0    8.14  )-----------( 185      ( 31 Mar 2022 06:09 )             27.2     03-31    148<H>  |  114<H>  |  12  ----------------------------<  89  3.7   |  20<L>  |  1.04    Ca    8.2<L>      31 Mar 2022 06:09  Phos  2.4     03-31  Mg     1.8     03-31                  RADIOLOGY & ADDITIONAL TESTS:  Results Reviewed:   Imaging Personally Reviewed:  Electrocardiogram Personally Reviewed:    COORDINATION OF CARE:  Care Discussed with Consultants/Other Providers [Y/N]:  Prior or Outpatient Records Reviewed [Y/N]:

## 2022-03-31 NOTE — DISCHARGE NOTE PROVIDER - NSDCFUADDAPPT_GEN_ALL_CORE_FT
You will be discharged to rehab. Please continue to work with physical therapy to re-gain strength. Please follow up with your PCP,  in a week after being discharged from rehab for further management

## 2022-03-31 NOTE — DISCHARGE NOTE PROVIDER - NSDCMRMEDTOKEN_GEN_ALL_CORE_FT
acetaminophen 325 mg oral tablet: 2 tab(s) orally every 6 hours, As needed, Moderate Pain (4 - 6)  ascorbic acid 500 mg oral tablet: 1 tab(s) orally once a day  atorvastatin 40 mg oral tablet: 1 tab(s) orally once a day (at bedtime)  budesonide-formoterol 160 mcg-4.5 mcg/inh inhalation aerosol: 2 inhaler(s) inhaled 2 times a day  carvedilol 12.5 mg oral tablet: 1 tab(s) orally every 12 hours  clopidogrel 75 mg oral tablet: 1 tab(s) orally once a day  doxazosin 4 mg oral tablet: 1 tab(s) orally once a day (at bedtime)  Nephro-Kartik oral tablet: 1 tab(s) orally once a day  oxyCODONE 5 mg oral tablet: 1 tab(s) orally every 4 hours, As needed, Moderate Pain (4 - 6)  pantoprazole 40 mg oral granule, delayed release: 40 milligram(s) orally once a day  polyethylene glycol 3350 oral powder for reconstitution: 17 gram(s) orally once a day, As needed, Constipation  potassium chloride 20 mEq/15 mL oral liquid: 7 milliliter(s) (10mEq) orally once a day  senna oral tablet: 2 tab(s) orally once a day (at bedtime)  sodium bicarbonate 650 mg oral tablet: 2 tab(s) orally 3 times a day

## 2022-03-31 NOTE — CHART NOTE - NSCHARTNOTEFT_GEN_A_CORE
Nutrition Follow Up Note  Patient seen for: malnutrition initial follow-up. Chart reviewed, events noted.     "76M w/ pmh of HTN, HLD, PVD s/p R CARMELINA who was brought by EMS to [facility] after a mechanical fall from his wheelchair found to have CHF exacerbation, PNA as well as acute LLE arterial occlusions w/ unsuccessful angiogram. Stress test at OSH was positive, transferred for angiogram for cardiac clearance for LLE AKA."    Source: [x] Patient       [x] Medical Record        [] RN        [] Family at bedside       [x] Other: PCA    -If unable to interview patient: [] Trach/Vent/BiPAP  [] Disoriented/confused/inappropriate to interview    Diet Order:   Diet, Consistent Carbohydrate w/Evening Snack:   DASH/TLC {Sodium & Cholesterol Restricted} (DASH)  Pureed (PUREED)  Mahin(7 Gm Arginine/7 Gm Glut/1.2 Gm HMB     Qty per Day:  2  Supplement Feeding Modality:  Oral (22)    - Is current order appropriate/adequate? [] Yes  [x]  No:     - PO intake :   [] >75%  Adequate    [] 50-75%  Fair       [x] <50%  Poor    - Nutrition-related concerns:      - Per pt and per PCA, pt with poor PO intake, not having the Mahin.      - Per chart: pt ordered for admelog SSI, potassium chloride, Vitamin C, and Nephro-Carlton         GI: Denies nausea, vomiting, diarrhea. Reports constipation. Reports last BM 3/31.   Bowel Regimen? [x] Yes (miralax, senna)  [] No      Weights:   Daily Weight in k.2 (), 77 (), 75.3 (), 75.3 ()  Dosing wt: 71.3 kg ()  S/p L AKA 3/28. RD to continue to monitor weight trends as able/available.   New BMI based on bilateral AKA = 29.3 kg/m^2  New IBW based on bilateral AKA = 53.8 kg    MEDICATIONS  (STANDING):  albuterol/ipratropium for Nebulization 3 milliLiter(s) Nebulizer every 6 hours  ascorbic acid 500 milliGRAM(s) Oral daily  atorvastatin 40 milliGRAM(s) Oral at bedtime  budesonide 160 MICROgram(s)/formoterol 4.5 MICROgram(s) Inhaler 2 Puff(s) Inhalation two times a day  carvedilol 12.5 milliGRAM(s) Oral every 12 hours  chlorhexidine 2% Cloths 1 Application(s) Topical <User Schedule>  chlorhexidine 4% Liquid 1 Application(s) Topical <User Schedule>  clopidogrel Tablet 75 milliGRAM(s) Oral daily  dextrose 5%. 1000 milliLiter(s) (75 mL/Hr) IV Continuous <Continuous>  doxazosin 4 milliGRAM(s) Oral at bedtime  insulin lispro (ADMELOG) corrective regimen sliding scale   SubCutaneous three times a day before meals  Nephro-carlton 1 Tablet(s) Oral daily  pantoprazole   Suspension 40 milliGRAM(s) Oral before breakfast  senna 2 Tablet(s) Oral at bedtime  sodium bicarbonate 1300 milliGRAM(s) Oral three times a day    MEDICATIONS  (PRN):  acetaminophen     Tablet .. 650 milliGRAM(s) Oral every 6 hours PRN Moderate Pain (4 - 6)  HYDROmorphone   Tablet 2 milliGRAM(s) Oral three times a day PRN Severe Pain (7 - 10)  oxyCODONE    IR 5 milliGRAM(s) Oral every 4 hours PRN Moderate Pain (4 - 6)  polyethylene glycol 3350 17 Gram(s) Oral daily PRN Constipation      Pertinent Labs:    @ 06:09: Na 148<H>, BUN 12, Cr 1.04, BG 89, K+ 3.7, Phos 2.4<L>, Mg 1.8    A1C with Estimated Average Glucose Result: 5.7 % (22 @ 23:30)    Finger Sticks:  POCT Blood Glucose.: 96 mg/dL ( @ 11:47)  POCT Blood Glucose.: 214 mg/dL ( @ 08:16)  POCT Blood Glucose.: 99 mg/dL ( @ 22:20)  POCT Blood Glucose.: 87 mg/dL ( @ 21:51)  POCT Blood Glucose.: 90 mg/dL ( @ 16:58)      Skin per nursing documentation: Stage 2-sacrum; prior R AKA, s/p L AKA 3/28  Edema per nursing documentation: none noted    Estimated Needs:   [x] recalculated: based on L AKA, Stage 2 Pressure Injury, CHF  30-35 kcal/kg = 2812-0657 kcal/day  1.4-1.7 g/kg =  g pro/day  fluids deferred to team  based on 68.2 kg (03-31)    Previous Nutrition Diagnosis: Moderate acute malnutrition; increased nutrient needs  Nutrition Diagnosis is: [x] ongoing  [] resolved [] not applicable   -Being addressed with PO diet, oral nutrition supplements, micronutrient supplementation, and food preferences.     Nutrition Care Plan:  [x] In Progress  [] Achieved  [] Not applicable    New Nutrition Diagnosis: [x] Not applicable    Nutrition Interventions:     Education Provided   [x] Yes: Discussed importance of adequate protein-energy consumption to meet estimated nutrient needs. Encouraged intake of meals and oral nutrition supplements as tolerated. Encouraged intake of protein-rich foods first at mealtimes to help preserve lean muscle mass. Reviewed food choices that are high in protein. Pt noted with good comprehension and made aware RD remains available for further questions/concerns.     Recommendations:      1) To promote increased PO intake, would recommend liberalize diet to: Low Sodium, Consistent Carbohydrate diet with snack. Defer texture to team/SLP. Continue Mahin 2x/day to promote wound healing.  2) Consider changing Nephro-Carlton supplement to multivitamin, pending no medical contraindications. Continue Vitamin C, pending no medical contraindications.  3) Monitor PO intake, GI tolerance, skin integrity, labs, weight, and bowel movement regularity.   4) Honor food preferences as feasible. Assist with meals PRN and encourage PO intake.  5) malnutrition alert in chart     Monitoring and Evaluation:   Continue to monitor nutritional intake, tolerance to diet prescription, weights, labs, skin integrity    RD remains available upon request and will follow up per protocol  Gosia Franklin, HO, CDN Pager #[phone]

## 2022-03-31 NOTE — PROGRESS NOTE ADULT - SUBJECTIVE AND OBJECTIVE BOX
Surgery Progress Note    INTERVAl/SUBJECTIVE: No acute event overnight. Patient seen and examined in am rounds. Patient is eating breakfast comfortably, pain under control. Changed dressing of left AKA during rounds.     Vital Signs Last 24 Hrs  T(C): 36.7 (31 Mar 2022 05:02), Max: 37.1 (30 Mar 2022 20:58)  T(F): 98.1 (31 Mar 2022 05:02), Max: 98.8 (30 Mar 2022 20:58)  HR: 86 (31 Mar 2022 05:02) (78 - 86)  BP: 153/72 (31 Mar 2022 05:02) (126/65 - 153/72)  BP(mean): --  RR: 18 (31 Mar 2022 05:02) (16 - 18)  SpO2: 99% (31 Mar 2022 05:02) (95% - 99%)    Physical Exam:  Constitutional: resting in bed with no acute distress  Respiratory: unlabored breathing, clear respiration  Gastrointestinal: Abdomen soft, non distended, non-tender  Vascular:  LLE s/p AKA incision c/d/i, dressing changed.     LABS:                        9.0    8.14  )-----------( 185      ( 31 Mar 2022 06:09 )             27.2     03-31    148<H>  |  114<H>  |  12  ----------------------------<  89  3.7   |  20<L>  |  1.04    Ca    8.2<L>      31 Mar 2022 06:09  Phos  2.4     03-31  Mg     1.8     03-31            INs and OUTs:    03-30-22 @ 07:01  -  03-31-22 @ 07:00  --------------------------------------------------------  IN: 240 mL / OUT: 0 mL / NET: 240 mL

## 2022-03-31 NOTE — PROGRESS NOTE ADULT - ASSESSMENT
76M w/ pmh of HTN, HLD, PVD s/p R CARMELINA who was brought by EMS to [facility] after a mechanical fall from his wheelchair found to have CHF exacerbation, PNA as well as acute LLE arterial occlusions w/ unsuccessful angiogram. Stress test at OSH was positive, transferred for angiogram for cardiac clearance for LLE AKA.

## 2022-03-31 NOTE — STUDENT SIGN OFF DOCUMENT - CO-SIGNATURE DATE
18-Mar-2022 12:34
24-Mar-2022 15:27
31-Mar-2022 14:37
15-Mar-2022 11:24
18-Mar-2022 09:31
22-Mar-2022 14:52

## 2022-03-31 NOTE — DISCHARGE NOTE PROVIDER - CARE PROVIDER_API CALL
Harris Sterling)  Surgery; Vascular Surgery  [address], [address]  [place] [address]  Phone: [phone]  Fax: [phone]  Follow Up Time: 2 weeks

## 2022-03-31 NOTE — STUDENT SIGN OFF DOCUMENT - COPY OF STUDENT DOCUMENT REVIEW
Physical Therapy 

## 2022-03-31 NOTE — PROGRESS NOTE ADULT - PROBLEM SELECTOR PLAN 3
Baseline unknown but improved  Renal sono with decreased echogenicty c/w parenchymal disease  Continue Sodium Bicarb  Requiring daily potassium repletion - start standing PO 20meq daily  Hypernatremia - poor PO intake. Encouraged PO, IVF Baseline unknown but improved  Renal sono with decreased echogenicty c/w parenchymal disease  Continue Sodium Bicarb  Requiring daily potassium repletion - start standing PO 10meq daily  Hypernatremia - poor PO intake. Encouraged PO, IVF

## 2022-04-01 VITALS
HEART RATE: 75 BPM | DIASTOLIC BLOOD PRESSURE: 69 MMHG | OXYGEN SATURATION: 95 % | SYSTOLIC BLOOD PRESSURE: 149 MMHG | RESPIRATION RATE: 18 BRPM | TEMPERATURE: 98 F

## 2022-04-01 LAB
ANION GAP SERPL CALC-SCNC: 10 MMOL/L — SIGNIFICANT CHANGE UP (ref 5–17)
BUN SERPL-MCNC: 11 MG/DL — SIGNIFICANT CHANGE UP (ref 7–23)
CALCIUM SERPL-MCNC: 8.1 MG/DL — LOW (ref 8.4–10.5)
CHLORIDE SERPL-SCNC: 109 MMOL/L — HIGH (ref 96–108)
CO2 SERPL-SCNC: 21 MMOL/L — LOW (ref 22–31)
CREAT SERPL-MCNC: 1.04 MG/DL — SIGNIFICANT CHANGE UP (ref 0.5–1.3)
EGFR: 75 ML/MIN/1.73M2 — SIGNIFICANT CHANGE UP
GLUCOSE BLDC GLUCOMTR-MCNC: 123 MG/DL — HIGH (ref 70–99)
GLUCOSE BLDC GLUCOMTR-MCNC: 98 MG/DL — SIGNIFICANT CHANGE UP (ref 70–99)
GLUCOSE SERPL-MCNC: 98 MG/DL — SIGNIFICANT CHANGE UP (ref 70–99)
HCT VFR BLD CALC: 27.6 % — LOW (ref 39–50)
HGB BLD-MCNC: 9.1 G/DL — LOW (ref 13–17)
MAGNESIUM SERPL-MCNC: 1.6 MG/DL — SIGNIFICANT CHANGE UP (ref 1.6–2.6)
MCHC RBC-ENTMCNC: 26.7 PG — LOW (ref 27–34)
MCHC RBC-ENTMCNC: 33 GM/DL — SIGNIFICANT CHANGE UP (ref 32–36)
MCV RBC AUTO: 80.9 FL — SIGNIFICANT CHANGE UP (ref 80–100)
NRBC # BLD: 0 /100 WBCS — SIGNIFICANT CHANGE UP (ref 0–0)
PHOSPHATE SERPL-MCNC: 2.4 MG/DL — LOW (ref 2.5–4.5)
PLATELET # BLD AUTO: 183 K/UL — SIGNIFICANT CHANGE UP (ref 150–400)
POTASSIUM SERPL-MCNC: 3.3 MMOL/L — LOW (ref 3.5–5.3)
POTASSIUM SERPL-SCNC: 3.3 MMOL/L — LOW (ref 3.5–5.3)
RBC # BLD: 3.41 M/UL — LOW (ref 4.2–5.8)
RBC # FLD: 18 % — HIGH (ref 10.3–14.5)
SARS-COV-2 RNA SPEC QL NAA+PROBE: SIGNIFICANT CHANGE UP
SODIUM SERPL-SCNC: 140 MMOL/L — SIGNIFICANT CHANGE UP (ref 135–145)
WBC # BLD: 7.29 K/UL — SIGNIFICANT CHANGE UP (ref 3.8–10.5)
WBC # FLD AUTO: 7.29 K/UL — SIGNIFICANT CHANGE UP (ref 3.8–10.5)

## 2022-04-01 PROCEDURE — 93005 ELECTROCARDIOGRAM TRACING: CPT

## 2022-04-01 PROCEDURE — 80061 LIPID PANEL: CPT

## 2022-04-01 PROCEDURE — 85610 PROTHROMBIN TIME: CPT

## 2022-04-01 PROCEDURE — 97110 THERAPEUTIC EXERCISES: CPT

## 2022-04-01 PROCEDURE — C8929: CPT

## 2022-04-01 PROCEDURE — 82565 ASSAY OF CREATININE: CPT

## 2022-04-01 PROCEDURE — 82570 ASSAY OF URINE CREATININE: CPT

## 2022-04-01 PROCEDURE — 88307 TISSUE EXAM BY PATHOLOGIST: CPT

## 2022-04-01 PROCEDURE — 97163 PT EVAL HIGH COMPLEX 45 MIN: CPT

## 2022-04-01 PROCEDURE — 86769 SARS-COV-2 COVID-19 ANTIBODY: CPT

## 2022-04-01 PROCEDURE — 80048 BASIC METABOLIC PNL TOTAL CA: CPT

## 2022-04-01 PROCEDURE — 84443 ASSAY THYROID STIM HORMONE: CPT

## 2022-04-01 PROCEDURE — 86900 BLOOD TYPING SEROLOGIC ABO: CPT

## 2022-04-01 PROCEDURE — 84295 ASSAY OF SERUM SODIUM: CPT

## 2022-04-01 PROCEDURE — U0005: CPT

## 2022-04-01 PROCEDURE — 36415 COLL VENOUS BLD VENIPUNCTURE: CPT

## 2022-04-01 PROCEDURE — 82962 GLUCOSE BLOOD TEST: CPT

## 2022-04-01 PROCEDURE — C1894: CPT

## 2022-04-01 PROCEDURE — 87040 BLOOD CULTURE FOR BACTERIA: CPT

## 2022-04-01 PROCEDURE — 71045 X-RAY EXAM CHEST 1 VIEW: CPT

## 2022-04-01 PROCEDURE — 92610 EVALUATE SWALLOWING FUNCTION: CPT

## 2022-04-01 PROCEDURE — 85027 COMPLETE CBC AUTOMATED: CPT

## 2022-04-01 PROCEDURE — 82435 ASSAY OF BLOOD CHLORIDE: CPT

## 2022-04-01 PROCEDURE — 85025 COMPLETE CBC W/AUTO DIFF WBC: CPT

## 2022-04-01 PROCEDURE — 83036 HEMOGLOBIN GLYCOSYLATED A1C: CPT

## 2022-04-01 PROCEDURE — 85730 THROMBOPLASTIN TIME PARTIAL: CPT

## 2022-04-01 PROCEDURE — 99152 MOD SED SAME PHYS/QHP 5/>YRS: CPT

## 2022-04-01 PROCEDURE — 82436 ASSAY OF URINE CHLORIDE: CPT

## 2022-04-01 PROCEDURE — 87640 STAPH A DNA AMP PROBE: CPT

## 2022-04-01 PROCEDURE — C9399: CPT

## 2022-04-01 PROCEDURE — 76770 US EXAM ABDO BACK WALL COMP: CPT

## 2022-04-01 PROCEDURE — 84100 ASSAY OF PHOSPHORUS: CPT

## 2022-04-01 PROCEDURE — 80076 HEPATIC FUNCTION PANEL: CPT

## 2022-04-01 PROCEDURE — 87449 NOS EACH ORGANISM AG IA: CPT

## 2022-04-01 PROCEDURE — 86923 COMPATIBILITY TEST ELECTRIC: CPT

## 2022-04-01 PROCEDURE — 87324 CLOSTRIDIUM AG IA: CPT

## 2022-04-01 PROCEDURE — 83605 ASSAY OF LACTIC ACID: CPT

## 2022-04-01 PROCEDURE — 83935 ASSAY OF URINE OSMOLALITY: CPT

## 2022-04-01 PROCEDURE — U0003: CPT

## 2022-04-01 PROCEDURE — C1887: CPT

## 2022-04-01 PROCEDURE — 86850 RBC ANTIBODY SCREEN: CPT

## 2022-04-01 PROCEDURE — 82330 ASSAY OF CALCIUM: CPT

## 2022-04-01 PROCEDURE — 80053 COMPREHEN METABOLIC PANEL: CPT

## 2022-04-01 PROCEDURE — 82947 ASSAY GLUCOSE BLOOD QUANT: CPT

## 2022-04-01 PROCEDURE — 85018 HEMOGLOBIN: CPT

## 2022-04-01 PROCEDURE — C1769: CPT

## 2022-04-01 PROCEDURE — 84132 ASSAY OF SERUM POTASSIUM: CPT

## 2022-04-01 PROCEDURE — 85014 HEMATOCRIT: CPT

## 2022-04-01 PROCEDURE — 81001 URINALYSIS AUTO W/SCOPE: CPT

## 2022-04-01 PROCEDURE — 84133 ASSAY OF URINE POTASSIUM: CPT

## 2022-04-01 PROCEDURE — 99239 HOSP IP/OBS DSCHRG MGMT >30: CPT

## 2022-04-01 PROCEDURE — 86901 BLOOD TYPING SEROLOGIC RH(D): CPT

## 2022-04-01 PROCEDURE — 83880 ASSAY OF NATRIURETIC PEPTIDE: CPT

## 2022-04-01 PROCEDURE — 97530 THERAPEUTIC ACTIVITIES: CPT

## 2022-04-01 PROCEDURE — 82803 BLOOD GASES ANY COMBINATION: CPT

## 2022-04-01 PROCEDURE — 93454 CORONARY ARTERY ANGIO S&I: CPT

## 2022-04-01 PROCEDURE — 86803 HEPATITIS C AB TEST: CPT

## 2022-04-01 PROCEDURE — 94640 AIRWAY INHALATION TREATMENT: CPT

## 2022-04-01 PROCEDURE — P9016: CPT

## 2022-04-01 PROCEDURE — 84300 ASSAY OF URINE SODIUM: CPT

## 2022-04-01 PROCEDURE — 87641 MR-STAPH DNA AMP PROBE: CPT

## 2022-04-01 PROCEDURE — 83735 ASSAY OF MAGNESIUM: CPT

## 2022-04-01 RX ORDER — POTASSIUM CHLORIDE 20 MEQ
7 PACKET (EA) ORAL
Qty: 0 | Refills: 0 | DISCHARGE

## 2022-04-01 RX ORDER — ACETAMINOPHEN 500 MG
2 TABLET ORAL
Qty: 0 | Refills: 0 | DISCHARGE
Start: 2022-04-01

## 2022-04-01 RX ORDER — POTASSIUM CHLORIDE 20 MEQ
10 PACKET (EA) ORAL DAILY
Refills: 0 | Status: DISCONTINUED | OUTPATIENT
Start: 2022-04-02 | End: 2022-04-01

## 2022-04-01 RX ORDER — PANTOPRAZOLE SODIUM 20 MG/1
40 TABLET, DELAYED RELEASE ORAL
Qty: 0 | Refills: 0 | DISCHARGE
Start: 2022-04-01

## 2022-04-01 RX ORDER — CARVEDILOL PHOSPHATE 80 MG/1
1 CAPSULE, EXTENDED RELEASE ORAL
Qty: 0 | Refills: 0 | DISCHARGE
Start: 2022-04-01

## 2022-04-01 RX ORDER — SENNA PLUS 8.6 MG/1
2 TABLET ORAL
Qty: 0 | Refills: 0 | DISCHARGE
Start: 2022-04-01

## 2022-04-01 RX ORDER — POTASSIUM CHLORIDE 20 MEQ
40 PACKET (EA) ORAL ONCE
Refills: 0 | Status: COMPLETED | OUTPATIENT
Start: 2022-04-01 | End: 2022-04-01

## 2022-04-01 RX ORDER — POTASSIUM PHOSPHATE, MONOBASIC POTASSIUM PHOSPHATE, DIBASIC 236; 224 MG/ML; MG/ML
15 INJECTION, SOLUTION INTRAVENOUS ONCE
Refills: 0 | Status: DISCONTINUED | OUTPATIENT
Start: 2022-04-01 | End: 2022-04-01

## 2022-04-01 RX ORDER — SODIUM,POTASSIUM PHOSPHATES 278-250MG
1 POWDER IN PACKET (EA) ORAL
Refills: 0 | Status: COMPLETED | OUTPATIENT
Start: 2022-04-01 | End: 2022-04-01

## 2022-04-01 RX ORDER — MAGNESIUM SULFATE 500 MG/ML
1 VIAL (ML) INJECTION ONCE
Refills: 0 | Status: COMPLETED | OUTPATIENT
Start: 2022-04-01 | End: 2022-04-01

## 2022-04-01 RX ORDER — POLYETHYLENE GLYCOL 3350 17 G/17G
17 POWDER, FOR SOLUTION ORAL
Qty: 0 | Refills: 0 | DISCHARGE
Start: 2022-04-01

## 2022-04-01 RX ORDER — ATORVASTATIN CALCIUM 80 MG/1
1 TABLET, FILM COATED ORAL
Qty: 0 | Refills: 0 | DISCHARGE
Start: 2022-04-01

## 2022-04-01 RX ORDER — POTASSIUM CHLORIDE 20 MEQ
40 PACKET (EA) ORAL ONCE
Refills: 0 | Status: DISCONTINUED | OUTPATIENT
Start: 2022-04-01 | End: 2022-04-01

## 2022-04-01 RX ORDER — HYDROMORPHONE HYDROCHLORIDE 2 MG/ML
1 INJECTION INTRAMUSCULAR; INTRAVENOUS; SUBCUTANEOUS
Qty: 0 | Refills: 0 | DISCHARGE
Start: 2022-04-01

## 2022-04-01 RX ORDER — SODIUM BICARBONATE 1 MEQ/ML
2 SYRINGE (ML) INTRAVENOUS
Qty: 0 | Refills: 0 | DISCHARGE
Start: 2022-04-01

## 2022-04-01 RX ORDER — DOXAZOSIN MESYLATE 4 MG
1 TABLET ORAL
Qty: 0 | Refills: 0 | DISCHARGE
Start: 2022-04-01

## 2022-04-01 RX ORDER — BUDESONIDE AND FORMOTEROL FUMARATE DIHYDRATE 160; 4.5 UG/1; UG/1
2 AEROSOL RESPIRATORY (INHALATION)
Qty: 0 | Refills: 0 | DISCHARGE
Start: 2022-04-01

## 2022-04-01 RX ORDER — ASCORBIC ACID 60 MG
1 TABLET,CHEWABLE ORAL
Qty: 0 | Refills: 0 | DISCHARGE
Start: 2022-04-01

## 2022-04-01 RX ORDER — OXYCODONE HYDROCHLORIDE 5 MG/1
1 TABLET ORAL
Qty: 0 | Refills: 0 | DISCHARGE
Start: 2022-04-01

## 2022-04-01 RX ORDER — CLOPIDOGREL BISULFATE 75 MG/1
1 TABLET, FILM COATED ORAL
Qty: 0 | Refills: 0 | DISCHARGE
Start: 2022-04-01

## 2022-04-01 RX ADMIN — Medication 1300 MILLIGRAM(S): at 05:11

## 2022-04-01 RX ADMIN — Medication 1 TABLET(S): at 11:29

## 2022-04-01 RX ADMIN — Medication 100 GRAM(S): at 10:23

## 2022-04-01 RX ADMIN — Medication 1 PACKET(S): at 11:33

## 2022-04-01 RX ADMIN — Medication 500 MILLIGRAM(S): at 11:30

## 2022-04-01 RX ADMIN — CHLORHEXIDINE GLUCONATE 1 APPLICATION(S): 213 SOLUTION TOPICAL at 09:41

## 2022-04-01 RX ADMIN — Medication 40 MILLIEQUIVALENT(S): at 10:30

## 2022-04-01 RX ADMIN — CLOPIDOGREL BISULFATE 75 MILLIGRAM(S): 75 TABLET, FILM COATED ORAL at 11:29

## 2022-04-01 RX ADMIN — Medication 1300 MILLIGRAM(S): at 13:28

## 2022-04-01 RX ADMIN — OXYCODONE HYDROCHLORIDE 5 MILLIGRAM(S): 5 TABLET ORAL at 00:57

## 2022-04-01 RX ADMIN — Medication 650 MILLIGRAM(S): at 06:12

## 2022-04-01 RX ADMIN — OXYCODONE HYDROCHLORIDE 5 MILLIGRAM(S): 5 TABLET ORAL at 02:00

## 2022-04-01 RX ADMIN — Medication 650 MILLIGRAM(S): at 05:12

## 2022-04-01 RX ADMIN — CARVEDILOL PHOSPHATE 12.5 MILLIGRAM(S): 80 CAPSULE, EXTENDED RELEASE ORAL at 05:11

## 2022-04-01 RX ADMIN — Medication 1 PACKET(S): at 13:27

## 2022-04-01 RX ADMIN — Medication 650 MILLIGRAM(S): at 13:58

## 2022-04-01 RX ADMIN — BUDESONIDE AND FORMOTEROL FUMARATE DIHYDRATE 2 PUFF(S): 160; 4.5 AEROSOL RESPIRATORY (INHALATION) at 09:29

## 2022-04-01 RX ADMIN — PANTOPRAZOLE SODIUM 40 MILLIGRAM(S): 20 TABLET, DELAYED RELEASE ORAL at 05:12

## 2022-04-01 RX ADMIN — Medication 650 MILLIGRAM(S): at 13:28

## 2022-04-01 NOTE — PROGRESS NOTE ADULT - PROBLEM SELECTOR PLAN 4
Found to have LLE CFA and prox SFA occlusions.   Arterial angiogram was attempted by Vascular surgery at OSH however it was unsuccessful.  Continue Heparin drip and Plavix  Vascular eval appreciated- AKA this week
LALITA COSME scheduled for Monday 3/28  Continue Heparin drip and Plavix  Patient medically optimized for OR
Completed a course of abx at OSH. Currently monitoring off abx. Is afebrile, nontoxic appearing  - Course breath sounds on exam, will make duonebs standing  - c/w Mucinex
Found to have LLE CFA and prox SFA occlusions.   Arterial angiogram was attempted by Vascular surgery at OSH however it was unsuccessful.  Continue Heparin drip and Plavix  [facility] today for risk stratification for possible BKA
LALITA COSME pending- tentatively scheduled for Monday 3/28, NPO MN, hold heparin gtt at midnight  Continue Heparin drip and Plavix  Patient medically optimized for OR
Found to have LLE CFA and prox SFA occlusions.   Arterial angiogram was attempted by Vascular surgery at OSH however it was unsuccessful.  Continue Heparin drip and Plavix  Vascular eval appreciated- AKA this week
LALITA COSME pending- tentatively scheduled for Monday 3/28  Continue Heparin drip and Plavix  Patient medically optimized for OR
Found to have LLE CFA and prox SFA occlusions.   Arterial angiogram was attempted by Vascular surgery at OSH however it was unsuccessful.  Continue Heparin drip and Plavix  Vascular eval appreciated- BKA next week
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 Dressing change due 3/31
serum k 3.5 today. Monitor while on D5W. Can given one dose of 20 meq KCl once. Monitor serum K.
Found to have LLE CFA and prox SFA occlusions.   Arterial angiogram was attempted by Vascular surgery at OSH however it was unsuccessful.  Continue Heparin drip and Plavix  [facility] today for risk stratification for possible BKA
Found to have LLE CFA and prox SFA occlusions.   Arterial angiogram was attempted by Vascular surgery at OSH however it was unsuccessful.  Continue Heparin drip and Plavix  [facility] pending for risk stratification for possible BKA
Completed a course of abx at OSH. Currently monitoring off abx. Is afebrile, nontoxic appearing  - Course breath sounds on exam, will make venessa standing  - Start Mucinex
LALITA COSME pending- per conversation with vascular on 3/23 scheduled for Monday 3/28, however note today indicates OR date still pending  Continue Heparin drip and Plavix  Patient medically optimized for OR
Found to have LLE CFA and prox SFA occlusions.   Arterial angiogram was attempted by Vascular surgery at OSH however it was unsuccessful.  Continue Heparin drip and Plavix  Will require BKA
s/p L AKA 3/28  s/p dressing change with vascular today  d/w Vascular surgery, patient will require daily dressing change at rehab but no further inpatient needs  pain control
L AKA today. heparin gtt was held at midnight   Continue Heparin drip and Plavix  Patient medically optimized for OR
s/p L AKA 3/28  s/p dressing change with vascular today  d/w Vascular surgery 3/31, patient will require daily dressing change at rehab but no further inpatient needs  pain control
Found to have LLE CFA and prox SFA occlusions.   Arterial angiogram was attempted by Vascular surgery at OSH however it was unsuccessful.  Continue Heparin drip and Plavix  Will require BKA
Found to have LLE CFA and prox SFA occlusions.   Arterial angiogram was attempted by Vascular surgery at OSH however it was unsuccessful.  Continue Heparin drip and Plavix  Vascular eval appreciated- AKA this week  Patient medically optimized for OR
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 Dressing change due 3/31
LALITA COSME pending- tentatively scheduled for Monday 3/28  Continue Heparin drip and Plavix  Patient medically optimized for OR

## 2022-04-01 NOTE — PROGRESS NOTE ADULT - PROBLEM SELECTOR PROBLEM 3
Acute kidney injury superimposed on CKD
Metabolic acidosis
Acute kidney injury superimposed on CKD
Metabolic acidosis
Acute kidney injury superimposed on CKD
Chronic heart failure
Chronic heart failure
Acute kidney injury superimposed on CKD

## 2022-04-01 NOTE — PROGRESS NOTE ADULT - PROBLEM SELECTOR PROBLEM 1
Acute kidney injury superimposed on CKD
Acute kidney injury superimposed on CKD
Positive cardiac stress test
Acute kidney injury superimposed on CKD
Positive cardiac stress test
Acute kidney injury superimposed on CKD
Positive cardiac stress test
Acute kidney injury superimposed on CKD
Positive cardiac stress test
Peripheral arterial occlusive disease
Positive cardiac stress test
Peripheral arterial occlusive disease
Positive cardiac stress test

## 2022-04-01 NOTE — PROGRESS NOTE ADULT - PROBLEM SELECTOR PLAN 5
EF 35%   Continue Coreg 12.5 mg BID  Off ACE-I/ARB 2/2 BAUDILIO on CKD  CXR- decreased congestive changes
EF 35%   Continue Coreg 12.5 mg BID  Off ACE-I/ARB 2/2 BAUDILIO on CKD  CXR- decreased congestive changes
EF 35%   Continue Coreg 12.5mg BID  Off ACE-I/ARB 2/2 BAUDILIO on CKD
EF 35%   Continue Coreg 12.5 mg BID  Off ACE-I/ARB 2/2 BAUDILIO on CKD  CXR- decreased congestive changes
EF 35%   Continue Coreg 12.5mg BID  Off ACE-I/ARB 2/2 BAUDILIO on CKD
EF 35%   Continue Coreg 12.5mg BID  Off ACE-I/ARB 2/2 BAUDILIO on CKD
BP above goal.   - Will monitor BP, may require increase in Coreg to 25mg BID
EF 35%   Continue Coreg 12.5mg BID  Off ACE-I/ARB 2/2 BAUDILIO on CKD
EF 35%   Continue Coreg 12.5 mg BID  Off ACE-I/ARB 2/2 BAUDILIO on CKD  CXR- decreased congestive changes
EF 35%   Continue Coreg 12.5 mg BID  Off ACE-I/ARB 2/2 BAUDILIO on CKD  CXR- decreased congestive changes
EF 35%   Continue Coreg 12.5mg BID  Off ACE-I/ARB 2/2 BAUDILIO on CKD
EF 35%   Continue Coreg 12.5mg BID  Off ACE-I/ARB 2/2 BAUDILIO on CKD
EF 35%   Continue Coreg 12.5 mg BID  Off ACE-I/ARB 2/2 BAUDILIO on CKD  CXR- decreased congestive changes
BP above goal, improving  - Will monitor BP, may require increase in Coreg to 25mg BID
EF 35%   Continue Coreg 12.5mg BID  Off ACE-I/ARB 2/2 BAUDILIO on CKD
EF 35%   Continue Coreg 12.5 mg BID  Off ACE-I/ARB 2/2 BAUDILIO on CKD  CXR- decreased congestive changes

## 2022-04-01 NOTE — PROGRESS NOTE ADULT - NUTRITIONAL ASSESSMENT
This patient has been assessed with a concern for Malnutrition and has been determined to have a diagnosis/diagnoses of Moderate protein-calorie malnutrition.    This patient is being managed with:   Diet Consistent Carbohydrate w/Evening Snack-  DASH/TLC {Sodium & Cholesterol Restricted} (DASH)  Pureed (PUREED)  Mahin(7 Gm Arginine/7 Gm Glut/1.2 Gm HMB     Qty per Day:  2  Supplement Feeding Modality:  Oral  Entered: Mar 21 2022 11:19AM    
This patient has been assessed with a concern for Malnutrition and has been determined to have a diagnosis/diagnoses of Moderate protein-calorie malnutrition.    This patient is being managed with:   Diet Consistent Carbohydrate w/Evening Snack-  DASH/TLC {Sodium & Cholesterol Restricted} (DASH)  Pureed (PUREED)  Mahin(7 Gm Arginine/7 Gm Glut/1.2 Gm HMB     Qty per Day:  2  Supplement Feeding Modality:  Oral  Entered: Mar 28 2022  7:19PM    
This patient has been assessed with a concern for Malnutrition and has been determined to have a diagnosis/diagnoses of Moderate protein-calorie malnutrition.    This patient is being managed with:   Diet Consistent Carbohydrate w/Evening Snack-  DASH/TLC {Sodium & Cholesterol Restricted} (DASH)  Pureed (PUREED)  Mahin(7 Gm Arginine/7 Gm Glut/1.2 Gm HMB     Qty per Day:  2  Supplement Feeding Modality:  Oral  Entered: Mar 21 2022 11:19AM    
This patient has been assessed with a concern for Malnutrition and has been determined to have a diagnosis/diagnoses of Moderate protein-calorie malnutrition.    This patient is being managed with:   Diet Consistent Carbohydrate w/Evening Snack-  DASH/TLC {Sodium & Cholesterol Restricted} (DASH)  Pureed (PUREED)  Mahin(7 Gm Arginine/7 Gm Glut/1.2 Gm HMB     Qty per Day:  2  Supplement Feeding Modality:  Oral  Entered: Mar 28 2022  7:19PM    
This patient has been assessed with a concern for Malnutrition and has been determined to have a diagnosis/diagnoses of Moderate protein-calorie malnutrition.    This patient is being managed with:   Diet Consistent Carbohydrate w/Evening Snack-  DASH/TLC {Sodium & Cholesterol Restricted} (DASH)  Pureed (PUREED)  Mahin(7 Gm Arginine/7 Gm Glut/1.2 Gm HMB     Qty per Day:  2  Supplement Feeding Modality:  Oral  Entered: Mar 21 2022 11:19AM    
This patient has been assessed with a concern for Malnutrition and has been determined to have a diagnosis/diagnoses of Moderate protein-calorie malnutrition.    This patient is being managed with:   Diet NPO after Midnight-     NPO Start Date: 27-Mar-2022   NPO Start Time: 23:59  Entered: Mar 27 2022  8:44AM    Diet Consistent Carbohydrate w/Evening Snack-  DASH/TLC {Sodium & Cholesterol Restricted} (DASH)  Pureed (PUREED)  Mahin(7 Gm Arginine/7 Gm Glut/1.2 Gm HMB     Qty per Day:  2  Supplement Feeding Modality:  Oral  Entered: Mar 21 2022 11:19AM    
This patient has been assessed with a concern for Malnutrition and has been determined to have a diagnosis/diagnoses of Moderate protein-calorie malnutrition.    This patient is being managed with:   Diet Consistent Carbohydrate w/Evening Snack-  DASH/TLC {Sodium & Cholesterol Restricted} (DASH)  Pureed (PUREED)  Mahin(7 Gm Arginine/7 Gm Glut/1.2 Gm HMB     Qty per Day:  2  Supplement Feeding Modality:  Oral  Entered: Mar 28 2022  7:19PM    
This patient has been assessed with a concern for Malnutrition and has been determined to have a diagnosis/diagnoses of Moderate protein-calorie malnutrition.    This patient is being managed with:   Diet NPO after Midnight-     NPO Start Date: 27-Mar-2022   NPO Start Time: 23:59  Entered: Mar 27 2022  8:44AM    Diet Consistent Carbohydrate w/Evening Snack-  DASH/TLC {Sodium & Cholesterol Restricted} (DASH)  Pureed (PUREED)  Mahin(7 Gm Arginine/7 Gm Glut/1.2 Gm HMB     Qty per Day:  2  Supplement Feeding Modality:  Oral  Entered: Mar 21 2022 11:19AM

## 2022-04-01 NOTE — PROGRESS NOTE ADULT - SUBJECTIVE AND OBJECTIVE BOX
[facility] Division of Hospital Medicine  Jessica Vigil DO pager [phone]    Patient is a 76y old  Male who presents with a chief complaint of Cardiac clearnace for ischemic limb (31 Mar 2022 13:57)      SUBJECTIVE / OVERNIGHT EVENTS:  No acute overnight events.  Pain controlled this AM.       MEDICATIONS  (STANDING):  albuterol/ipratropium for Nebulization 3 milliLiter(s) Nebulizer every 6 hours  ascorbic acid 500 milliGRAM(s) Oral daily  atorvastatin 40 milliGRAM(s) Oral at bedtime  budesonide 160 MICROgram(s)/formoterol 4.5 MICROgram(s) Inhaler 2 Puff(s) Inhalation two times a day  carvedilol 12.5 milliGRAM(s) Oral every 12 hours  chlorhexidine 2% Cloths 1 Application(s) Topical <User Schedule>  chlorhexidine 4% Liquid 1 Application(s) Topical <User Schedule>  clopidogrel Tablet 75 milliGRAM(s) Oral daily  dextrose 5%. 1000 milliLiter(s) (75 mL/Hr) IV Continuous <Continuous>  doxazosin 4 milliGRAM(s) Oral at bedtime  insulin lispro (ADMELOG) corrective regimen sliding scale   SubCutaneous three times a day before meals  Nephro-carlton 1 Tablet(s) Oral daily  pantoprazole   Suspension 40 milliGRAM(s) Oral before breakfast  senna 2 Tablet(s) Oral at bedtime  sodium bicarbonate 1300 milliGRAM(s) Oral three times a day    MEDICATIONS  (PRN):  acetaminophen     Tablet .. 650 milliGRAM(s) Oral every 6 hours PRN Moderate Pain (4 - 6)  HYDROmorphone   Tablet 2 milliGRAM(s) Oral three times a day PRN Severe Pain (7 - 10)  oxyCODONE    IR 5 milliGRAM(s) Oral every 4 hours PRN Moderate Pain (4 - 6)  polyethylene glycol 3350 17 Gram(s) Oral daily PRN Constipation      CAPILLARY BLOOD GLUCOSE      POCT Blood Glucose.: 123 mg/dL (01 Apr 2022 11:37)  POCT Blood Glucose.: 98 mg/dL (01 Apr 2022 08:05)  POCT Blood Glucose.: 137 mg/dL (31 Mar 2022 21:31)  POCT Blood Glucose.: 113 mg/dL (31 Mar 2022 17:06)    I&O's Summary    31 Mar 2022 07:01  -  01 Apr 2022 07:00  --------------------------------------------------------  IN: 480 mL / OUT: 1 mL / NET: 479 mL    01 Apr 2022 07:01  -  01 Apr 2022 13:56  --------------------------------------------------------  IN: 297 mL / OUT: 0 mL / NET: 297 mL        PHYSICAL EXAM:  Vital Signs Last 24 Hrs  T(C): 36.8 (01 Apr 2022 12:07), Max: 37.1 (31 Mar 2022 21:05)  T(F): 98.2 (01 Apr 2022 12:07), Max: 98.7 (31 Mar 2022 21:05)  HR: 75 (01 Apr 2022 12:07) (73 - 81)  BP: 149/69 (01 Apr 2022 12:07) (144/70 - 152/76)  BP(mean): --  RR: 18 (01 Apr 2022 12:07) (16 - 18)  SpO2: 95% (01 Apr 2022 12:07) (95% - 99%)    CONSTITUTIONAL: NAD, well-developed  RESPIRATORY: Normal respiratory effort; lungs are clear to auscultation bilaterally  CARDIOVASCULAR: Regular rate and rhythm, normal S1 and S2  ABDOMEN: Nontender to palpation, normoactive bowel sounds, no rebound/guarding  MUSCULOSKELETAL: no clubbing or cyanosis of digits; no joint swelling or tenderness to palpation, R AKA, L AKA - dressing and ACE wrap c/d/i  PSYCH: A+O to person, place, and time; affect appropriate    LABS:                        9.1    7.29  )-----------( 183      ( 01 Apr 2022 06:22 )             27.6     04-01    140  |  109<H>  |  11  ----------------------------<  98  3.3<L>   |  21<L>  |  1.04    Ca    8.1<L>      01 Apr 2022 06:20  Phos  2.4     04-01  Mg     1.6     04-01                  RADIOLOGY & ADDITIONAL TESTS:  Results Reviewed:   Imaging Personally Reviewed:  Electrocardiogram Personally Reviewed:    COORDINATION OF CARE:  Care Discussed with Consultants/Other Providers [Y/N]:  Prior or Outpatient Records Reviewed [Y/N]:

## 2022-04-01 NOTE — PROGRESS NOTE ADULT - PROBLEM SELECTOR PLAN 1
[facility] completed, LM 10%, LAD 60%, Cx and RCA  with collaterals   Per cardiology high risk patient undergoing intermediate risk surgery/procedure OPTIMIZED to the lowest risk predicted by the RCRI based on co-morbidities and nature of operation/procedure.   Continue medical management with Coreg, Lipitor, Plavix

## 2022-04-01 NOTE — PROGRESS NOTE ADULT - PROBLEM SELECTOR PLAN 2
Mild leukocytosis post-procedure likely stress induced, now resolved   Afebrile  hemoglobin stable post procedure

## 2022-04-01 NOTE — DISCHARGE NOTE NURSING/CASE MANAGEMENT/SOCIAL WORK - NSDCPEFALRISK_GEN_ALL_CORE
For information on Fall & Injury Prevention, visit: [email or web address] OR  [email or web address] OR  https://www.cdc.gov/steadi/patient.html

## 2022-04-01 NOTE — PROGRESS NOTE ADULT - PROBLEM SELECTOR PROBLEM 4
Peripheral arterial occlusive disease
Pneumonia
Hypokalemia
Peripheral arterial occlusive disease
Pneumonia
Peripheral arterial occlusive disease

## 2022-04-01 NOTE — PROGRESS NOTE ADULT - PROBLEM SELECTOR PROBLEM 2
Metabolic acidosis
Hypernatremia
Leukocytosis
Leukocytosis
Metabolic acidosis
Leukocytosis
Hypernatremia
Leukocytosis
Metabolic acidosis
Leukocytosis
Positive cardiac stress test
Leukocytosis
Positive cardiac stress test
Leukocytosis

## 2022-04-01 NOTE — PROGRESS NOTE ADULT - PROVIDER SPECIALTY LIST ADULT
Infectious Disease
Vascular Surgery
Wound Care
Cardiology
Infectious Disease
Internal Medicine
Internal Medicine
SHOSHANA
Vascular Surgery
Cardiology
Infectious Disease
Vascular Surgery
Cardiology
Nephrology
SHOSHANA
Internal Medicine
Internal Medicine
Nephrology
Hospitalist
Hospitalist
Nephrology
Internal Medicine
Nephrology
Internal Medicine
Hospitalist
Internal Medicine
Nephrology
Hospitalist
Internal Medicine
Hospitalist
Hospitalist
Internal Medicine
Internal Medicine
Hospitalist
Internal Medicine

## 2022-04-01 NOTE — PROGRESS NOTE ADULT - PROBLEM SELECTOR PROBLEM 5
Chronic heart failure
HTN (hypertension)
Chronic heart failure
HTN (hypertension)
Chronic heart failure

## 2022-04-01 NOTE — DISCHARGE NOTE NURSING/CASE MANAGEMENT/SOCIAL WORK - PATIENT PORTAL LINK FT
You can access the FollowMyHealth Patient Portal offered by [facility] by registering at the following website: [email or web address]. By joining [facility]’s FollowMyHealth portal, you will also be able to view your health information using other applications (apps) compatible with our system.

## 2022-04-01 NOTE — PROGRESS NOTE ADULT - ASSESSMENT
76M w/ pmh of HTN, HLD, PVD s/p R CARMELINA who was brought by EMS to [facility] after a mechanical fall from his wheelchair found to have CHF exacerbation, PNA as well as acute LLE arterial occlusions w/ unsuccessful angiogram. Stress test at OSH was positive, transferred for angiogram for cardiac clearance for LLE AKA.

## 2022-04-21 LAB — SURGICAL PATHOLOGY STUDY: SIGNIFICANT CHANGE UP

## 2022-05-11 NOTE — PHYSICAL THERAPY INITIAL EVALUATION ADULT - IMPAIRED TRANSFERS: SIT/STAND, REHAB EVAL
2nd follow up discharge call attempted.  Chart reviewed. Left message encouraging to follow instructions in the discharge paperwork with regards to medications and follow up appointments, and to call doctor if there are any questions or concerns. CLEMENTE Worley RN   impaired balance/pain/decreased strength

## 2022-12-02 NOTE — PROGRESS NOTE ADULT - PROBLEM SELECTOR PLAN 3
Baseline unknown but continues to improve  Renal sono with decreased echogenicty c/w parenchymal disease  Hypernatremia- resolved- encourage free water intake Opzelura Counseling:  I discussed with the patient the risks of Opzelura including but not limited to nasopharngitis, bronchitis, ear infection, eosinophila, hives, diarrhea, folliculitis, tonsillitis, and rhinorrhea.  Taken orally, this medication has been linked to serious infections; higher rate of mortality; malignancy and lymphoproliferative disorders; major adverse cardiovascular events; thrombosis; thrombocytopenia, anemia, and neutropenia; and lipid elevations.

## 2023-03-20 NOTE — PRE-ANESTHESIA EVALUATION ADULT - NSANTHSUBSTSD_GEN_ALL_CORE
No PAST MEDICAL HISTORY:  Anxiety     Chronic back pain     Diabetes     DM (diabetes mellitus)     Head trauma     HIV infection     Hypertension     Insomnia       Bilateral cataracts

## 2023-07-24 NOTE — PROGRESS NOTE ADULT - PROBLEM SELECTOR PROBLEM 6
Discharge planning issues
Hyperlipidemia
Discharge planning issues
Hyperlipidemia
Discharge planning issues
Hyperlipidemia
Discharge planning issues
Hyperlipidemia
Discharge planning issues
Discharge planning issues
Hyperlipidemia
Discharge planning issues
Bruce Zimmer

## (undated) DEVICE — SUCTION YANKAUER NO CONTROL VENT

## (undated) DEVICE — DRSG CURITY GAUZE SPONGE 4 X 4" 12-PLY

## (undated) DEVICE — DISSECTOR ENDO PEANUT 5MM

## (undated) DEVICE — POSITIONER CARDIAC BUMP

## (undated) DEVICE — SUT POLYSORB 3-0 18" TIES UNDYED

## (undated) DEVICE — VISITEC 4X4

## (undated) DEVICE — DRAPE 1/2 SHEET 40X57"

## (undated) DEVICE — FOLEY HOLDER STATLOCK 2 WAY ADULT

## (undated) DEVICE — WARMING BLANKET UPPER ADULT

## (undated) DEVICE — SUT SOFSILK 2-0 30" V-20

## (undated) DEVICE — DRAPE MAGNETIC INSTRUMENT MEDIUM

## (undated) DEVICE — VENODYNE/SCD SLEEVE CALF LARGE

## (undated) DEVICE — SAW BLADE MICROAIRE OSCILATING 25.4MM X 90MM X 1.27MM

## (undated) DEVICE — PREP CHLORAPREP HI-LITE ORANGE 26ML

## (undated) DEVICE — SUT MONOSOF 3-0 30" SC-2

## (undated) DEVICE — SUT POLYSORB 4-0 18" TIES UNDYED

## (undated) DEVICE — SUT POLYSORB 2-0 36" KV-34 UNDYED

## (undated) DEVICE — DRSG XEROFORM 1 X 8"

## (undated) DEVICE — DRSG KLING 6"

## (undated) DEVICE — NDL COUNTER FOAM AND MAGNET 40-70

## (undated) DEVICE — ELCTR BOVIE PENCIL HANDPIECE

## (undated) DEVICE — DRSG STERISTRIPS 0.5 X 4"

## (undated) DEVICE — DRSG STOCKINETTE IMPERVIOUS XL

## (undated) DEVICE — SUT POLYSORB 2-0 18" TIES UNDYED

## (undated) DEVICE — MEDICATION LABELS W MARKER

## (undated) DEVICE — GOWN XL

## (undated) DEVICE — SUT POLYSORB 2-0 30" GS-21 UNDYED

## (undated) DEVICE — SPECIMEN CONTAINER 100ML

## (undated) DEVICE — SOL IRR POUR H2O 250ML

## (undated) DEVICE — DRAPE LIGHT HANDLE COVER (BLUE)

## (undated) DEVICE — DRAPE MAYO STAND 30"

## (undated) DEVICE — FOLEY TRAY 16FR 5CC LTX UMETER CLOSED

## (undated) DEVICE — SOL IRR POUR NS 0.9% 500ML

## (undated) DEVICE — ELCTR HEX BLADE

## (undated) DEVICE — STAPLER SKIN VISI-STAT 35 WIDE

## (undated) DEVICE — BLADE SCALPEL SAFETYLOCK #15

## (undated) DEVICE — DRSG OPSITE 13.75 X 4"

## (undated) DEVICE — SUT SOFSILK 3-0 30" V-20

## (undated) DEVICE — DRSG COMBINE 5X9"

## (undated) DEVICE — GLV 7.5 PROTEXIS (WHITE)

## (undated) DEVICE — DRSG TEGADERM 6"X8"

## (undated) DEVICE — SYR ASEPTO

## (undated) DEVICE — DRAPE 3/4 SHEET W REINFORCEMENT 56X77"

## (undated) DEVICE — SUT POLYSORB 3-0 30" V-20 UNDYED

## (undated) DEVICE — TUBING SUCTION 20FT

## (undated) DEVICE — STRYKER INTERPULSE HANDPIECE W IRR SUCTION TUBE

## (undated) DEVICE — BLADE SCALPEL SAFETYLOCK #10

## (undated) DEVICE — DRAPE ISOLATION BAG 20X20"

## (undated) DEVICE — GLV 8 PROTEXIS (WHITE)

## (undated) DEVICE — GLV 6.5 PROTEXIS (WHITE)

## (undated) DEVICE — DRAPE TOWEL BLUE 17" X 24"

## (undated) DEVICE — DRAIN CHANNEL 19FR ROUND FULL FLUTED

## (undated) DEVICE — POSITIONER FOAM EGG CRATE ULNAR 2PCS (PINK)

## (undated) DEVICE — GLV 8.5 PROTEXIS (WHITE)

## (undated) DEVICE — SUT POLYSORB 0 18" TIES UNDYED

## (undated) DEVICE — SUT MONOSOF 2-0 18" C-15

## (undated) DEVICE — GOWN TRIMAX LG

## (undated) DEVICE — DRSG XEROFORM 2 X 2"

## (undated) DEVICE — GLV 7 PROTEXIS (WHITE)

## (undated) DEVICE — DRSG TELFA 3 X 8

## (undated) DEVICE — MARKING PEN W RULER

## (undated) DEVICE — BLADE SCALPEL SAFETYLOCK #11

## (undated) DEVICE — PACK EXTREMITY

## (undated) DEVICE — LAP PAD 18 X 18"

## (undated) DEVICE — SUT SURGIPRO 2-0 18" C-15

## (undated) DEVICE — DRSG ACE BANDAGE 6"

## (undated) DEVICE — DRAPE INSTRUMENT POUCH 6.75" X 11"